# Patient Record
Sex: MALE | Race: BLACK OR AFRICAN AMERICAN | Employment: OTHER | URBAN - METROPOLITAN AREA
[De-identification: names, ages, dates, MRNs, and addresses within clinical notes are randomized per-mention and may not be internally consistent; named-entity substitution may affect disease eponyms.]

---

## 2017-04-07 ENCOUNTER — HOSPITAL ENCOUNTER (INPATIENT)
Age: 82
LOS: 4 days | Discharge: NURSING, REHAB OR LONG-TERM CARE | DRG: 293 | End: 2017-04-11
Attending: EMERGENCY MEDICINE | Admitting: FAMILY MEDICINE
Payer: MEDICARE

## 2017-04-07 ENCOUNTER — APPOINTMENT (OUTPATIENT)
Dept: GENERAL RADIOLOGY | Age: 82
DRG: 293 | End: 2017-04-07
Attending: EMERGENCY MEDICINE
Payer: MEDICARE

## 2017-04-07 DIAGNOSIS — I50.9 CONGESTIVE HEART FAILURE, UNSPECIFIED CONGESTIVE HEART FAILURE CHRONICITY, UNSPECIFIED CONGESTIVE HEART FAILURE TYPE: Primary | ICD-10-CM

## 2017-04-07 DIAGNOSIS — R60.9 PERIPHERAL EDEMA: ICD-10-CM

## 2017-04-07 DIAGNOSIS — R77.8 ELEVATED TROPONIN I LEVEL: ICD-10-CM

## 2017-04-07 LAB
ANION GAP BLD CALC-SCNC: 8 MMOL/L (ref 5–15)
BASOPHILS # BLD AUTO: 0 K/UL (ref 0–0.1)
BASOPHILS # BLD: 1 % (ref 0–1)
BNP SERPL-MCNC: 2968 PG/ML (ref 0–450)
BUN SERPL-MCNC: 25 MG/DL (ref 6–20)
BUN/CREAT SERPL: 20 (ref 12–20)
CALCIUM SERPL-MCNC: 8.7 MG/DL (ref 8.5–10.1)
CHLORIDE SERPL-SCNC: 107 MMOL/L (ref 97–108)
CK SERPL-CCNC: 296 U/L (ref 39–308)
CO2 SERPL-SCNC: 29 MMOL/L (ref 21–32)
CREAT SERPL-MCNC: 1.25 MG/DL (ref 0.7–1.3)
EOSINOPHIL # BLD: 0.1 K/UL (ref 0–0.4)
EOSINOPHIL NFR BLD: 3 % (ref 0–7)
ERYTHROCYTE [DISTWIDTH] IN BLOOD BY AUTOMATED COUNT: 14.6 % (ref 11.5–14.5)
GLUCOSE SERPL-MCNC: 89 MG/DL (ref 65–100)
HCT VFR BLD AUTO: 36.8 % (ref 36.6–50.3)
HGB BLD-MCNC: 11.4 G/DL (ref 12.1–17)
LYMPHOCYTES # BLD AUTO: 39 % (ref 12–49)
LYMPHOCYTES # BLD: 1.3 K/UL (ref 0.8–3.5)
MAGNESIUM SERPL-MCNC: 1.9 MG/DL (ref 1.6–2.4)
MCH RBC QN AUTO: 29.2 PG (ref 26–34)
MCHC RBC AUTO-ENTMCNC: 31 G/DL (ref 30–36.5)
MCV RBC AUTO: 94.1 FL (ref 80–99)
MONOCYTES # BLD: 0.4 K/UL (ref 0–1)
MONOCYTES NFR BLD AUTO: 12 % (ref 5–13)
NEUTS SEG # BLD: 1.5 K/UL (ref 1.8–8)
NEUTS SEG NFR BLD AUTO: 45 % (ref 32–75)
PLATELET # BLD AUTO: 171 K/UL (ref 150–400)
POTASSIUM SERPL-SCNC: 4.1 MMOL/L (ref 3.5–5.1)
RBC # BLD AUTO: 3.91 M/UL (ref 4.1–5.7)
SODIUM SERPL-SCNC: 144 MMOL/L (ref 136–145)
TROPONIN I SERPL-MCNC: 0.12 NG/ML
WBC # BLD AUTO: 3.3 K/UL (ref 4.1–11.1)

## 2017-04-07 PROCEDURE — 36415 COLL VENOUS BLD VENIPUNCTURE: CPT | Performed by: EMERGENCY MEDICINE

## 2017-04-07 PROCEDURE — 83880 ASSAY OF NATRIURETIC PEPTIDE: CPT | Performed by: EMERGENCY MEDICINE

## 2017-04-07 PROCEDURE — 65660000000 HC RM CCU STEPDOWN

## 2017-04-07 PROCEDURE — 74011250637 HC RX REV CODE- 250/637: Performed by: EMERGENCY MEDICINE

## 2017-04-07 PROCEDURE — 96374 THER/PROPH/DIAG INJ IV PUSH: CPT

## 2017-04-07 PROCEDURE — 80048 BASIC METABOLIC PNL TOTAL CA: CPT | Performed by: EMERGENCY MEDICINE

## 2017-04-07 PROCEDURE — 71020 XR CHEST PA LAT: CPT

## 2017-04-07 PROCEDURE — 82550 ASSAY OF CK (CPK): CPT | Performed by: EMERGENCY MEDICINE

## 2017-04-07 PROCEDURE — 94762 N-INVAS EAR/PLS OXIMTRY CONT: CPT

## 2017-04-07 PROCEDURE — 84484 ASSAY OF TROPONIN QUANT: CPT | Performed by: EMERGENCY MEDICINE

## 2017-04-07 PROCEDURE — 85025 COMPLETE CBC W/AUTO DIFF WBC: CPT | Performed by: EMERGENCY MEDICINE

## 2017-04-07 PROCEDURE — 99285 EMERGENCY DEPT VISIT HI MDM: CPT

## 2017-04-07 PROCEDURE — 93005 ELECTROCARDIOGRAM TRACING: CPT

## 2017-04-07 PROCEDURE — 74011250636 HC RX REV CODE- 250/636: Performed by: EMERGENCY MEDICINE

## 2017-04-07 PROCEDURE — 83735 ASSAY OF MAGNESIUM: CPT | Performed by: EMERGENCY MEDICINE

## 2017-04-07 RX ORDER — FUROSEMIDE 10 MG/ML
40 INJECTION INTRAMUSCULAR; INTRAVENOUS
Status: COMPLETED | OUTPATIENT
Start: 2017-04-07 | End: 2017-04-07

## 2017-04-07 RX ORDER — THERA TABS 400 MCG
1 TAB ORAL DAILY
COMMUNITY

## 2017-04-07 RX ORDER — FUROSEMIDE 40 MG/1
40 TABLET ORAL 2 TIMES DAILY
Status: DISCONTINUED | OUTPATIENT
Start: 2017-04-08 | End: 2017-04-11

## 2017-04-07 RX ORDER — SERTRALINE HYDROCHLORIDE 50 MG/1
50 TABLET, FILM COATED ORAL
Status: DISCONTINUED | OUTPATIENT
Start: 2017-04-08 | End: 2017-04-11 | Stop reason: HOSPADM

## 2017-04-07 RX ORDER — CARBIDOPA AND LEVODOPA 25; 250 MG/1; MG/1
1 TABLET ORAL 3 TIMES DAILY
Status: DISCONTINUED | OUTPATIENT
Start: 2017-04-08 | End: 2017-04-11 | Stop reason: HOSPADM

## 2017-04-07 RX ORDER — HYDRALAZINE HYDROCHLORIDE 25 MG/1
25 TABLET, FILM COATED ORAL 3 TIMES DAILY
Status: DISCONTINUED | OUTPATIENT
Start: 2017-04-08 | End: 2017-04-08

## 2017-04-07 RX ORDER — SODIUM CHLORIDE 0.9 % (FLUSH) 0.9 %
5-10 SYRINGE (ML) INJECTION EVERY 8 HOURS
Status: DISCONTINUED | OUTPATIENT
Start: 2017-04-07 | End: 2017-04-11 | Stop reason: HOSPADM

## 2017-04-07 RX ORDER — POTASSIUM CHLORIDE 750 MG/1
10 TABLET, FILM COATED, EXTENDED RELEASE ORAL 2 TIMES DAILY
COMMUNITY
End: 2017-05-02 | Stop reason: SDUPTHER

## 2017-04-07 RX ORDER — SIMVASTATIN 20 MG/1
40 TABLET, FILM COATED ORAL
Status: DISCONTINUED | OUTPATIENT
Start: 2017-04-08 | End: 2017-04-11 | Stop reason: HOSPADM

## 2017-04-07 RX ORDER — FUROSEMIDE 40 MG/1
40 TABLET ORAL DAILY
Status: DISCONTINUED | OUTPATIENT
Start: 2017-04-08 | End: 2017-04-07

## 2017-04-07 RX ORDER — SERTRALINE HYDROCHLORIDE 50 MG/1
50 TABLET, FILM COATED ORAL
COMMUNITY
End: 2017-06-11 | Stop reason: SDUPTHER

## 2017-04-07 RX ORDER — SODIUM CHLORIDE 0.9 % (FLUSH) 0.9 %
5-10 SYRINGE (ML) INJECTION AS NEEDED
Status: DISCONTINUED | OUTPATIENT
Start: 2017-04-07 | End: 2017-04-11 | Stop reason: HOSPADM

## 2017-04-07 RX ORDER — GUAIFENESIN 100 MG/5ML
162 LIQUID (ML) ORAL
Status: COMPLETED | OUTPATIENT
Start: 2017-04-07 | End: 2017-04-07

## 2017-04-07 RX ADMIN — ASPIRIN 81 MG CHEWABLE TABLET 162 MG: 81 TABLET CHEWABLE at 20:47

## 2017-04-07 RX ADMIN — FUROSEMIDE 40 MG: 10 INJECTION, SOLUTION INTRAMUSCULAR; INTRAVENOUS at 20:47

## 2017-04-07 NOTE — IP AVS SNAPSHOT
Current Discharge Medication List  
  
START taking these medications Dose & Instructions Dispensing Information Comments Morning Noon Evening Bedtime  
 losartan 25 mg tablet Commonly known as:  COZAAR Your last dose was: Your next dose is:    
   
   
 Dose:  25 mg Take 1 Tab by mouth daily (with dinner). Quantity:  30 Tab Refills:  0 CONTINUE these medications which have NOT CHANGED Dose & Instructions Dispensing Information Comments Morning Noon Evening Bedtime  
 apixaban 2.5 mg tablet Commonly known as:  Aranza Dage Your last dose was: Your next dose is: TAKE ONE TABLET BY MOUTH TWICE DAILY Quantity:  60 Tab Refills:  12  
     
   
   
   
  
 carbidopa-levodopa  mg per tablet Commonly known as:  SINEMET Your last dose was: Your next dose is:    
   
   
 Dose:  1 Tab Take 1 Tab by mouth three (3) times daily. Quantity:  90 Tab Refills:  2  
     
   
   
   
  
 furosemide 40 mg tablet Commonly known as:  LASIX Your last dose was: Your next dose is:    
   
   
 Dose:  40 mg Take 1 Tab by mouth daily. Quantity:  30 Tab Refills:  12  
     
   
   
   
  
 KLOR-CON 10 10 mEq tablet Generic drug:  potassium chloride SR Your last dose was: Your next dose is:    
   
   
 Dose:  10 mEq Take 10 mEq by mouth two (2) times a day. Refills:  0  
     
   
   
   
  
 labetalol 300 mg tablet Commonly known as:  Lena Glow Your last dose was: Your next dose is:    
   
   
 Dose:  300 mg Take 1 Tab by mouth two (2) times a day. Quantity:  60 Tab Refills:  12  
     
   
   
   
  
 simvastatin 40 mg tablet Commonly known as:  ZOCOR Your last dose was: Your next dose is:    
   
   
 Dose:  40 mg Take 1 Tab by mouth nightly. For cholesterol Quantity:  90 Tab Refills:  3 therapeutic multivitamin tablet Commonly known as:  [facility] Your last dose was: Your next dose is:    
   
   
 Dose:  1 Tab Take 1 Tab by mouth daily. Refills:  0  
     
   
   
   
  
 ZOLOFT 50 mg tablet Generic drug:  sertraline Your last dose was: Your next dose is:    
   
   
 Dose:  50 mg Take 50 mg by mouth nightly. Refills:  0 Where to Get Your Medications Information on where to get these meds will be given to you by the nurse or doctor. ! Ask your nurse or doctor about these medications  
  losartan 25 mg tablet

## 2017-04-07 NOTE — ED PROVIDER NOTES
HPI Comments: 80 y.o. male with past medical history significant for HTN, hypercholesteremia, parkinson disease, DM type II, DVT, and orthostatic hypotension who presents from home with chief complaint of foot swelling. Pt complains of b/l foot swelling increasing within the past week, accompanied by coughing worse at night. Pt reports compliance with Lasix, 40 mg tab 1 x daily. Per daughter, pt was taking 2 diuretics, but in January 2017 PCP discontinued one of them d/t increased urination. Pt reports no decrease in swelling with elevation. Pt denies additional salt or recent change in diet. Pt denies SOB. There are no other acute medical concerns at this time. Social hx: Denies EtOH and tobacco.     PCP: Jimmy Raphael MD    Note written by Annie. Emerita Sweeney, as dictated by Marlen Leong MD 6:29 PM      The history is provided by the patient. No  was used. Past Medical History:   Diagnosis Date    Arthritis 5/5/2010    Diabetes mellitus type 2, diet-controlled ([address]     DVT (deep venous thrombosis) (HCC)     right LE DVT (mostly distal and non-occlusive) discovered 6/15    HTN (hypertension) 5/5/2010    hypertensive heart disease ([facility])    Hypercholesteremia 5/5/2010     in 4/09    Orthostatic hypotension     Complaints of dizziness.  (lying) to 120 (standing) in 3/18/11.  Parkinson disease ([address]     diagnosed around middle of may 2015       No past surgical history on file. Family History:   Problem Relation Age of Onset    Heart Disease Mother     Diabetes Mother     Cancer Mother     Heart Disease Father     Asthma Father        Social History     Social History    Marital status:      Spouse name: N/A    Number of children: N/A    Years of education: N/A     Occupational History    Not on file.      Social History Main Topics    Smoking status: Former Smoker     Packs/day: 0.30     Years: 5.00     Types: Cigarettes, Pipe    Smokeless tobacco: Former User     Types: Snuff    Alcohol use 0.0 oz/week     0 Standard drinks or equivalent per week      Comment: very rarely    Drug use: No    Sexual activity: Not Currently     Other Topics Concern    Not on file     Social History Narrative    Lives with daughter, Jennifer Mays: Review of patient's allergies indicates no known allergies. Review of Systems   Constitutional: Negative for activity change and fever. Eyes: Negative for pain. Respiratory: Positive for cough. Negative for shortness of breath. Cardiovascular: Positive for leg swelling (feet b/l). Negative for chest pain. Gastrointestinal: Negative for abdominal pain. Genitourinary: Negative for flank pain and hematuria. Musculoskeletal: Negative for gait problem, neck pain and neck stiffness. Skin: Negative for color change. Neurological: Negative for speech difficulty and headaches. Hematological: Does not bruise/bleed easily. Psychiatric/Behavioral: Negative for confusion. All other systems reviewed and are negative. Vitals:    04/07/17 1823   BP: 172/86   Pulse: 81   Resp: 18   Temp: 97.3 °F (36.3 °C)   SpO2: 100%   Weight: 81.6 kg (180 lb)   Height: 5' 5\" (1.651 m)            Physical Exam   Constitutional: He is oriented to person, place, and time. He appears well-developed and well-nourished. No distress. HENT:   Head: Normocephalic and atraumatic. Right Ear: External ear normal.   Left Ear: External ear normal.   Eyes: EOM are normal. Pupils are equal, round, and reactive to light. Neck: Normal range of motion. Neck supple. No JVD present. No tracheal deviation present. Cardiovascular: Normal rate, regular rhythm and normal heart sounds. Exam reveals no gallop and no friction rub. No murmur heard. Good pulses in all 4 extremities. Pulmonary/Chest: Effort normal and breath sounds normal. No stridor. No respiratory distress. He has no wheezes. He has no rales. Abdominal: Soft. Bowel sounds are normal. He exhibits no distension. There is no tenderness. There is no rebound and no guarding. Musculoskeletal: Normal range of motion. He exhibits edema (1+ pitting, feet and ankles bilaterally. ). He exhibits no tenderness. Neurological: He is alert and oriented to person, place, and time. He has normal reflexes. No cranial nerve deficit. Coordination normal.   Skin: Skin is warm and dry. No rash noted. He is not diaphoretic. No erythema. Psychiatric: He has a normal mood and affect. His behavior is normal. Judgment and thought content normal.   Nursing note and vitals reviewed. Note written by Annie. Abi Yuen, as dictated by Radha Dodson MD 7:07 PM        MDM  Number of Diagnoses or Management Options  Diagnosis management comments: 71-year-old  male presents to the emergency department with foot swelling. Patient has a history of chronic foot swelling. Patient has a history of CHF. Patient is on Lasix. Patient takes 40 mg once a day of Lasix. He reports about a week of foot swelling bilaterally. Patient has clear lungs. We'll check chest x-ray. Will check basic blood work. We'll check EKG. Differential diagnosis includes peripheral edema, CHF exacerbation, renal insufficiency. We'll reassess when testing is back.        Amount and/or Complexity of Data Reviewed  Clinical lab tests: ordered and reviewed  Tests in the radiology section of CPT®: ordered and reviewed  Tests in the medicine section of CPT®: ordered and reviewed  Decide to obtain previous medical records or to obtain history from someone other than the patient: yes  Obtain history from someone other than the patient: yes  Review and summarize past medical records: yes  Independent visualization of images, tracings, or specimens: yes    Risk of Complications, Morbidity, and/or Mortality  Presenting problems: high  Diagnostic procedures: high  Management options: high      ED Course       Procedures  CXR- No acute process    ED EKG interpretation:  Rhythm: normal sinus rhythm;  Rate (approx.): 79; Axis: normal; prolonged QTC. No ST elevations or depressions. Note written by Annie. Leland Fernandez, as dictated by Bertin Mcneal MD 8:00PM      PROGRESS NOTE:  8:25 PM  Troponin is slightly elevated at 0.12, BNP elevated above baseline. Will give ASA. Pt is CP free. Will consult Family Medicine for admission d/t CHF and (+) troponin. Will give lasix IV. CONSULT  9 PM  Admission to [facility] resident. They agree to admit.     Pt and wife agree to admission plan

## 2017-04-07 NOTE — ED NOTES
Bedside shift change report given to J Luis Sharma RN (oncoming nurse) by Cong Hawley RN (offgoing nurse). Report given with SBAR, MAR, Recent Results, Vital Signs, and plan of care. Pt is alert and oriented  . Call bell within reach of patient. Safety/fall precautions in place.

## 2017-04-07 NOTE — Clinical Note
Status[de-identified] Inpatient [101] Type of Bed: Telemetry [19] Inpatient Hospitalization Certified Necessary for the Following Reasons: 3. Patient receiving treatment that can only be provided in an inpatient setting (further clarification in H&P documentation) Admitting Diagnosis: [facility] exacerbation ([facility] [[id number]] Admitting Physician: Teresa Cronin [[id number]] Attending Physician: Teresa Cronin [[id number]] Estimated Length of Stay: > or = to 2 Midnights Discharge Plan[de-identified] Home with Office Follow-up

## 2017-04-07 NOTE — ED NOTES
Patient report taken, family at bedside, patient resting quietly in well lit room denies needs or concerns at present

## 2017-04-07 NOTE — ED TRIAGE NOTES
Patient reports bilateral foot swelling since the beginning of last week. History of the same. Denies SOB or CP.

## 2017-04-07 NOTE — IP AVS SNAPSHOT
Nilsa Dodson 
 
 
 [address] 
[phone] Patient: Jimmy Carlisle MRN: [id number] :1935 You are allergic to the following No active allergies Recent Documentation Height Weight BMI Smoking Status 1.651 m 80.3 kg 29.45 kg/m2 Former Smoker Emergency Contacts Name Discharge Info Relation Home Work Mobile Silvana Franks DISCHARGE CAREGIVER [3] Daughter [21] [phone] Garry Everett  Child [2] [phone] About your hospitalization You were admitted on:  2017 You last received care in the:  [facility]  MED SURG 2 You were discharged on:  2017 Unit phone number:  [phone] Why you were hospitalized Your primary diagnosis was:  Acute On Chronic Diastolic Chf (Congestive Heart Failure) (Hcc) Your diagnoses also included:  Chf Exacerbation (Hcc), Orthostatic Hypotension, Htn (Hypertension), Lipid Disorder, Parkinsons Disease (Hcc), Diastolic Dysfunction With Chronic Heart Failure (Hcc), Accelerated Hypertension, History Of Dvt (Deep Vein Thrombosis) Providers Seen During Your Hospitalizations Provider Role Specialty Primary office phone Blanche Goyal MD Attending Provider Emergency Medicine [phone] Dirk Kumar MD Attending Provider Family Practice [phone] Chuck Encarnacion MD Attending Provider [facility] [phone] Your Primary Care Physician (PCP) Primary Care Physician Office Phone Office Fax Tamica Louie [phone] Follow-up Information Follow up With Details Comments Contact Info Alejandra Hernandez NP On 2017 Friday, May 12th at 09:30 AM [address] [facility] [address] 1007 [facility] 
[phone] Devante Ruff MD  Following discharge from rehab. Samir Resendizsus [phone] [facility] 99 [phone] [address] [place] [address] Azael [address] 
[phone] Your Appointments Friday May 12, 2017  9:30 AM EDT  
ESTABLISHED PATIENT with Radha Seymour NP  
[facility] ([address]) [address] [place] [address] [address] [place] [address]  
[phone] Current Discharge Medication List  
  
START taking these medications Dose & Instructions Dispensing Information Comments Morning Noon Evening Bedtime  
 losartan 25 mg tablet Commonly known as:  COZAAR Your last dose was: Your next dose is:    
   
   
 Dose:  25 mg Take 1 Tab by mouth daily (with dinner). Quantity:  30 Tab Refills:  0 CONTINUE these medications which have NOT CHANGED Dose & Instructions Dispensing Information Comments Morning Noon Evening Bedtime  
 apixaban 2.5 mg tablet Commonly known as:  Analilia Lay Your last dose was: Your next dose is: TAKE ONE TABLET BY MOUTH TWICE DAILY Quantity:  60 Tab Refills:  12  
     
   
   
   
  
 carbidopa-levodopa  mg per tablet Commonly known as:  SINEMET Your last dose was: Your next dose is:    
   
   
 Dose:  1 Tab Take 1 Tab by mouth three (3) times daily. Quantity:  90 Tab Refills:  2  
     
   
   
   
  
 furosemide 40 mg tablet Commonly known as:  LASIX Your last dose was: Your next dose is:    
   
   
 Dose:  40 mg Take 1 Tab by mouth daily. Quantity:  30 Tab Refills:  12  
     
   
   
   
  
 KLOR-CON 10 10 mEq tablet Generic drug:  potassium chloride SR Your last dose was: Your next dose is:    
   
   
 Dose:  10 mEq Take 10 mEq by mouth two (2) times a day. Refills:  0  
     
   
   
   
  
 labetalol 300 mg tablet Commonly known as:  Mukesh Hernandez Your last dose was:     
   
Your next dose is: Dose:  300 mg Take 1 Tab by mouth two (2) times a day. Quantity:  60 Tab Refills:  12  
     
   
   
   
  
 simvastatin 40 mg tablet Commonly known as:  ZOCOR Your last dose was: Your next dose is:    
   
   
 Dose:  40 mg Take 1 Tab by mouth nightly. For cholesterol Quantity:  90 Tab Refills:  3 therapeutic multivitamin tablet Commonly known as:  [facility] Your last dose was: Your next dose is:    
   
   
 Dose:  1 Tab Take 1 Tab by mouth daily. Refills:  0  
     
   
   
   
  
 ZOLOFT 50 mg tablet Generic drug:  sertraline Your last dose was: Your next dose is:    
   
   
 Dose:  50 mg Take 50 mg by mouth nightly. Refills:  0 Where to Get Your Medications Information on where to get these meds will be given to you by the nurse or doctor. ! Ask your nurse or doctor about these medications  
  losartan 25 mg tablet Discharge Instructions REHAB DISCHARGE INSTRUCTIONS Kelsea Barraza / [id number] : 1935 Admission date: 2017 Discharge date: 2017 Primary care provider: Oscar Stuart MD 
 
Discharging provider:  Jade Rincon MD  - Family Medicine Resident Ave Chavira MD - Attending, Family Medicine [place] . . . . . . . . . . . . . . . . . . . . . . . . . . . . . . . . . . . . . . . . . . . . . . . . . . . . . . . . . . . . . . . . . . . . . . [place] FINAL DIAGNOSES & HOSPITAL COURSE: 
 
Exacerbation of Hypertensive Heart Disease + Diastolic CHF (HFpEF) - Echo at this episode EF 55-60% with Grade 2 diastolic dysfunction (unchanged from 2015). BNP was 2968 on admission. Patient is non-compliant with medication and with follow up. Was treated with BID lasix therapy. Weights poorly charted during hospitalization. Unsure if patient back to readmission weight.   Symptoms are under much better control--no longer with cough, sob.  LE swelling much improved. - 40mg PO lasix daily. 
- Daily weights, patient's average weight is 166 lbs. - Cardiac diet with low sodium and potassium. 
- Follow up with cardiologist (Dr. Crystal Diaz) following discharge from rehab. 
  
Hx of DVT 
- Continue home Eliquis (prophylactically given to prevent the recurrence of a DVT).    
Hypertension; BP remained elevated during hospitalization. Cardiology discontinued hydralazine, added cozaar. Patient was supposed to be taking cozaar in the past, but stopped for some reason. - Continue labetalol, cozaar. 
- Discontinue hydralazine. 
   
Hyperlipidemia; Lipid Panel 12/14/16 Tchol: 226 LDL: 129 HDL: 103 
- Continue Zocor qHS.    
Parkinson's Disease - Continue home Sinemet. 
  
Left Foot Eschar - patient's family at bedside and concerned about ulcer on heel on day of discharge. Would like wound care to follow and treat. Appears area of keratosis, slightly painful. Appears intact. I recommended moisturizing lotion and good fitting socks. - Consulted wound care to see if any additional recommendations are appropriate at this time. - Patient can be followed by wound care nurse at rehab facility. 
  
Depression - stable. - Zoloft 
   
Constipation 
- Held OTC Ex-lax, can use this at rehab if needed. FOLLOW-UP CARE RECOMMENDATIONS: 
Follow-up Information Follow up With Details Comments Contact Info Raghav Wallace MD In 1 month Cardiologist follow up. [facility] [address] 
[phone] Mat Carmona MD  Following discharge from rehab. Samir Naidu Jesus [phone] [address] 
[phone] It is very important that you keep follow-up appointment(s). Bring discharge papers, medication list (and/or medication bottles) to follow-up appointments for review by outpatient provider(s). FOLLOW-UP TESTS RECOMMENDED:  
· Per cardiology.  
 
ONGOING TREATMENT PLAN: See hospital course above. 
 
PENDING TEST RESULTS: 
At the time of discharge the following test results are still pending: None. Please review these results as they become available. Specific symptoms to watch for: chest pain, shortness of breath, fever, chills, nausea, vomiting, diarrhea, change in mentation, falling, weakness, bleeding. DIET:  Cardiac Diet (low sodium) ACTIVITY:  PT/OT Eval and Treat WOUND CARE: Wound care eval and treat (left heel wound/ulcer). GOALS OF CARE: 
x  Eventual return to home/independent/assisted living Long term SNF Hospice No rehospitalization Patient condition at discharge:  
Functional status Poor   
x  Deconditioned Independent Cognition 
x  Lucid Forgetful (some sensescence) Dementia Catheters/lines (plus indication) Sanders PICC   
  PEG   
x  None Code status Full code   
x  DNR Gevena Kieran . . . . . . . . . . . . . . . . . . . . . . . . . . . . . . . . . . . . . . . . . . . . . . . . . . . . . . . . . . . . . . . . . . . . . . Gevena Kieran CHRONIC MEDICAL CONDITIONS: 
Problem List as of 4/11/2017  Date Reviewed: 4/8/2017 Codes Class Noted - Resolved History of DVT (deep vein thrombosis) ICD-10-CM: I99.741 ICD-9-CM: V12.51  4/11/2017 - Present * (Principal)Acute on chronic diastolic CHF (congestive heart failure) ([facility]) ICD-10-CM: I50.33 ICD-9-CM: 428.33, 428.0  4/7/2017 - Present Diastolic dysfunction with chronic heart failure ([facility] [address] ICD-10-CM: I50.32 
ICD-9-CM: 428.32  12/21/2015 - Present Mild anemia ICD-10-CM: D64.9 ICD-9-CM: 285.9  12/20/2015 - Present Accelerated hypertension ICD-10-CM: I10 
ICD-9-CM: 401.0  12/18/2015 - Present Situational anxiety ICD-10-CM: F41.8 ICD-9-CM: 300.09  6/17/2015 - Present BPH (benign prostatic hypertrophy) ICD-10-CM: N40.0 ICD-9-CM: 600.00  6/17/2015 - Present [facility]insons disease ([facility] ICD-10-CM: G20 
ICD-9-CM: 332.0  5/26/2015 - Present Asthma, mild intermittent ICD-10-CM: J45.20 ICD-9-CM: 493.90  3/17/2015 - Present Chronic lumbar pain ICD-10-CM: M54.5, G89.29 ICD-9-CM: 724.2, 338.29  3/11/2015 - Present DJD (degenerative joint disease), lumbar ICD-10-CM: M47.816 ICD-9-CM: 721.3  2/6/2015 - Present Diabetes mellitus type II, controlled ([facility] ICD-10-CM: E11.9 ICD-9-CM: 250.00  1/26/2015 - Present Lipid disorder ICD-10-CM: E78.9 ICD-9-CM: 272.9  8/16/2012 - Present Overview Signed 8/16/2012  1:36 PM by Mayte Monge MD  
  LDL goal < 70 Abnormal EKG ICD-10-CM: R94.31 
ICD-9-CM: 794.31  5/12/2011 - Present Overview Signed 7/6/2011  5:47 PM by Keira King MD  
  Echo 2d adult 5/19/11  
mild-mod LVH, EF 50-55% (low normal). Mod LAE, mild MR.  
  
Nm cardiac spect w stress / rest mult 5/19/11  
no inducible ischemia; LVEF 45% Cardiac dysrhythmia, unspecified ICD-10-CM: I49.9 ICD-9-CM: 427.9  5/12/2011 - Present Orthostatic hypotension (Chronic) ICD-10-CM: I95.1 ICD-9-CM: 458.0  Unknown - Present Overview Signed 5/12/2011  2:55 PM by Allegra Martinez MD  
  Complaints of dizziness.  (lying) to 120 (standing) in 3/18/11. HTN (hypertension) ICD-10-CM: I10 
ICD-9-CM: 401.9  3/19/2011 - Present Overview Signed 5/23/2011  8:08 AM by Keira King MD  
  ECHO 5/2011 = LVH, EF 50% Hypotension, postural ICD-10-CM: I95.1 ICD-9-CM: 458.0  3/19/2011 - Present Overview Signed 7/6/2011  5:47 PM by Keira King MD  
  Echo 2d adult 5/19/11  
mild-mod LVH, EF 50-55% (low normal). Mod LAE, mild MR.  
  
Nm cardiac spect w stress / rest mult 5/19/11  
no inducible ischemia; LVEF 45% ED (erectile dysfunction) ICD-10-CM: N52.9 ICD-9-CM: 607.84  3/19/2011 - Present RAD (reactive airway disease) ICD-10-CM: J45.909 ICD-9-CM: 493.90  3/19/2011 - Present H/O: GI bleed ICD-10-CM: Z87.19 ICD-9-CM: V12.79  5/5/2010 - Present  
   
 TIA (transient ischemic attack) ICD-10-CM: G45.9 ICD-9-CM: 435.9  5/5/2010 - Present RESOLVED: CHF exacerbation ([address] ICD-10-CM: I50.9 ICD-9-CM: 428.0  4/7/2017 - 4/8/2017 RESOLVED: Protein in urine ICD-10-CM: R80.9 ICD-9-CM: 791.0  5/28/2015 - 1/11/2016 RESOLVED: Hyperglycemia ICD-10-CM: R73.9 ICD-9-CM: 790.29  9/6/2012 - 3/11/2015 Overview Addendum 7/17/2014  5:13 PM by Belen Weathers MD  
  a1c 6.8 7/2014 Need to repeat to confirm T2DM RESOLVED: Weight loss, unintentional ICD-10-CM: R63.4 ICD-9-CM: 783.21  7/6/2011 - 3/11/2015 Overview Signed 7/6/2011  5:45 PM by Ismael Joseph MD  
  Saw nutritionist 6/2011 at [facility] RESOLVED: Grief ICD-10-CM: F43.20 ICD-9-CM: 309.0  6/3/2011 - 3/11/2015 Overview Signed 6/3/2011 11:06 AM by Ismael Joseph MD  
  Lost wife in 2011 RESOLVED: Prostatism ICD-10-CM: N40.0 ICD-9-CM: 600.90  6/3/2011 - 12/2/2015 RESOLVED: Arthritis ICD-10-CM: M19.90 ICD-9-CM: 716.90  3/19/2011 - 12/2/2015 Information obtained by :  
I understand that if any problems occur once I am at home I am to contact my physician. I understand and acknowledge receipt of the instructions indicated above. Physician's or R.N.'s Signature                                                                  Date/Time Patient or Representative Signature                                                          Date/Time Avoiding Triggers with Congestive Heart Failure (CHF):  
Your Care Instructions Triggers are anything that make your heart failure flare up.  A flare-up is also called \"sudden heart failure\" or \"acute heart failure. \" When you have a flare-up, fluid builds up in your lungs, and you have problems breathing. You might need to go to the hospital. By watching for changes in your condition and avoiding triggers, you can prevent heart failure flare-ups. Follow-up care is a key part of your treatment and safety. Be sure to make and go to all appointments, and call your doctor if you are having problems. It's also a good idea to know your test results and keep a list of the medicines you take. How can you care for yourself at home? Watch for changes in your weight and condition · Weigh yourself without clothing at the same time each day. Record your weight. Call your doctor if you gain 3 pounds or more in 24 hrs or 5 pounds in one week. A sudden weight gain may mean that your heart failure is getting worse. · Keep a daily record of your symptoms. Write down any changes in how you feel, such as new shortness of breath, cough, or problems eating. Also record if your ankles are more swollen than usual and if you have to urinate in the night more often. Note anything that you ate or did that could have triggered these changes. Limit sodium Sodium causes your body to hold on to water, making it harder for your heart to pump. People get most of their sodium from processed foods. Fast food and restaurant meals also tend to be very high in sodium. · Your doctor may suggest that you limit sodium to 1,500 milligrams (mg) a day. That is less than 1 teaspoon of salt a day, including all the salt you eat in cooking or in packaged foods. · Read food labels on cans and food packages. They tell you how much sodium you get in one serving. Check the serving size. If you eat more than one serving, you are getting more sodium. · Be aware that sodium can come in forms other than salt, including monosodium glutamate (MSG), sodium citrate, and sodium bicarbonate (baking soda). MSG is often added to Asian food.  You can sometimes ask for food without MSG or salt. · Slowly reducing salt will help you adjust to the taste. Take the salt shaker off the table. · Flavor your food with garlic, lemon juice, onion, vinegar, herbs, and spices instead of salt. Do not use soy sauce, steak sauce, onion salt, garlic salt, mustard, or ketchup on your food, unless it is labeled \"low-sodium\" or \"low-salt. \" 
· Make your own salad dressings, sauces, and ketchup without adding salt. · Use fresh or frozen ingredients, instead of canned ones, whenever you can. Choose low-sodium canned goods. · Eat less processed food and food from restaurants, including fast food. Exercise as directed Moderate, regular exercise is very good for your heart. It improves your blood flow and helps control your weight. But too much exercise can stress your heart and cause a heart failure flare-up. · Check with your doctor before you start an exercise program. 
· Walking is an easy way to get exercise. Start out slowly. Gradually increase the length and pace of your walk. Swimming, riding a bike, and using a treadmill are also good forms of exercise. · When you exercise, watch for signs that your heart is working too hard. You are pushing yourself too hard if you cannot talk while you are exercising. If you become short of breath or dizzy or have chest pain, stop, sit down, and rest. 
· Do not exercise when you do not feel well. Take medicines correctly · Take your medicines exactly as prescribed. Call your doctor if you think you are having a problem with your medicine. · Make a list of all the medicines you take. Include those prescribed to you by other doctors and any over-the-counter medicines, vitamins, or supplements you take. Take this list with you when you go to any doctor. · Take your medicines at the same time every day. It may help you to post a list of all the medicines you take every day and what time of day you take them.  
· Make taking your medicine as simple as you can. Plan times to take your medicines when you are doing other things, such as eating a meal or getting ready for bed. This will make it easier to remember to take your medicines. · Get organized. Use helpful tools, such as daily or weekly pill containers. When should you call for help? Call 911 if you have symptoms of sudden heart failure such as: 
· You have severe trouble breathing. · You cough up pink, foamy mucus. · You have a new irregular or rapid heartbeat. Call your doctor now or seek immediate medical care if: 
· You have new or increased shortness of breath. · You are dizzy or lightheaded, or you feel like you may faint. · You have sudden weight gain, such as 3 pounds in 24 hours, or 5 pounds in one week. · You have increased swelling in your legs, ankles, or feet. · You are suddenly so tired or weak that you cannot do your usual activities. Watch closely for changes in your health, and be sure to contact your doctor if you develop new symptoms. Where can you learn more? Go to http://silver-jesus.info/ Enter N299 in the search box to learn more about \"Avoiding Triggers With Heart Failure: Care Instructions. \" 
© 3815-5311 Healthwise, Incorporated. Care instructions adapted under license by [employer] (which disclaims liability or warranty for this information). This care instruction is for use with your licensed healthcare professional. If you have questions about a medical condition or this instruction, always ask your healthcare professional. [place] [address] any warranty or liability for your use of this information. Content Version: 26.4.751241; Current as of: January 27, 2016 (modified 10/10/16). Discharge Orders None ACO Transitions of Care Introducing [employer] [facility] offers a voluntary care coordination program to provide high quality service and care to [facility] fee-for-service beneficiaries. Merlinda Fells was designed to help you enhance your health and well-being through the following services:  Transitions of Care  support for individuals who are transitioning from one care setting to another (example: Hospital to home).  Chronic and Complex Care Coordination  support for individuals and caregivers of those with serious or chronic illnesses or with more than one chronic (ongoing) condition and those who take a number of different medications. If you meet specific medical criteria, a [address] [place] [address] Rd may call you directly to coordinate your care with your primary care physician and your other care providers. For questions about the [facility] programs, please, contact your physicians office. For general questions or additional information about Accountable Care Organizations: 
Please visit www.medicare.gov/acos. html or call 1-800-MEDICARE (5-939.175.4759) [employer] users should call 8-887.659.6167. [employer] Announcement We are excited to announce that we are making your provider's discharge notes available to you in [employer]. You will see these notes when they are completed and signed by the physician that discharged you from your recent hospital stay. If you have any questions or concerns about any information you see in [employer], please call the Health Information Department where you were seen or reach out to your Primary Care Provider for more information about your plan of care. Introducing [facility]! Dear Bautista Mcallister: Thank you for requesting a [employer] account. Our records indicate that you have previously registered for a [employer] account but its currently inactive. Please call our [employer] support line at [phone]. Additional Information If you have questions, please visit the Frequently Asked Questions section of the [employer] website at [email or web address] Remember, MyChart is NOT to be used for urgent needs. For medical emergencies, dial 911. Now available from your iPhone and Android! General Information Please provide this summary of care documentation to your next provider. Patient Signature:  ____________________________________________________________ Date:  ____________________________________________________________  
  
Denisse Brought Provider Signature:  ____________________________________________________________ Date:  ____________________________________________________________

## 2017-04-08 PROBLEM — I50.33 ACUTE ON CHRONIC DIASTOLIC CHF (CONGESTIVE HEART FAILURE) (HCC): Status: ACTIVE | Noted: 2017-04-07

## 2017-04-08 PROBLEM — I50.9 CHF EXACERBATION (HCC): Status: RESOLVED | Noted: 2017-04-07 | Resolved: 2017-04-08

## 2017-04-08 LAB
ALBUMIN SERPL BCP-MCNC: 3.8 G/DL (ref 3.5–5)
ALBUMIN/GLOB SERPL: 1.2 {RATIO} (ref 1.1–2.2)
ALP SERPL-CCNC: 81 U/L (ref 45–117)
ALT SERPL-CCNC: 14 U/L (ref 12–78)
ANION GAP BLD CALC-SCNC: 6 MMOL/L (ref 5–15)
AST SERPL W P-5'-P-CCNC: 22 U/L (ref 15–37)
BILIRUB SERPL-MCNC: 0.6 MG/DL (ref 0.2–1)
BUN SERPL-MCNC: 24 MG/DL (ref 6–20)
BUN/CREAT SERPL: 18 (ref 12–20)
CALCIUM SERPL-MCNC: 8.8 MG/DL (ref 8.5–10.1)
CHLORIDE SERPL-SCNC: 103 MMOL/L (ref 97–108)
CO2 SERPL-SCNC: 34 MMOL/L (ref 21–32)
CREAT SERPL-MCNC: 1.34 MG/DL (ref 0.7–1.3)
ERYTHROCYTE [DISTWIDTH] IN BLOOD BY AUTOMATED COUNT: 14.5 % (ref 11.5–14.5)
GLOBULIN SER CALC-MCNC: 3.3 G/DL (ref 2–4)
GLUCOSE SERPL-MCNC: 155 MG/DL (ref 65–100)
HCT VFR BLD AUTO: 35.5 % (ref 36.6–50.3)
HGB BLD-MCNC: 11.5 G/DL (ref 12.1–17)
MCH RBC QN AUTO: 30 PG (ref 26–34)
MCHC RBC AUTO-ENTMCNC: 32.4 G/DL (ref 30–36.5)
MCV RBC AUTO: 92.7 FL (ref 80–99)
PLATELET # BLD AUTO: 177 K/UL (ref 150–400)
POTASSIUM SERPL-SCNC: 3.6 MMOL/L (ref 3.5–5.1)
PROT SERPL-MCNC: 7.1 G/DL (ref 6.4–8.2)
RBC # BLD AUTO: 3.83 M/UL (ref 4.1–5.7)
SODIUM SERPL-SCNC: 143 MMOL/L (ref 136–145)
TROPONIN I SERPL-MCNC: 0.1 NG/ML
TROPONIN I SERPL-MCNC: 0.11 NG/ML
WBC # BLD AUTO: 3.5 K/UL (ref 4.1–11.1)

## 2017-04-08 PROCEDURE — 80053 COMPREHEN METABOLIC PANEL: CPT | Performed by: FAMILY MEDICINE

## 2017-04-08 PROCEDURE — 97116 GAIT TRAINING THERAPY: CPT

## 2017-04-08 PROCEDURE — 65270000029 HC RM PRIVATE

## 2017-04-08 PROCEDURE — 84484 ASSAY OF TROPONIN QUANT: CPT | Performed by: FAMILY MEDICINE

## 2017-04-08 PROCEDURE — 85027 COMPLETE CBC AUTOMATED: CPT | Performed by: FAMILY MEDICINE

## 2017-04-08 PROCEDURE — 74011250637 HC RX REV CODE- 250/637: Performed by: FAMILY MEDICINE

## 2017-04-08 PROCEDURE — 74011250637 HC RX REV CODE- 250/637: Performed by: HOSPITALIST

## 2017-04-08 PROCEDURE — 93306 TTE W/DOPPLER COMPLETE: CPT

## 2017-04-08 PROCEDURE — 36415 COLL VENOUS BLD VENIPUNCTURE: CPT | Performed by: FAMILY MEDICINE

## 2017-04-08 PROCEDURE — 97162 PT EVAL MOD COMPLEX 30 MIN: CPT

## 2017-04-08 RX ORDER — LOSARTAN POTASSIUM 25 MG/1
25 TABLET ORAL
Status: DISCONTINUED | OUTPATIENT
Start: 2017-04-08 | End: 2017-04-11 | Stop reason: HOSPADM

## 2017-04-08 RX ORDER — POTASSIUM CHLORIDE 1.5 G/1.77G
40 POWDER, FOR SOLUTION ORAL
Status: COMPLETED | OUTPATIENT
Start: 2017-04-08 | End: 2017-04-08

## 2017-04-08 RX ORDER — POTASSIUM CHLORIDE 1.5 G/1.77G
40 POWDER, FOR SOLUTION ORAL EVERY 4 HOURS
Status: COMPLETED | OUTPATIENT
Start: 2017-04-08 | End: 2017-04-08

## 2017-04-08 RX ADMIN — CARBIDOPA AND LEVODOPA 1 TABLET: 25; 250 TABLET ORAL at 08:56

## 2017-04-08 RX ADMIN — CARBIDOPA AND LEVODOPA 1 TABLET: 25; 250 TABLET ORAL at 01:28

## 2017-04-08 RX ADMIN — LABETALOL HCL 300 MG: 200 TABLET, FILM COATED ORAL at 17:27

## 2017-04-08 RX ADMIN — SIMVASTATIN 40 MG: 20 TABLET, FILM COATED ORAL at 20:02

## 2017-04-08 RX ADMIN — SIMVASTATIN 40 MG: 20 TABLET, FILM COATED ORAL at 00:08

## 2017-04-08 RX ADMIN — CARBIDOPA AND LEVODOPA 1 TABLET: 25; 250 TABLET ORAL at 17:27

## 2017-04-08 RX ADMIN — Medication 10 ML: at 14:00

## 2017-04-08 RX ADMIN — HYDRALAZINE HYDROCHLORIDE 25 MG: 25 TABLET, FILM COATED ORAL at 00:08

## 2017-04-08 RX ADMIN — Medication 10 ML: at 04:43

## 2017-04-08 RX ADMIN — FUROSEMIDE 40 MG: 40 TABLET ORAL at 08:57

## 2017-04-08 RX ADMIN — APIXABAN 2.5 MG: 2.5 TABLET, FILM COATED ORAL at 00:08

## 2017-04-08 RX ADMIN — CARBIDOPA AND LEVODOPA 1 TABLET: 25; 250 TABLET ORAL at 20:03

## 2017-04-08 RX ADMIN — APIXABAN 2.5 MG: 2.5 TABLET, FILM COATED ORAL at 17:27

## 2017-04-08 RX ADMIN — FUROSEMIDE 40 MG: 40 TABLET ORAL at 17:27

## 2017-04-08 RX ADMIN — LABETALOL HCL 300 MG: 200 TABLET, FILM COATED ORAL at 08:57

## 2017-04-08 RX ADMIN — POTASSIUM CHLORIDE 40 MEQ: 1.5 POWDER, FOR SOLUTION ORAL at 17:27

## 2017-04-08 RX ADMIN — APIXABAN 2.5 MG: 2.5 TABLET, FILM COATED ORAL at 08:56

## 2017-04-08 RX ADMIN — SERTRALINE 50 MG: 50 TABLET, FILM COATED ORAL at 20:02

## 2017-04-08 RX ADMIN — HYDRALAZINE HYDROCHLORIDE 25 MG: 25 TABLET, FILM COATED ORAL at 08:56

## 2017-04-08 RX ADMIN — LOSARTAN POTASSIUM 25 MG: 25 TABLET, FILM COATED ORAL at 17:27

## 2017-04-08 RX ADMIN — POTASSIUM CHLORIDE 40 MEQ: 1.5 POWDER, FOR SOLUTION ORAL at 13:00

## 2017-04-08 RX ADMIN — SERTRALINE 50 MG: 50 TABLET, FILM COATED ORAL at 00:07

## 2017-04-08 RX ADMIN — Medication 10 ML: at 00:09

## 2017-04-08 RX ADMIN — POTASSIUM CHLORIDE 40 MEQ: 1.5 POWDER, FOR SOLUTION ORAL at 05:07

## 2017-04-08 NOTE — PROGRESS NOTES
Problem: Mobility Impaired (Adult and Pediatric)  Goal: *Acute Goals and Plan of Care (Insert Text)  Physical Therapy Goals  Initiated 4/8/2017  1. Patient will move from supine to sit and sit to supine in bed with modified independence within 7 day(s). 2. Patient will transfer from bed to chair and chair to bed with supervision/set-up using the least restrictive device within 7 day(s). 3. Patient will perform sit to stand with supervision/set-up within 7 day(s). 4. Patient will ambulate with supervision/set-up for 100 feet with the least restrictive device within 7 day(s). 5. Patient will ascend/descend 4 stairs with 2 handrail(s) with minimal assistance/contact guard assist within 7 day(s). PHYSICAL THERAPY EVALUATION  Patient: Josefina Bosch (43 y.o. male)  Date: 4/8/2017  Primary Diagnosis: CHF exacerbation ([address]  Acute exacerbation of CHF (congestive heart failure) ([facility] [address]        Precautions:   Fall      ASSESSMENT :  Based on the objective data described below, the patient presents with decreased endurance, balance, strength and overall functional mobility following admission for increased shortness of breath and cough and found to be in an acute CHF exacerbation. Patient has premorbid conditions of Parkinson's, history of DVT's and HTN. Today patient does present with increased bradykinesia, he requires increased time for processing and verbal responses as well as increased time for completion of activities. He performs better when given extra time. He has increased soluloria and exacerbated when doing a functional activity (drools on floor). He does have increased LE swelling, noted new MD order for wraps for bilateral LE swelling (order placed following the PT evaluation- we will perform management of swelling at next visit). He has +2 pitting edema in both LE with right being slightly greater than the Left. He does have a history of LE DVT but no warmth, redness or pain.   He was overall supervision for bed mobility, MOD A for sit-stand, and MIN A for ambulation using his premorbid assistive device of SPC. Patient with vitals stable throughout, O2 on room air at 95% with activity. Patient was not orthostatic with our session but has a history of orthostasis. He was returned to sitting up in chair with LE elevated due to swelling. He would benefit from skilled PT while in the hospital.  For discharge- patient may benefit from rehab vs outpatient neuro PT (at a [address] [place] [address]). Unable to determine amount of assistance at home, he lives with his daughter but not clear if she is available 24/7. Patient would require 24/7 assistance currently if he is returning home. Patient would also benefit from a referral to a Parkinson's clinic for adequate managment of his current multiple Parkinson's characteristics (orthostatics, LE swelling, and soluloria). Patient will benefit from skilled intervention to address the above impairments. Patients rehabilitation potential is considered to be Good  Factors which may influence rehabilitation potential include:   [X]         None noted  [ ]         Mental ability/status  [ ]         Medical condition  [ ]         Home/family situation and support systems  [ ]         Safety awareness  [ ]         Pain tolerance/management  [ ]         Other:        PLAN :  Recommendations and Planned Interventions:  [X]           Bed Mobility Training             [X]    Neuromuscular Re-Education  [X]           Transfer Training                   [ ]    Orthotic/Prosthetic Training  [X]           Gait Training                         [X]    Modalities  [X]           Therapeutic Exercises           [ ]    Edema Management/Control  [X]           Therapeutic Activities            [X]    Patient and Family Training/Education  [ ]           Other (comment):     Frequency/Duration: Patient will be followed by physical therapy  5 times a week to address goals.   Discharge Recommendations: Rehab vs Parkinson's Specific Outpatient PT- depending on availability of 24/7 assistance at home  Further Equipment Recommendations for Discharge: owns [facility] and        SUBJECTIVE:   Patient stated i am fine.       OBJECTIVE DATA SUMMARY:   HISTORY:    Past Medical History:   Diagnosis Date    Arthritis 5/5/2010    Diabetes mellitus type 2, diet-controlled ([address]      DVT (deep venous thrombosis) ([address]       right LE DVT (mostly distal and non-occlusive) discovered 6/15    HTN (hypertension) 5/5/2010     hypertensive heart disease (LVH)    Hypercholesteremia 5/5/2010      in 4/09    Orthostatic hypotension       Complaints of dizziness.  (lying) to 120 (standing) in 3/18/11.  Parkinson disease ([facility] [address]       diagnosed around middle of may 2015   No past surgical history on file. Prior Level of Function/Home Situation: see above  Personal factors and/or comorbidities impacting plan of care:      Home Situation  Home Environment: Private residence  One/Two Story Residence: Other (Comment) (3 story)  Living Alone: No  Support Systems: Child(hector)  Patient Expects to be Discharged to[de-identified] Private residence  Current DME Used/Available at Home: Jay Sessions, straight     EXAMINATION/PRESENTATION/DECISION MAKING:   Critical Behavior:              Hearing:   Auditory  Auditory Impairment: Hard of hearing, bilateral  Skin:  All exposed intact  Edema: +2 pitting edmea in bilateral LE  Range Of Motion:  AROM: Within functional limits           PROM: Within functional limits           Strength:    Strength: Generally decreased, functional        RLE Strength  R Hip Flexion: 3-  R Knee Flexion: 3  R Knee Extension: 3-  R Ankle Dorsiflexion: 3  R Ankle Plantar Flexion: 3        LLE Strength  L Hip Flexion: 3-  L Knee Flexion: 3  L Knee Extension: 3-  L Ankle Dorsiflexion: 3  L Ankle Plantar Flexion: 3  Tone & Sensation:   Tone: Normal              Sensation: Intact Coordination:  Coordination: Generally decreased, functional  Vision:      Functional Mobility:  Bed Mobility:  Rolling: Supervision; Additional time  Supine to Sit: Supervision; Additional time        Transfers:  Sit to Stand: Moderate assistance; Additional time;Assist x1  Stand to Sit: Moderate assistance; Additional time;Assist x1        Bed to Chair: Minimum assistance; Additional time;Assist x1              Balance:   Sitting: Intact  Standing: Impaired  Standing - Static: Constant support  Standing - Dynamic : Poor  Ambulation/Gait Training:     Assistive Device: Gait belt;Cane, straight  Ambulation - Level of Assistance: Minimal assistance     Gait Description (WDL): Exceptions to WDL  Gait Abnormalities: Decreased step clearance; Steppage gait (bradykinetic )                                                             Stairs: Therapeutic Exercises:         Functional Measure:  Tinetti test:      Sitting Balance: 1  Arises: 0  Attempts to Rise: 0  Immediate Standing Balance: 0  Standing Balance: 0  Nudged: 0  Eyes Closed: 0  Turn 360 Degrees - Continuous/Discontinuous: 0  Turn 360 Degrees - Steady/Unsteady: 0  Sitting Down: 1  Balance Score: 2  Indication of Gait: 0  R Step Length/Height: 0  L Step Length/Height: 0  R Foot Clearance: 1  L Foot Clearance: 1  Step Symmetry: 1  Step Continuity: 0  Path: 1  Trunk: 0  Walking Time: 0  Gait Score: 4  Total Score: 6         Tinetti Test and G-code impairment scale:  Percentage of Impairment CH     0%    CI     1-19% CJ     20-39% CK     40-59% CL     60-79% CM     80-99% CN      100%   Tinetti  Score 0-28 28 23-27 17-22 12-16 6-11 1-5 0          Tinetti Tool Score Risk of Falls  <19 = High Fall Risk  19-24 = Moderate Fall Risk  25-28 = Low Fall Risk  Tinetti ME. Performance-Oriented Assessment of Mobility Problems in Elderly Patients. Dawn 66; [id number].  (Scoring Description: PT Bulletin Feb. 10, 1993)     Older adults: Vinnie Cornejo et al, 2009; n = [address] elderly evaluated with ABC, DOUGLAS, ADL, and IADL)  · Mean DOUGLAS score for males aged 69-68 years = 26.21(3.40)  · Mean DOUGLAS score for females age 69-68 years = 25.16(4.30)  · Mean DOUGLAS score for males over 80 years = 23.29(6.02)  · Mean DOUGLAS score for females over 80 years = 17.20(8.32)         G codes: In compliance with CMSs Claims Based Outcome Reporting, the following G-code set was chosen for this patient based on their primary functional limitation being treated: The outcome measure chosen to determine the severity of the functional limitation was the Tinetti with a score of 6/28 which was correlated with the impairment scale. · Mobility - Walking and Moving Around:               - CURRENT STATUS:    CK - 40%-59% impaired, limited or restricted               - GOAL STATUS:           CJ - 20%-39% impaired, limited or restricted               - D/C STATUS:                       ---------------To be determined---------------      Physical Therapy Evaluation Charge Determination   History Examination Presentation Decision-Making   HIGH Complexity :3+ comorbidities / personal factors will impact the outcome/ POC  HIGH Complexity : 4+ Standardized tests and measures addressing body structure, function, activity limitation and / or participation in recreation  MEDIUM Complexity : Evolving with changing characteristics  Other outcome measures Tinetti  MEDIUM      Based on the above components, the patient evaluation is determined to be of the following complexity level: MEDIUM     Pain:  Pain Scale 1: Numeric (0 - 10)  Pain Intensity 1: 0              Activity Tolerance:   Fair- no medical complications  Please refer to the flowsheet for vital signs taken during this treatment.   After treatment:   [X]         Patient left in no apparent distress sitting up in chair  [ ]         Patient left in no apparent distress in bed  [X]         Call bell left within reach  [X]         Nursing notified  [ ]         Caregiver present  [X]         Chair alarm activated      COMMUNICATION/EDUCATION:   The patients plan of care was discussed with: Registered Nurse.  [X]         Fall prevention education was provided and the patient/caregiver indicated understanding. [X]         Patient/family have participated as able in goal setting and plan of care. [X]         Patient/family agree to work toward stated goals and plan of care. [ ]         Patient understands intent and goals of therapy, but is neutral about his/her participation. [ ]         Patient is unable to participate in goal setting and plan of care.      Thank you for this referral.  Dillan Antonio, PT, DPT   Time Calculation: 20 mins

## 2017-04-08 NOTE — PROGRESS NOTES
[address] [facility] RESIDENCY PROGRAM  PROGRESS NOTE     2017  PCP: Rosario Rayo MD     Assessment/Plan:   Hospital Day: 2    Josefina Bosch is a 80 y.o. male who is admitted for CHF exacerbation.     CHF exacerbation; ECHO on 12/19/15 reveals EF 55%. BNP is 2968. Patient is non compliant with medication and with proper medical follow up. - Admit to telemetry   - Cardiology consulted; follow up recomendations   - Diurese with Lasix 40 mg PO BID  - Strict In's and Out's   - Daily weights, patient's average weight is 166 lbs. - Obtain ECHO  - Trend troponins  - Cardiac diet with low sodium and potassium     Hx of DVT  - Continue home Eliquis     Hypertension; Current BP: 157/94  -Hydralazine 25 mg TID  -Continue home Labetalol 300 mg BID      Hyperlipidemia; Lipid Panel 16 Tchol: 226 LDL: 129 HDL: 103  -Continue Zocor qHS     Parkinson's Disease  - Continue home Sinemet     Constipation  - Hold OTC Ex-lax      FEN/GI - Cardiac diet. Activity - Ambulate with assistance  DVT prophylaxis - Eliquis  GI prophylaxis - None  Disposition - Plan to d/c to [facility].      CODE STATUS:  FULL CODE    Pt to be discussed with Dr. Miladis Ovalle (on-call attending physician)     Subjective: \"Im feeling better\"    Pt was seen and examined at bedside. Concerns overnight include: none. Denies chest pain, SOB, nausea, vomiting, abdominal pain, dizziness. Objective:   Physical examination  Visit Vitals    BP (!) 181/106 (BP 1 Location: Left arm, BP Patient Position: At rest)    Pulse 73    Temp 98.2 °F (36.8 °C)    Resp 20    Ht 5' 5\" (1.651 m)    Wt 170 lb 8 oz (77.3 kg)    SpO2 97%    BMI 28.37 kg/m2      Temp (24hrs), Av.8 °F (36.6 °C), Min:97.3 °F (36.3 °C), Max:98.2 °F (36.8 °C)         O2 Device: Room air      Date 17 0700 - 17 0659 17 0700 - 17 0659   Shift 3831-3780 6445-2131 24 Hour Total [id number] 0736-4839 24 Hour Total   I  N  T  A  K  E   P.O.  310 310         P. O.  310 310       Shift Total  (mL/kg)  310  (4) 310  (4)      O  U  T  P  U  T   Urine  (mL/kg/hr)  1600 1600         Urine Voided  1600 1600       Stool  0 0         Stool  0 0       Shift Total  (mL/kg)  1600  (20.7) 1600  (20.7)      NET  -1290 -1290      Weight (kg) 81.6 77.3 77.3 77.3 77.3 77.3         Last 3 shifts:         General: No acute distress. Alert. Cooperative. Poor historian. Head: Normocephalic. Atraumatic. Eyes:  Conjunctiva pink. Sclera white. PERRL. Nose:  Septum midline. Mucosa pink. No drainage. Throat: Mucosa pink. Moist mucous membranes. No tonsillar exudates or erythema. Palate movement equal bilaterally. Neck: Supple. Normal ROM. No stiffness. No JVD. Respiratory: CTAB. No w/r/r/c.   Cardiovascular: RRR. Normal S1,S2. No m/r/g. Pulses 2+ throughout. GI: + bowel sounds. Nontender. No rebound tenderness or guarding. Nondistended. Extremities: 3+ pitting edema of LE. No palpable cord. No tenderness. Musculoskeletal: Full ROM in all extremities. Neuro: CN II-XII grossly intact. Strength 5/5 in all extremities. Sensation intact in all extremities. DTRs 2+ throughout. Skin: Clear. No rashes. No ulcers.         Data Review:     Recent Labs      04/08/17   0043  04/07/17   1857   WBC  3.5*  3.3*   HGB  11.5*  11.4*   HCT  35.5*  36.8   PLT  177  171     Recent Labs      04/08/17   0043  04/07/17   1857   NA  143  144   K  3.6  4.1   CL  103  107   CO2  34*  29   GLU  155*  89   BUN  24*  25*   CREA  1.34*  1.25   CA  8.8  8.7   MG   --   1.9   ALB  3.8   --    TBILI  0.6   --    SGOT  22   --    ALT  14   --      Medications reviewed  Current Facility-Administered Medications   Medication Dose Route Frequency    labetalol (NORMODYNE) tablet 300 mg  300 mg Oral BID    sodium chloride (NS) flush 5-10 mL  5-10 mL IntraVENous Q8H    sodium chloride (NS) flush 5-10 mL  5-10 mL IntraVENous PRN    sertraline (ZOLOFT) tablet 50 mg  50 mg Oral QHS    simvastatin (ZOCOR) tablet 40 mg  40 mg Oral QHS    carbidopa-levodopa (SINEMET)  mg per tablet 1 Tab  1 Tab Oral TID    apixaban (ELIQUIS) tablet 2.5 mg  2.5 mg Oral BID    hydrALAZINE (APRESOLINE) tablet 25 mg  25 mg Oral TID    furosemide (LASIX) tablet 40 mg  40 mg Oral BID         Signed:   Chano Herron MD   Resident, [facility] Problems  Date Reviewed: 12/14/2016          Codes Class Noted POA    CHF exacerbation ([email or web address] ICD-10-CM: I50.9  ICD-9-CM: 428.0  4/7/2017 Unknown        * (Principal)Acute exacerbation of CHF (congestive heart failure) ([facility] [email or web address] ICD-10-CM: I50.9  ICD-9-CM: 428.0  4/7/2017 Unknown        Diastolic dysfunction with chronic heart failure ([facility] [email or web address] ICD-10-CM: I50.32  ICD-9-CM: 428.32  12/21/2015 Yes        DVT (deep venous thrombosis) ([facility] [email or web address] ICD-10-CM: I82.409  ICD-9-CM: 453.40  12/2/2015 Yes    Overview Signed 12/2/2015  1:41 PM by Nery Velásquez MD     On Eliquis             Parkinsons disease [facility]) ICD-10-CM: Tamie Pulling  ICD-9-CM: 332.0  5/26/2015 Yes        Lipid disorder ICD-10-CM: E78.9  ICD-9-CM: 272.9  8/16/2012 Yes    Overview Signed 8/16/2012  1:36 PM by Nery Velásquez MD     LDL goal < 70             Orthostatic hypotension (Chronic) ICD-10-CM: I95.1  ICD-9-CM: 458.0  Unknown Yes    Overview Signed 5/12/2011  2:55 PM by Sotero Goins MD     Complaints of dizziness.  (lying) to 120 (standing) in 3/18/11.              HTN (hypertension) ICD-10-CM: I10  ICD-9-CM: 401.9  3/19/2011 Yes    Overview Signed 5/23/2011  8:08 AM by Calixto Augirre MD     ECHO 5/2011 = LVH, EF 50%

## 2017-04-08 NOTE — H&P
[address] [facility]  
Admission H&P Date of admission: 4/7/2017 Patient name: Shakir Velazco MRN: [id number] YOB: 1935 Age: 80 y.o. Primary care provider:  Courtney Chopra MD  
 
Source of Information: patient, medical records Chief complaint:  Leg swelling History of Present Illness Shakir Velazco is a 80 y.o. male with Hx of DD-CHF, HTN, hypercholesteremia, parkinson disease, DM type II, DVT, and orthostatic hypotension who presents to the ER complaining of leg and foot swelling for a week. Most of the Hx is given by his daughter. Since a week ago, patient have been experiencing increased leg swelling along with fatigue, dry cough, and decrease in urination. He have noted that he have needed to use more pillows to sleep, and that his fatigue and cough is better while sit up. Daughter remarks that he have been having some difficulties at answering questions and following commands for about 1 month. She also states that the patient used to have 2 \"water pills\" but 1 of them was discontinued by PCP recently for intolerance to the medication. Denies CP, SOB, abdominal pain, falls, or any other complains at this moment. In the ER, vital signs were remarkable for /86. Labs were remarkable for WBC 3.3, Hgb 11.4, BUN 25, Trop 0.12, proBNP 2968. CXR showed no acute changes. Pt was treated with ASA, Lasix. Home Medications Prior to Admission medications Medication Sig Start Date End Date Taking? Authorizing Provider  
potassium chloride SR (KLOR-CON 10) 10 mEq tablet Take 10 mEq by mouth two (2) times a day. Yes Historical Provider  
sertraline (ZOLOFT) 50 mg tablet Take 50 mg by mouth nightly. Yes Historical Provider  
therapeutic multivitamin (THERAGRAN) tablet Take 1 Tab by mouth daily. Yes Historical Provider  
simvastatin (ZOCOR) 40 mg tablet Take 1 Tab by mouth nightly.  For cholesterol 12/19/16  Yes Courtney Chopra MD carbidopa-levodopa (SINEMET)  mg per tablet Take 1 Tab by mouth three (3) times daily. 11/29/16  Yes Marlena Chavira MD  
furosemide (LASIX) 40 mg tablet Take 1 Tab by mouth daily. 11/17/16  Yes Finley Bumpers, MD  
labetalol (NORMODYNE) 300 mg tablet Take 1 Tab by mouth two (2) times a day. 11/17/16  Yes Finley Bumpers, MD  
apixaban (ELIQUIS) 2.5 mg tablet TAKE ONE TABLET BY MOUTH TWICE DAILY 11/17/16  Yes Finley Bumpers, MD  
 
 
Allergies No Known Allergies Past Medical History:  
Diagnosis Date  Arthritis 5/5/2010  Diabetes mellitus type 2, diet-controlled ([facility]  DVT (deep venous thrombosis) ([facility] right LE DVT (mostly distal and non-occlusive) discovered 6/15  
 HTN (hypertension) 5/5/2010  
 hypertensive heart disease (LVH)  Hypercholesteremia 5/5/2010  in 4/09  Orthostatic hypotension Complaints of dizziness.  (lying) to 120 (standing) in 3/18/11.  Parkinson disease ([facility] diagnosed around middle of may 2015 No past surgical history on file. Family History Problem Relation Age of Onset  Heart Disease Mother  Diabetes Mother  Cancer Mother  Heart Disease Father  Asthma Father Social History Patient resides 
x  Independently With [facility] Ambulates Independently   
x  With cane    
x  Assisted walker Alcohol history None  
x  Social  
  Chronic Smoking history None  
x  Former smoker (1 PPD for 10 yrs, last cig 12 mo ago) Current smoker History Smoking Status  Former Smoker  Packs/day: 0.30  Years: 5.00  Types: Cigarettes, Pipe Smokeless Tobacco  
 Former User  Types: Snuff Drug history 
x  None Former drug user Current drug user Sexual history Sexually active Not sexually active Code status 
x  Full code DNR/DNI Partial   
Code status discussed with the patient/caregivers.  
 
 
Review of Systems (negative unless in bold) Constitutional: Negative for chills, fatigue, fever and weight loss. HENT: Negative for sore throat. Eyes: Negative for blurred vision and double vision. Respiratory: Negative for cough, hemoptysis and wheezing. Cardiovascular: Negative for chest pain, orthopnea and leg swelling. Gastrointestinal: Negative for abdominal pain, diarrhea, heartburn, nausea and vomiting. Genitourinary: Negative for dysuria, decreased frequency and amount,  and urgency. Musculoskeletal: Negative for joint pain. Negative for myalgias. Skin: Negative for itching and rash. Neurological: Negative for dizziness, seizures and headaches. Decreased mental acuity Endo/Heme/Allergies: Negative for environmental allergies. Negative for polydipsia. Does not bruise/bleed easily. Psychiatric/Behavioral: Negative for depression and suicidal ideas. Physical Exam 
Visit Vitals  BP (!) 180/105 (BP 1 Location: Left arm, BP Patient Position: Post activity)  Pulse 80  Temp 98.2 °F (36.8 °C)  Resp 22  
 Ht 5' 5\" (1.651 m)  Wt 180 lb (81.6 kg)  SpO2 100%  BMI 29.95 kg/m2 General: No acute distress. Alert. Cooperative. Poor historian. Head: Normocephalic. Atraumatic. Eyes:  Conjunctiva pink. Sclera white. PERRL. Nose:  Septum midline. Mucosa pink. No drainage. Throat: Mucosa pink. Moist mucous membranes. No tonsillar exudates or erythema. Palate movement equal bilaterally. Neck: Supple. Normal ROM. No stiffness. No JVD. Respiratory: CTAB. No w/r/r/c.  
Cardiovascular: RRR. Normal S1,S2. No m/r/g. Pulses 2+ throughout. GI: + bowel sounds. Nontender. No rebound tenderness or guarding. Nondistended. Extremities: 3+ pitting edema of LE. No palpable cord. No tenderness. Musculoskeletal: Full ROM in all extremities. Neuro: CN II-XII grossly intact. Strength 5/5 in all extremities. Sensation intact in all extremities. DTRs 2+ throughout. Skin: Clear.  No rashes. No ulcers. : Deferred Rectal: Deferred Laboratory Data Recent Results (from the past 24 hour(s)) CBC WITH AUTOMATED DIFF Collection Time: 04/07/17  6:57 PM  
Result Value Ref Range WBC 3.3 (L) 4.1 - 11.1 K/uL  
 RBC 3.91 (L) 4.10 - 5.70 M/uL  
 HGB 11.4 (L) 12.1 - 17.0 g/dL HCT 36.8 36.6 - 50.3 % MCV 94.1 80.0 - 99.0 FL  
 MCH 29.2 26.0 - 34.0 PG  
 MCHC 31.0 30.0 - 36.5 g/dL  
 RDW 14.6 (H) 11.5 - 14.5 % PLATELET 438 609 - 811 K/uL NEUTROPHILS 45 32 - 75 % LYMPHOCYTES 39 12 - 49 % MONOCYTES 12 5 - 13 % EOSINOPHILS 3 0 - 7 % BASOPHILS 1 0 - 1 %  
 ABS. NEUTROPHILS 1.5 (L) 1.8 - 8.0 K/UL  
 ABS. LYMPHOCYTES 1.3 0.8 - 3.5 K/UL  
 ABS. MONOCYTES 0.4 0.0 - 1.0 K/UL  
 ABS. EOSINOPHILS 0.1 0.0 - 0.4 K/UL  
 ABS. BASOPHILS 0.0 0.0 - 0.1 K/UL METABOLIC PANEL, BASIC Collection Time: 04/07/17  6:57 PM  
Result Value Ref Range Sodium 144 136 - 145 mmol/L Potassium 4.1 3.5 - 5.1 mmol/L Chloride 107 97 - 108 mmol/L  
 CO2 29 21 - 32 mmol/L Anion gap 8 5 - 15 mmol/L Glucose 89 65 - 100 mg/dL BUN 25 (H) 6 - 20 MG/DL Creatinine 1.25 0.70 - 1.30 MG/DL  
 BUN/Creatinine ratio 20 12 - 20 GFR est AA >60 >60 ml/min/1.73m2 GFR est non-AA 55 (L) >60 ml/min/1.73m2 Calcium 8.7 8.5 - 10.1 MG/DL  
CK W/ REFLX CKMB Collection Time: 04/07/17  6:57 PM  
Result Value Ref Range  39 - 308 U/L  
TROPONIN I Collection Time: 04/07/17  6:57 PM  
Result Value Ref Range Troponin-I, Qt. 0.12 (H) <0.05 ng/mL MAGNESIUM Collection Time: 04/07/17  6:57 PM  
Result Value Ref Range Magnesium 1.9 1.6 - 2.4 mg/dL PRO-BNP Collection Time: 04/07/17  6:57 PM  
Result Value Ref Range NT pro-BNP 2968 (H) 0 - 450 PG/ML  
EKG, 12 LEAD, INITIAL Collection Time: 04/07/17  7:05 PM  
Result Value Ref Range Ventricular Rate 79 BPM  
 Atrial Rate 79 BPM  
 P-R Interval 154 ms QRS Duration 92 ms Q-T Interval 422 ms  QTC Calculation (Bezet) 483 ms Calculated P Axis 56 degrees Calculated R Axis -15 degrees Calculated T Axis 77 degrees Diagnosis Normal sinus rhythm Moderate voltage criteria for LVH, may be normal variant Nonspecific T wave abnormality Prolonged QT Abnormal ECG When compared with ECG of 18-DEC-2015 19:58, No significant change was found Imaging Clinical indication: Shortness of breath. 
  
Frontal AP upright and lateral view of the chest obtained, comparison to 2015. The heart size is normal. There is no acute infiltrate. 
  
IMPRESSION 
impression: No acute changes. EKG:  normal EKG, normal sinus rhythm, unchanged from previous tracings, nonspecific T waves changes. Assessment and Plan Cornel Knox is a 80 y.o. male who is admitted for CHF exacerbation. CHF exacerbation; ECHO on 12/19/15 reveals EF 55%. BNP is 2968. Patient is non compliant with medication and with proper medical follow up. - Admit to telemetry - Cardiology consulted; follow up recomendations - Diurese with Lasix 40 mg PO BID 
- Strict In's and Out's - Daily weights, patient's average weight is 166 lbs. - Obtain ECHO 
- Trend troponins - Cardiac diet with low sodium and potassium Hx of DVT 
- Continue home Eliquis Hypertension; Current BP: 157/94 -Hydralazine 25 mg TID 
-Continue home Labetalol 300 mg BID Hyperlipidemia; Lipid Panel 12/14/16 Tchol: 226 LDL: 129 HDL: 103 
-Continue Zocor qHS Parkinson's Disease - Continue home Sinemet Constipation 
- Hold OTC Ex-lax FEN/GI - Cardiac diet. Activity - Ambulate with assistance DVT prophylaxis - Eliquis GI prophylaxis -  None Disposition - Plan to d/c to TBA. CODE STATUS:  FULL CODE Patient to be discussed with Dr. Hector Almazan MD 
Family Medicine Resident Hospital Problems Hospital Problems  Date Reviewed: 12/14/2016 Codes Class Noted POA  
 [facility] exacerbation ([facility] ICD-10-CM: I50.9 ICD-9-CM: 428.0  4/7/2017 Unknown Acute exacerbation of CHF (congestive heart failure) (HCC) ICD-10-CM: I50.9 ICD-9-CM: 428.0  4/7/2017 Unknown

## 2017-04-08 NOTE — ED NOTES
Report called for admit to Claudean Fries, RN SBAR accepted and questioned answered, patient transported via stretcher on monitor

## 2017-04-08 NOTE — PROGRESS NOTES
[address] [facility]  
Senior Resident Admission Note CC: Foot swelling HPI: 
Toshia Singh is a 80 y.o. male who presents to the ER complaining of foot swelling x 1 week. Wasn't too concerned at first because he thought the swelling was due to standing up too much. Came in because it wasn't getting better and was still bad when he woke up. Reports his daughter prepares his daily meds for him, but he says sometimes he forgets about his medicines. Does not check daily weights. Says his diet hasn't changed too much recently. He can't tell me the amount of canned goods or frozen meals/foods he eats. Drinks a lot of water. Chart reviewed. Patient seen, examined, and discussed with Dr. Arsenio Alatorre (PGY-1). See his note for more details. Physical Exam: 
Visit Vitals  BP (!) 180/105 (BP 1 Location: Left arm, BP Patient Position: Post activity)  Pulse 80  Temp 98.2 °F (36.8 °C)  Resp 22  
 Ht 5' 5\" (1.651 m)  Wt 180 lb (81.6 kg)  SpO2 100%  BMI 29.95 kg/m2 Gen: Awake, alert, NAD Eye: PERRL, EOMI Cards: RRR, no m/r/g Resp: CTAB, no w/r/r Abd: Normal BS, NT, ND Ext: 2+ b/l LE edema to level of knees. No palpable cord. No tenderness. Notable labs: 
Wbc 3.3, hgb 11.4, Cr 1.25, troponin 0.12, proBNP 2968 Imaging: CXR Results  (Last 48 hours) 04/07/17 1937  XR CHEST PA LAT Final result Impression:   impression: No acute changes. Narrative:  Clinical indication: Shortness of breath. Frontal AP upright and lateral view of the chest obtained, comparison to 2015. The heart size is normal. There is no acute infiltrate. A/P:  
81 y/o male with h/o diastolic CHF, HTN, H1QR, HLD, DVT, Parkinson's disease, orthostatic hypotension who is admitted for acute CHF exacerbation. 1. Acute CHF exacerbation: Does not appear to have acute infection.  Presentation possibly 2/2 medication misuse or inappropriate diet/excessive fluid intake. Pt also has h/o chronic HTN. He has been unable to tolerate multiple medications in the past due to orthostatic hypotension. Pt is/p 40 mg IV lasix in the ED. Will monitor strict Is/Os and daily weights. Reassess in am to determine lasix dosing. Caution with BP control given pt's h/o orthostatic hypotension. Will trend trops. Will get updated echo. Will consult pt's Cardiologist. [address] Cardiology assistance. I agree with remaining assessment and plan as documented in Dr. Guerline Dixon note. Pt discussed with Dr. Kei Walsh (on-call attending physician).  
 
Rochelle Parra MD 
Family Medicine Resident, PGY-2

## 2017-04-08 NOTE — PROGRESS NOTES
Shift Summary  4/8/2017  [id number]    Pt asleep at time of bedside shift report received from Pru RN. AM vitals, assessment, and meds complete without difficulty, AM rhythm strip shows SR w/BBB. Reviewed today's plan of care and goals with patient. Echocardiogram completed this morning. Pt has been up to chair for the majority of the day, no complaints. Pt voiding per urinal but only 100ml at a time. Pt has had elevated BPs which have returned to WNL with administration of scheduled BP meds. No visitors or contact with patient's family during my shift. Bedside and Verbal shift change report given to Pru (oncoming nurse) by Jennifer Christiansen (offgoing nurse). Report included the following information SBAR, Kardex, Procedure Summary, Intake/Output, MAR, Accordion, Recent Results and Cardiac Rhythm SR w/BBB.

## 2017-04-08 NOTE — PROGRESS NOTES
SHIFT REPORT:  1900- Bedside shift change report given to Paresh Rodriguez RN (oncoming nurse) by Komal Brown RN (offgoing nurse). Report included the following information SBAR, Kardex, Procedure Summary, Intake/Output, Recent Results and Cardiac Rhythm. SHIFT SUMMARY:  6239-RHNVVN blood drawn for AM labs. 0415-up to BR using cane and 1 assist; flatus and small brown stool; fairly steady on feet, little wobbly on balance. END OF SHIFT REPORT:  0700-Bedside shift change report given to Hoda Rice RN (oncoming nurse) by Paresh Rodriguez RN (offgoing nurse). Report included the following information SBAR, Kardex, Procedure Summary, Intake/Output, Recent Results and Cardiac Rhythm .

## 2017-04-08 NOTE — PROGRESS NOTES
[facility]: MED RECONCILIATION    Comments/Recommendations:    Takes carbidopa/levodopa in the morning, around noon, and at bedtime    Medications added:   · MVI    Medications adjusted:  · Potassium to 10 mEq PO twice daily  · Sertraline to bedtime    Information obtained from: Patient's daughter who fills pill box for him    Significant PMH/Disease States:   Past Medical History:   Diagnosis Date    Arthritis 5/5/2010    Diabetes mellitus type 2, diet-controlled ([facility]     DVT (deep venous thrombosis) ([facility]     right LE DVT (mostly distal and non-occlusive) discovered 6/15    HTN (hypertension) 5/5/2010    hypertensive heart disease (LVH)    Hypercholesteremia 5/5/2010     in 4/09    Orthostatic hypotension     Complaints of dizziness.  (lying) to 120 (standing) in 3/18/11.  Parkinson disease ([facility]     diagnosed around middle of may 2015     Chief Complaint for this Admission:   Chief Complaint   Patient presents with    Foot Swelling     Allergies: Review of patient's allergies indicates no known allergies. Prior to Admission Medications:   Prior to Admission Medications   Prescriptions Last Dose Informant Patient Reported? Taking? apixaban (ELIQUIS) 2.5 mg tablet 4/7/2017 at AM Child No Yes   Sig: TAKE ONE TABLET BY MOUTH TWICE DAILY   carbidopa-levodopa (SINEMET)  mg per tablet 4/7/2017 at noon Child No Yes   Sig: Take 1 Tab by mouth three (3) times daily. furosemide (LASIX) 40 mg tablet 4/7/2017 at AM Child No Yes   Sig: Take 1 Tab by mouth daily. labetalol (NORMODYNE) 300 mg tablet 4/7/2017 at AM Child No Yes   Sig: Take 1 Tab by mouth two (2) times a day. potassium chloride SR (KLOR-CON 10) 10 mEq tablet 4/7/2017 at AM Child Yes Yes   Sig: Take 10 mEq by mouth two (2) times a day. sertraline (ZOLOFT) 50 mg tablet 4/6/2017 at HS Child Yes Yes   Sig: Take 50 mg by mouth nightly.    simvastatin (ZOCOR) 40 mg tablet 4/6/2017 at HS Child No Yes   Sig: Take 1 Tab by mouth nightly. For cholesterol   therapeutic multivitamin (THERAGRAN) tablet 4/7/2017 at AM Child Yes Yes   Sig: Take 1 Tab by mouth daily.         Thank you,  Susana Brunner, PharmD, [facility]

## 2017-04-08 NOTE — PROGRESS NOTES
SHIFT REPORT:  2237- Admission report given to Hayder Ford, RAMANDEEP [facility] nurse) by STEFANY Shepherd (ER nurse). Report included the following information SBAR, Kardex, Procedure Summary, Intake/Output, Recent Results and Cardiac Rhythm. SHIFT SUMMARY:  2245-Pt arrived by stretcher to room 316- moved to bed w help, Daughter at bedside. 0045-venous blood drawn for STAT trop and AM labs. 0400-gown and total linen changed for lag amt incont urine; follows directions well, verbally responsive  0520-venous blood drawn for AM labs. END OF SHIFT REPORT:  0700-Bedside shift change report given to Chantale Angulo RN (oncoming nurse) by Hayder Ford RN (offgoing nurse). Report included the following information SBAR, Kardex, Procedure Summary, Intake/Output, Recent Results and Cardiac Rhythm .

## 2017-04-08 NOTE — ROUTINE PROCESS
Primary Nurse Margie Chavez RN and Kevin Jerez., RN performed a dual skin assessment on this patient No impairment noted  Song score is 18

## 2017-04-09 LAB
ALBUMIN SERPL BCP-MCNC: 3.3 G/DL (ref 3.5–5)
ALBUMIN/GLOB SERPL: 1 {RATIO} (ref 1.1–2.2)
ALP SERPL-CCNC: 70 U/L (ref 45–117)
ALT SERPL-CCNC: <6 U/L (ref 12–78)
ANION GAP BLD CALC-SCNC: 7 MMOL/L (ref 5–15)
AST SERPL W P-5'-P-CCNC: 19 U/L (ref 15–37)
ATRIAL RATE: 79 BPM
BILIRUB SERPL-MCNC: 0.6 MG/DL (ref 0.2–1)
BUN SERPL-MCNC: 24 MG/DL (ref 6–20)
BUN/CREAT SERPL: 21 (ref 12–20)
CALCIUM SERPL-MCNC: 8.7 MG/DL (ref 8.5–10.1)
CALCULATED P AXIS, ECG09: 56 DEGREES
CALCULATED R AXIS, ECG10: -15 DEGREES
CALCULATED T AXIS, ECG11: 77 DEGREES
CHLORIDE SERPL-SCNC: 103 MMOL/L (ref 97–108)
CO2 SERPL-SCNC: 31 MMOL/L (ref 21–32)
CREAT SERPL-MCNC: 1.12 MG/DL (ref 0.7–1.3)
DIAGNOSIS, 93000: NORMAL
ERYTHROCYTE [DISTWIDTH] IN BLOOD BY AUTOMATED COUNT: 14.5 % (ref 11.5–14.5)
GLOBULIN SER CALC-MCNC: 3.2 G/DL (ref 2–4)
GLUCOSE SERPL-MCNC: 106 MG/DL (ref 65–100)
HCT VFR BLD AUTO: 36 % (ref 36.6–50.3)
HGB BLD-MCNC: 11.5 G/DL (ref 12.1–17)
MCH RBC QN AUTO: 29.6 PG (ref 26–34)
MCHC RBC AUTO-ENTMCNC: 31.9 G/DL (ref 30–36.5)
MCV RBC AUTO: 92.5 FL (ref 80–99)
P-R INTERVAL, ECG05: 154 MS
PLATELET # BLD AUTO: 165 K/UL (ref 150–400)
POTASSIUM SERPL-SCNC: 4.3 MMOL/L (ref 3.5–5.1)
PROT SERPL-MCNC: 6.5 G/DL (ref 6.4–8.2)
Q-T INTERVAL, ECG07: 422 MS
QRS DURATION, ECG06: 92 MS
QTC CALCULATION (BEZET), ECG08: 483 MS
RBC # BLD AUTO: 3.89 M/UL (ref 4.1–5.7)
SODIUM SERPL-SCNC: 141 MMOL/L (ref 136–145)
VENTRICULAR RATE, ECG03: 79 BPM
WBC # BLD AUTO: 2.8 K/UL (ref 4.1–11.1)

## 2017-04-09 PROCEDURE — 74011250637 HC RX REV CODE- 250/637: Performed by: HOSPITALIST

## 2017-04-09 PROCEDURE — 36415 COLL VENOUS BLD VENIPUNCTURE: CPT | Performed by: FAMILY MEDICINE

## 2017-04-09 PROCEDURE — 74011250637 HC RX REV CODE- 250/637: Performed by: FAMILY MEDICINE

## 2017-04-09 PROCEDURE — 65660000000 HC RM CCU STEPDOWN

## 2017-04-09 PROCEDURE — 80053 COMPREHEN METABOLIC PANEL: CPT | Performed by: FAMILY MEDICINE

## 2017-04-09 PROCEDURE — 85027 COMPLETE CBC AUTOMATED: CPT | Performed by: FAMILY MEDICINE

## 2017-04-09 RX ADMIN — Medication 10 ML: at 16:22

## 2017-04-09 RX ADMIN — LOSARTAN POTASSIUM 25 MG: 25 TABLET, FILM COATED ORAL at 16:22

## 2017-04-09 RX ADMIN — LABETALOL HCL 300 MG: 200 TABLET, FILM COATED ORAL at 17:38

## 2017-04-09 RX ADMIN — SERTRALINE 50 MG: 50 TABLET, FILM COATED ORAL at 22:09

## 2017-04-09 RX ADMIN — FUROSEMIDE 40 MG: 40 TABLET ORAL at 09:18

## 2017-04-09 RX ADMIN — CARBIDOPA AND LEVODOPA 1 TABLET: 25; 250 TABLET ORAL at 09:18

## 2017-04-09 RX ADMIN — Medication 10 ML: at 22:09

## 2017-04-09 RX ADMIN — LABETALOL HCL 300 MG: 200 TABLET, FILM COATED ORAL at 04:23

## 2017-04-09 RX ADMIN — SIMVASTATIN 40 MG: 20 TABLET, FILM COATED ORAL at 22:09

## 2017-04-09 RX ADMIN — APIXABAN 2.5 MG: 2.5 TABLET, FILM COATED ORAL at 09:19

## 2017-04-09 RX ADMIN — CARBIDOPA AND LEVODOPA 1 TABLET: 25; 250 TABLET ORAL at 22:09

## 2017-04-09 RX ADMIN — APIXABAN 2.5 MG: 2.5 TABLET, FILM COATED ORAL at 17:38

## 2017-04-09 RX ADMIN — FUROSEMIDE 40 MG: 40 TABLET ORAL at 17:38

## 2017-04-09 RX ADMIN — CARBIDOPA AND LEVODOPA 1 TABLET: 25; 250 TABLET ORAL at 16:00

## 2017-04-09 NOTE — PROGRESS NOTES
TRANSFER - IN REPORT:    Verbal report received from [place] [facility] (name) on Douglas Macias  being received from [facility] (unit) for routine progression of care      Report consisted of patients Situation, Background, Assessment and   Recommendations(SBAR). Information from the following report(s) SBAR, Kardex, Procedure Summary, Intake/Output, MAR and Accordion was reviewed with the receiving nurse. Opportunity for questions and clarification was provided. Assessment completed upon patients arrival to unit and care assumed.          Primary Nurse Karen Barroso RN and Ernestine Johnson RN performed a dual skin assessment on this patient heel    Song score is tenderness noted Song score of  18

## 2017-04-09 NOTE — PROGRESS NOTES
Samir Garcia [address] Office [phone] Fax [phone] Assessment and Plan Mary Callejas is a 80 y.o. male admitted for CHF exacerbation. He has spent 2 night(s) in the hospital. 
 
24 Hour Events: No acute events. CHF exacerbation; Echo at this episode EF 55-60% with Grade 2 diastolic dysfunction. BNP is 2968. Patient is non compliant with medication and with follow up. - Diurese with Lasix 40 mg PO BID 
- Strict In's and Out's - unfortunately he has had unmeasured voids. 
- Daily weights, patient's average weight is 166 lbs. Weight stable here, unclear if it's accurate. - Cardiac diet with low sodium and potassium 
- He reports he feels better and has continued to saturate well on RA.  
- Needs rehab placement as he isn't functional enough to be safe at home alone at this time. Hx of DVT 
- Continue home Eliquis 
   
Hypertension; Current BP: 157/94 -Hydralazine 25 mg TID 
-Continue home Labetalol 300 mg BID  
   
Hyperlipidemia; Lipid Panel 16 Tchol: 226 LDL: 129 HDL: 103 
-Continue Zocor qHS 
   
Parkinson's Disease - Continue home Sinemet 
   
Constipation 
- Hold OTC Ex-lax FEN/GI - Cardiac diet. Activity - Ambulate with assistance DVT prophylaxis - Pt is on Eliquis GI prophylaxis - Not indicated at this time Admission Status - Admitted Disposition - Plan to d/c to Rehab. Code Status - Full I appreciate the opportunity to participate in the care of this patient, 
Beryle Fortune, MD 
[facility] Medicine Resident Subjective / Objective Subjective: 
Symptoms:  Improved. He reports weakness. No shortness of breath, malaise, chest pain or chest pressure. Diet:  Adequate intake. Activity level: Impaired due to weakness. Pain:  He complains of pain that is mild. Pain is well controlled. (Some slight heel pain). Temp (24hrs), Av.8 °F (36.6 °C), Min:96.6 °F (35.9 °C), Max:98.7 °F (37.1 °C) Objective: General Appearance:  Comfortable, well-appearing, in no acute distress and not in pain. Vital signs: (most recent): Blood pressure 113/59, pulse 72, temperature 97.7 °F (36.5 °C), resp. rate 24, height 5' 5\" (1.651 m), weight 172 lb 2.9 oz (78.1 kg), SpO2 97 %. Vital signs are normal.  No fever. Output: Producing urine. Lungs:  Normal respiratory rate and normal effort. He is not in respiratory distress. Breath sounds clear to auscultation. No wheezes. (Very slight crackles at the bases still) Heart: Normal rate. Regular rhythm. S1 normal and S2 normal.   
Extremities: There is no deformity or dependent edema. Neurological: Patient is alert and oriented to person, place and time. Skin:  Warm and dry. Abdomen: Abdomen is soft and non-distended. Bowel sounds are normal.   There is no abdominal tenderness. Respiratory:   O2 Device: Room air I/O: 
 
Date 04/08/17 0700 - 04/09/17 2037 04/09/17 0700 - 04/10/17 3339 Shift [id number] 1900-0659 24 Hour Total 1603-8206 5390-1068 24 Hour Total  
I 
N 
T 
A 
K 
E 
 P.O. 120 180 300 120  120  
   P. O. 120 180 300 120  120 Shift Total 
(mL/kg) 120 
(1.6) 180 
(2.3) 300 
(3.8) 120 
(1.5)  120 
(1.5) O 
U T 
P 
U Jodie Cava Urine (mL/kg/hr) 200 
(0.2) 1000 
(1.1) 1200 
(0.6) 125  125 Urine Voided 200 1000 1200 125  125 Urine Occurrence(s) 1 x  1 x 2 x  2 x Stool  2 2 Stool Occurrence(s) 2 x  2 x Stool  2 2 Shift Total 
(mL/kg) 200 
(2.6) 1002 
(12.8) 1202 
(15.4) 125 
(1.6)  125 
(1.6) NET -80 -822 -902 -5  -5 Weight (kg) 77.3 78.1 78.1 78.1 78.1 78.1  
 
 
CBC: 
Recent Labs 04/09/17 
 0232  04/08/17 
 0043  04/07/17 
 1857 WBC  2.8*  3.5*  3.3* HGB  11.5*  11.5*  11.4* HCT  36.0*  35.5*  36.8 PLT  165  177  171 Metabolic Panel: 
Recent Labs 04/09/17 
 0232  04/08/17 
 0043  04/07/17 
 1857 NA  141  143  144  
K  4.3  3.6  4.1 CL  103  103  107 CO2  31  34*  29 BUN  24* 24*  25* CREA  1.12  1.34*  1.25 GLU  106*  155*  89  
CA  8.7  8.8  8.7 MG   --    --   1.9 ALB  3.3*  3.8   --   
SGOT  19  22   --   
ALT  <6*  14   -- For Billing Chief Complaint Patient presents with  Foot Swelling Hospital Problems  Date Reviewed: 4/8/2017 Codes Class Noted POA * (Principal)Acute on chronic diastolic CHF (congestive heart failure) ([facility]) ICD-10-CM: I50.33 ICD-9-CM: 428.33, 428.0  4/7/2017 Yes Diastolic dysfunction with chronic heart failure ([facility] ICD-10-CM: I50.32 
ICD-9-CM: 428.32  12/21/2015 Yes Accelerated hypertension ICD-10-CM: I10 
ICD-9-CM: 401.0  12/18/2015 Yes DVT (deep venous thrombosis) ([facility]) ICD-10-CM: I82.409 ICD-9-CM: 453.40  12/2/2015 Yes Overview Signed 12/2/2015  1:41 PM by Oscar Stuart MD  
  On Eliquis Parkinsons disease ([facility] ICD-10-CM: G20 
ICD-9-CM: 332.0  5/26/2015 Yes Lipid disorder ICD-10-CM: E78.9 ICD-9-CM: 272.9  8/16/2012 Yes Overview Signed 8/16/2012  1:36 PM by Oscar Stuart MD  
  LDL goal < 70 Orthostatic hypotension (Chronic) ICD-10-CM: I95.1 ICD-9-CM: 458.0  Unknown Yes Overview Signed 5/12/2011  2:55 PM by Mayra Contreras MD  
  Complaints of dizziness.  (lying) to 120 (standing) in 3/18/11. HTN (hypertension) ICD-10-CM: I10 
ICD-9-CM: 401.9  3/19/2011 Yes Overview Signed 5/23/2011  8:08 AM by Baljit Noyola MD  
  ECHO 5/2011 = LVH, EF 50%

## 2017-04-09 NOTE — PROGRESS NOTES
TRANSFER - OUT REPORT:    Verbal report given to Willy Reina (name) on Forrestine Beards  being transferred to 5th floor (unit) for routine progression of care       Report consisted of patients Situation, Background, Assessment and   Recommendations(SBAR). Information from the following report(s) SBAR, Kardex, Intake/Output, MAR and Recent Results was reviewed with the receiving nurse. Lines:   Peripheral IV 04/07/17 Right Antecubital (Active)   Site Assessment Clean, dry, & intact 4/9/2017  2:42 PM   Phlebitis Assessment 0 4/9/2017  2:42 PM   Infiltration Assessment 0 4/9/2017  2:42 PM   Dressing Status Clean, dry, & intact 4/9/2017  2:42 PM   Dressing Type Transparent;Tape 4/9/2017  2:42 PM   Hub Color/Line Status Pink;Capped 4/9/2017  2:42 PM   Action Taken Open ports on tubing capped 4/8/2017  3:08 AM   Alcohol Cap Used Yes 4/9/2017  2:42 PM        Opportunity for questions and clarification was provided.       Patient transported with:   [employer]

## 2017-04-09 NOTE — PROGRESS NOTES
Bedside and Verbal shift change report given to [facility] RN (oncoming nurse) by Currie Primrose RN (offgoing nurse). Report included the following information SBAR, Kardex, Procedure Summary, Intake/Output and MAR.

## 2017-04-10 LAB
ALBUMIN SERPL BCP-MCNC: 3.3 G/DL (ref 3.5–5)
ALBUMIN/GLOB SERPL: 1 {RATIO} (ref 1.1–2.2)
ALP SERPL-CCNC: 70 U/L (ref 45–117)
ALT SERPL-CCNC: 6 U/L (ref 12–78)
ANION GAP BLD CALC-SCNC: 10 MMOL/L (ref 5–15)
AST SERPL W P-5'-P-CCNC: 17 U/L (ref 15–37)
BILIRUB SERPL-MCNC: 0.6 MG/DL (ref 0.2–1)
BUN SERPL-MCNC: 24 MG/DL (ref 6–20)
BUN/CREAT SERPL: 21 (ref 12–20)
CALCIUM SERPL-MCNC: 8.6 MG/DL (ref 8.5–10.1)
CHLORIDE SERPL-SCNC: 102 MMOL/L (ref 97–108)
CO2 SERPL-SCNC: 28 MMOL/L (ref 21–32)
CREAT SERPL-MCNC: 1.13 MG/DL (ref 0.7–1.3)
ERYTHROCYTE [DISTWIDTH] IN BLOOD BY AUTOMATED COUNT: 14.4 % (ref 11.5–14.5)
GLOBULIN SER CALC-MCNC: 3.2 G/DL (ref 2–4)
GLUCOSE SERPL-MCNC: 94 MG/DL (ref 65–100)
HCT VFR BLD AUTO: 36.1 % (ref 36.6–50.3)
HGB BLD-MCNC: 11 G/DL (ref 12.1–17)
MCH RBC QN AUTO: 28.3 PG (ref 26–34)
MCHC RBC AUTO-ENTMCNC: 30.5 G/DL (ref 30–36.5)
MCV RBC AUTO: 92.8 FL (ref 80–99)
PLATELET # BLD AUTO: 171 K/UL (ref 150–400)
POTASSIUM SERPL-SCNC: 4 MMOL/L (ref 3.5–5.1)
PROT SERPL-MCNC: 6.5 G/DL (ref 6.4–8.2)
RBC # BLD AUTO: 3.89 M/UL (ref 4.1–5.7)
SODIUM SERPL-SCNC: 140 MMOL/L (ref 136–145)
WBC # BLD AUTO: 2.8 K/UL (ref 4.1–11.1)

## 2017-04-10 PROCEDURE — 97530 THERAPEUTIC ACTIVITIES: CPT

## 2017-04-10 PROCEDURE — 97116 GAIT TRAINING THERAPY: CPT

## 2017-04-10 PROCEDURE — 80053 COMPREHEN METABOLIC PANEL: CPT | Performed by: FAMILY MEDICINE

## 2017-04-10 PROCEDURE — 65660000000 HC RM CCU STEPDOWN

## 2017-04-10 PROCEDURE — 74011250637 HC RX REV CODE- 250/637: Performed by: FAMILY MEDICINE

## 2017-04-10 PROCEDURE — 97165 OT EVAL LOW COMPLEX 30 MIN: CPT | Performed by: OCCUPATIONAL THERAPIST

## 2017-04-10 PROCEDURE — 74011250637 HC RX REV CODE- 250/637: Performed by: HOSPITALIST

## 2017-04-10 PROCEDURE — 85027 COMPLETE CBC AUTOMATED: CPT | Performed by: FAMILY MEDICINE

## 2017-04-10 PROCEDURE — 97535 SELF CARE MNGMENT TRAINING: CPT | Performed by: OCCUPATIONAL THERAPIST

## 2017-04-10 PROCEDURE — 36415 COLL VENOUS BLD VENIPUNCTURE: CPT | Performed by: FAMILY MEDICINE

## 2017-04-10 RX ADMIN — APIXABAN 2.5 MG: 2.5 TABLET, FILM COATED ORAL at 09:35

## 2017-04-10 RX ADMIN — SIMVASTATIN 40 MG: 20 TABLET, FILM COATED ORAL at 21:23

## 2017-04-10 RX ADMIN — Medication 10 ML: at 22:03

## 2017-04-10 RX ADMIN — LABETALOL HCL 300 MG: 200 TABLET, FILM COATED ORAL at 17:13

## 2017-04-10 RX ADMIN — Medication 10 ML: at 05:46

## 2017-04-10 RX ADMIN — CARBIDOPA AND LEVODOPA 1 TABLET: 25; 250 TABLET ORAL at 09:35

## 2017-04-10 RX ADMIN — Medication 10 ML: at 15:04

## 2017-04-10 RX ADMIN — CARBIDOPA AND LEVODOPA 1 TABLET: 25; 250 TABLET ORAL at 17:09

## 2017-04-10 RX ADMIN — LABETALOL HCL 300 MG: 200 TABLET, FILM COATED ORAL at 05:45

## 2017-04-10 RX ADMIN — FUROSEMIDE 40 MG: 40 TABLET ORAL at 09:35

## 2017-04-10 RX ADMIN — CARBIDOPA AND LEVODOPA 1 TABLET: 25; 250 TABLET ORAL at 22:03

## 2017-04-10 RX ADMIN — APIXABAN 2.5 MG: 2.5 TABLET, FILM COATED ORAL at 17:13

## 2017-04-10 RX ADMIN — SERTRALINE 50 MG: 50 TABLET, FILM COATED ORAL at 21:24

## 2017-04-10 RX ADMIN — FUROSEMIDE 40 MG: 40 TABLET ORAL at 17:13

## 2017-04-10 RX ADMIN — LOSARTAN POTASSIUM 25 MG: 25 TABLET, FILM COATED ORAL at 17:13

## 2017-04-10 NOTE — PROGRESS NOTES
Bedside and Verbal shift change report given to [facility] (oncoming nurse) by Gladys Dunn RN (offgoing nurse). Report included the following information SBAR, Kardex, Intake/Output, MAR and Accordion.

## 2017-04-10 NOTE — PROGRESS NOTES
Problem: Mobility Impaired (Adult and Pediatric)  Goal: *Acute Goals and Plan of Care (Insert Text)  Physical Therapy Goals  Initiated 4/8/2017  1. Patient will move from supine to sit and sit to supine in bed with modified independence within 7 day(s). 2. Patient will transfer from bed to chair and chair to bed with supervision/set-up using the least restrictive device within 7 day(s). 3. Patient will perform sit to stand with supervision/set-up within 7 day(s). 4. Patient will ambulate with supervision/set-up for 100 feet with the least restrictive device within 7 day(s). 5. Patient will ascend/descend 4 stairs with 2 handrail(s) with minimal assistance/contact guard assist within 7 day(s). PHYSICAL THERAPY TREATMENT  Patient: Ivanna Carmona (23 y.o. male)  Date: 4/10/2017  Diagnosis: CHF exacerbation (HCC)  Acute exacerbation of CHF (congestive heart failure) (HCC) Acute on chronic diastolic CHF (congestive heart failure) ([facility] [address]       Precautions: Fall  Chart, physical therapy assessment, plan of care and goals were reviewed. ASSESSMENT:  Pt sitting in chair on arrival of PT. Pt sit to stand with max assist.Pt ambulated 20ft with RW min assist of 1. Pt reports using a cane prior to admit but will need a RW to stabilize gait. Pt left sitting. Continue goals. Progression toward goals:  [ ]      Improving appropriately and progressing toward goals  [X]      Improving slowly and progressing toward goals  [ ]      Not making progress toward goals and plan of care will be adjusted       PLAN:  Patient continues to benefit from skilled intervention to address the above impairments. Continue treatment per established plan of care.   Discharge Recommendations:  Inpatient Rehab vs Skilled Nursing Facility  Further Equipment Recommendations for Discharge:  rolling walker       SUBJECTIVE:          OBJECTIVE DATA SUMMARY:   Critical Behavior:  Neurologic State: Alert  Orientation Level: Oriented X4  Cognition: Follows commands  Safety/Judgement: Awareness of environment, Fall prevention, Insight into deficits  Functional Mobility Training:  Bed Mobility:     Supine to Sit: Supervision; Additional time     Scooting: Supervision; Additional time                    Transfers:  Sit to Stand: Maximum assistance  Stand to Sit: Minimum assistance                             Balance:  Sitting: Intact  Sitting - Static: Fair (occasional)  Sitting - Dynamic: Fair (occasional)  Standing: Impaired  Standing - Static: Constant support  Standing - Dynamic : Poor  Ambulation/Gait Training:  Distance (ft): 20 Feet (ft)  Assistive Device: Gait belt;Walker, rolling  Ambulation - Level of Assistance: Contact guard assistance;Minimal assistance        Gait Abnormalities: Decreased step clearance        Base of Support: Narrowed     Speed/[place]: Pace decreased (<100 feet/min)  Step Length: Left shortened;Right shortened                 Pain:  Pain Scale 1: Numeric (0 - 10)  Pain Intensity 1: 0              Activity Tolerance:   Pt tolerated treatment well. Please refer to the flowsheet for vital signs taken during this treatment.   After treatment:   [X] Patient left in no apparent distress sitting up in chair  [ ] Patient left in no apparent distress in bed  [ ] Call bell left within reach  [ ] Nursing notified  [ ] Caregiver present  [ ] Bed alarm activated      COMMUNICATION/COLLABORATION:   The patients plan of care was discussed with: Physical Therapist     Gil Dennis PTA   Time Calculation: 20 mins

## 2017-04-10 NOTE — CDMP QUERY
1.   Please clarify the use of the anticoagulant provided to this patient as:    Eliquis,  being prophylactically given to prevent the recurrence of a DVT  Eliquis, being given as treatment for a Chronic DVT  Other Explanation of clinical findings  Unable to Determine (no explanation of clinical findings)    The medical record reflects the following risk factors, clinical indicators, and treatment:    79 y/o male admitted for chf exacerbation. on admission diagnosis of \"history of dvt\" is given. Problem lists that pt has a DVT R leg  first diagnosed in 6/15. Pt is currently still on home medication of eliquis. Please clarify and document your clinical opinion in the progress notes and discharge summary including the definitive and/or presumptive diagnosis, (suspected or probable), related to the above clinical findings. Please include clinical findings supporting your diagnosis    Reference:  Acute DVT:  \"Acute\" defines the period of time beginning with the initial diagnosis, up to and including the entire period of time where anticoagulation is instituted (3-12 months)     Acute Recurrent DVT: \"Recurrent\" incorporates the definition of acute with the diagnosis of recurrent and ends 3-12 months beyond that time. These patients will likely require lifelong anticoagulation therapy. Chronic DVT:  \"Chronic\" treatment period extending beyond initial 12 months of treatment. Medical condition still exists and is actively being treated. Personal History of DVT: explains the patient's past medical condition that no longer exists, and is not receiving any treatment, but that has the potential for recurrence, and therefore may require monitoring    Thanks for your time.     Jazmine Omalley RN, BSN  [phone]

## 2017-04-10 NOTE — PROGRESS NOTES
Samir Garcia [address] Office [phone] Fax [phone] Assessment and Plan Veronica Francisco is a 80 y.o. male admitted for CHF exacerbation. He has spent 3 night(s) in the hospital. 
 
24 Hour Events: No acute events. Exacerbation of Hypertensive Heart Disease + Diastolic CHF (HFpEF) - Echo at this episode EF 55-60% with Grade 2 diastolic dysfunction. BNP is 2968. Patient is non-compliant with medication and with follow up. Patient back to his pre-admission weight today. - Diurese with Lasix 40 mg PO BID. Home dose is 40mg daily. 
- Strict In's and Out's. - Daily weights, patient's average weight is 166 lbs. - Cardiac diet with low sodium and potassium - Needs rehab placement as he isn't functional enough to be safe at home alone at this time. Hx of DVT 
- Continue home Eliquis (prophylactically given to prevent the recurrence of a DVT).    
Hypertension; BP elevated to 165/93 this morning. Cardiology discontinued hydralazine. 
-Continue home Labetalol 300 mg BID 
-Cozaar added on by cardiology. 
   
Hyperlipidemia; Lipid Panel 16 Tchol: 226 LDL: 129 HDL: 103 
-Continue Zocor qHS 
   
Parkinson's Disease - Continue home Sinemet Depression - stable. -Zoloft 
   
Constipation 
- Hold OTC Ex-lax FEN/GI - Cardiac diet. Activity - Ambulate with assistance DVT prophylaxis - Pt is on Eliquis GI prophylaxis - Not indicated at this time Admission Status - Admitted Disposition - Plan to d/c to Rehab. Code Status - Full Aliza Mariscal MD 
Family Medicine Resident Subjective / Objective Subjective: 
Symptoms:  Improved. No shortness of breath, malaise, chest pain, weakness or chest pressure. Diet:  Adequate intake. No nausea or vomiting. Pain:  He reports no pain. Patient reports to be feeling much better this morning. Feels 100% of his normal self.  
 
Temp (24hrs), Av.7 °F (36.5 °C), Min:97.1 °F (36.2 °C), Max:98 °F (36.7 °C) Objective: 
General Appearance:  Comfortable, well-appearing, in no acute distress and not in pain. Vital signs: (most recent): Blood pressure 155/81, pulse 84, temperature 97.8 °F (36.6 °C), resp. rate 18, height 5' 5\" (1.651 m), weight 167 lb 12.3 oz (76.1 kg), SpO2 98 %. Vital signs are normal.  No fever. Output: Producing urine. Lungs:  Normal respiratory rate and normal effort. He is not in respiratory distress. Breath sounds clear to auscultation. No stridor. No wheezes, rales (no rales appreciated on exam this morning.), rhonchi or decreased breath sounds. Heart: Normal rate. Regular rhythm. S1 normal and S2 normal.  No murmur, gallop or friction rub. Extremities: There is no deformity or dependent edema. Neurological: Patient is alert and oriented to person, place and time. Skin:  Warm and dry. Abdomen: Abdomen is soft and non-distended. Bowel sounds are normal.   There is no abdominal tenderness. Respiratory:   O2 Device: Room air I/O: 
 
Date 04/09/17 0700 - 04/10/17 4821 04/10/17 0700 - 04/11/17 2903 Shift 3744-7264 6620-7263 24 Hour Total 7253-3495 2139-5680 24 Hour Total  
I 
N 
T 
A 
K 
E 
 P.O. 120 200 320 50  50 P. O. 120 200 320 50  50 Shift Total 
(mL/kg) 120 
(1.5) 200 
(2.6) 320 
(4.2) 50 
(0.7)  50 
(0.7) O 
U T 
P 
U Bj Duffy Urine (mL/kg/hr) 375 
(0.4) 700 
(0.8) 1075 
(0.6) Urine Voided  Urine Occurrence(s) 2 x  2 x Shift Total 
(mL/kg) 375 
(4.8) 700 
(9.2) 1075 
(14.1) NET -255 -500 -210 89  28 Weight (kg) 78.1 76.1 76.1 76.1 76.1 76.1 CBC: 
Recent Labs 04/10/17 
 0448  04/09/17 
 5927  04/08/17 
 4292 WBC  2.8*  2.8*  3.5* HGB  11.0*  11.5*  11.5* HCT  36.1*  36.0*  35.5* PLT  171  165  177 Metabolic Panel: 
Recent Labs 04/10/17 
 0448  04/09/17 
 9012  04/08/17 
 0043  04/07/17 
 1857 NA  140  141  143  144  
K  4.0  4.3  3.6  4.1 CL  102  103  103 107  
CO2  28  31  34*  29 BUN  24*  24*  24*  25* CREA  1.13  1.12  1.34*  1.25  
GLU  94  106*  155*  89  
CA  8.6  8.7  8.8  8.7 MG   --    --    --   1.9 ALB  3.3*  3.3*  3.8   --   
SGOT  17  19  22   --   
ALT  6*  <6*  14   -- For Billing Chief Complaint Patient presents with  Foot Swelling Hospital Problems  Date Reviewed: 4/8/2017 Codes Class Noted POA * (Principal)Acute on chronic diastolic CHF (congestive heart failure) ([facility]) ICD-10-CM: I50.33 ICD-9-CM: 428.33, 428.0  4/7/2017 Yes Diastolic dysfunction with chronic heart failure ([facility] ICD-10-CM: I50.32 
ICD-9-CM: 428.32  12/21/2015 Yes Accelerated hypertension ICD-10-CM: I10 
ICD-9-CM: 401.0  12/18/2015 Yes DVT (deep venous thrombosis) ([facility]) ICD-10-CM: I82.409 ICD-9-CM: 453.40  12/2/2015 Yes Overview Signed 12/2/2015  1:41 PM by Mike Peña MD  
  On Eliquis Parkinsons disease ([facility] ICD-10-CM: G20 
ICD-9-CM: 332.0  5/26/2015 Yes Lipid disorder ICD-10-CM: E78.9 ICD-9-CM: 272.9  8/16/2012 Yes Overview Signed 8/16/2012  1:36 PM by Mike Peña MD  
  LDL goal < 70 Orthostatic hypotension (Chronic) ICD-10-CM: I95.1 ICD-9-CM: 458.0  Unknown Yes Overview Signed 5/12/2011  2:55 PM by Genoveva Cesar MD  
  Complaints of dizziness.  (lying) to 120 (standing) in 3/18/11. HTN (hypertension) ICD-10-CM: I10 
ICD-9-CM: 401.9  3/19/2011 Yes Overview Signed 5/23/2011  8:08 AM by Jack Phipps MD  
  ECHO 5/2011 = LVH, EF 50%

## 2017-04-10 NOTE — PROGRESS NOTES
Problem: Self Care Deficits Care Plan (Adult)  Goal: *Acute Goals and Plan of Care (Insert Text)  Occupational Therapy Goals  Initiated 4/10/2017  1. Patient will perform grooming standing at sink with supervision/set-up within 7 day(s). 2. Patient will perform lower body dressing with supervision/set-up within 7 day(s). 3. Patient will perform toilet transfers with supervision/set-up using best DME within 7 day(s). 4. Patient will perform all aspects of toileting with supervision/set-up within 7 day(s). 5. Patient will participate in upper extremity therapeutic exercise/activities with independence for 10 minutes within 7 day(s). 6. Patient will utilize energy conservation techniques during functional activities with verbal and visual cues within 7 day(s). OCCUPATIONAL THERAPY EVALUATION  Patient: Jas Barger (53 y.o. male)  Date: 4/10/2017  Primary Diagnosis: CHF exacerbation (HCC)  Acute exacerbation of CHF (congestive heart failure) ([facility] [address]        Precautions:  Fall      ASSESSMENT :  Based on the objective data described below, the patient presents with slow processing and motor planning, [place], decreased memory, impaired strength, endurance, mobility, balance with LOB posteriorly and safety following admission for CHF exacerbation. He has a h/o Parkinson's Disease and demonstrates bradykinesia and shuffling gait. Pt is currently at an overall max A level for LE ADLs, mod A for toileting and min A for functional mobility due to above. He resides with his daughter who works. Recommend 24/7 assist for safety and rehab at discharge if this assist is not available to pt at home. Agree with PT recommendation to follow up in an OP clinic that specializes in Parkinson's Disease. Patient will benefit from skilled intervention to address the above impairments.   Patients rehabilitation potential is considered to be Guarded  Factors which may influence rehabilitation potential include:   [ ] None noted  [X]             Mental ability/status  [X]             Medical condition  [X]             Home/family situation and support systems  [ ]             Safety awareness  [ ]             Pain tolerance/management  [ ]             Other:        PLAN :  Recommendations and Planned Interventions:  [X]               Self Care Training                  [X]        Therapeutic Activities  [X]               Functional Mobility Training    [X]        Cognitive Retraining  [X]               Therapeutic Exercises           [X]        Endurance Activities  [X]               Balance Training                   [X]        Neuromuscular Re-Education  [ ]               Visual/Perceptual Training     [X]   Home Safety Training  [X]               Patient Education                 [X]        Family Training/Education  [ ]               Other (comment):     Frequency/Duration: Patient will be followed by occupational therapy 5 times a week to address goals. Discharge Recommendations: Rehab  Further Equipment Recommendations for Discharge: TBD       SUBJECTIVE:   Patient stated I am ok.       OBJECTIVE DATA SUMMARY:   HISTORY:   Past Medical History:   Diagnosis Date    Arthritis 5/5/2010    Diabetes mellitus type 2, diet-controlled ([facility] [address]      DVT (deep venous thrombosis) ([facility] [address]       right LE DVT (mostly distal and non-occlusive) discovered 6/15    HTN (hypertension) 5/5/2010     hypertensive heart disease ([facility])    Hypercholesteremia 5/5/2010      in 4/09    Orthostatic hypotension       Complaints of dizziness.  (lying) to 120 (standing) in 3/18/11.  Parkinson disease ([facility] [address]       diagnosed around middle of may 2015   No past surgical history on file.      Prior Level of Function/Home Situation: Per pt mod I with ADLs and amb with cane  Expanded or extensive additional review of patient history: obtained from chart, pt and RN     Home Situation  Home Environment: Private residence  # Steps to Enter: 4  Rails to Enter: Yes  One/Two Story Residence: Two story, live on 1st floor  # of Interior Steps: 6  Living Alone: No  Support Systems: Child(hector) (lives with daughter)  Patient Expects to be Discharged to[de-identified] Private residence  Current DME Used/Available at Home: Cane, straight  Tub or Shower Type: Tub/Shower combination  [X]  Right hand dominant             [ ]  Left hand dominant     EXAMINATION OF PERFORMANCE DEFICITS:  Cognitive/Behavioral Status:  Neurologic State: Alert  Orientation Level: Oriented X4  Cognition: Follows commands  Perception: Cues to maintain midline in standing;Cues to maintain midline in sitting  Perseveration: No perseveration noted  Safety/Judgement: Awareness of environment; Fall prevention; Insight into deficits     Hearing: Auditory  Auditory Impairment: Hard of hearing, bilateral     Vision/Perceptual:    Acuity: Able to read clock/calendar on wall without difficulty    Corrective Lenses: Glasses     Range of Motion:  AROM: Generally decreased, functional  PROM: Generally decreased, functional                       Strength:  Strength: Generally decreased, functional                 Coordination:  Coordination: Generally decreased, functional  Fine Motor Skills-Upper: Left Impaired;Right Impaired    Gross Motor Skills-Upper: Left Intact; Right Intact     Tone & Sensation:  Tone: Normal                          Balance:  Sitting: Impaired  Sitting - Static: Fair (occasional)  Sitting - Dynamic: Fair (occasional)  Standing: Impaired  Standing - Static: Fair;Constant support  Standing - Dynamic : Poor     Functional Mobility and Transfers for ADLs:  Bed Mobility:  Supine to Sit: Supervision; Additional time  Scooting: Supervision; Additional time     Transfers:  Sit to Stand: Minimum assistance; Additional time;Assist x1 (for forward wt shift to perform sit to stand)  Stand to Sit: Minimum assistance; Additional time;Assist x1 (for forward wt shift to perform stand to sit)  Toilet Transfer : Minimum assistance; Additional time;Assist x1 (once standing using RW)     ADL Assessment and Intervention:  Feeding: Modified independent     Oral Facial Hygiene/Grooming: Contact guard assistance; Additional time;Assist x1 (standing at sink using RW)     Bathing: Moderate assistance; Additional time;Assist x1 (A to reach buttocks and feet; imp balance)     Upper Body Dressing: Setup; Additional time     Lower Body Dressing: Maximum assistance; Additional time;Assist x1 (A to reach feet and for balance)     Toileting: Moderate assistance; Additional time;Assist x1 (A for clothing and cleanliness with hygiene)     Cognitive Retraining  Safety/Judgement: Awareness of environment; Fall prevention; Insight into deficits     Functional Measure:  Barthel Index:      Bathin  Bladder: 5  Bowels: 10  Groomin  Dressin  Feeding: 10  Mobility: 0  Stairs: 0  Toilet Use: 5  Transfer (Bed to Chair and Back): 10  Total: 45         Barthel and G-code impairment scale:  Percentage of impairment CH  0% CI  1-19% CJ  20-39% CK  40-59% CL  60-79% CM  80-99% CN  100%   Barthel Score 0-100 100 99-80 79-60 59-40 20-39 1-19    0   Barthel Score 0-20 20 17-19 13-16 9-12 5-8 1-4 0      The Barthel ADL Index: Guidelines  1. The index should be used as a record of what a patient does, not as a record of what a patient could do. 2. The main aim is to establish degree of independence from any help, physical or verbal, however minor and for whatever reason. 3. The need for supervision renders the patient not independent. 4. A patient's performance should be established using the best available evidence. Asking the patient, friends/relatives and nurses are the usual sources, but direct observation and common sense are also important. However direct testing is not needed. 5. Usually the patient's performance over the preceding 24-48 hours is important, but occasionally longer periods will be relevant.   6. Middle categories imply that the patient supplies over 50 per cent of the effort. 7. Use of aids to be independent is allowed. Sheri Lux., Barthel, D.W. (9700). Functional evaluation: the Barthel Index. [address] (14)2. AWAIS Zapata Ether Ota.Arsalan., Ibeth, [address] (1999). Measuring the change indisability after inpatient rehabilitation; comparison of the responsiveness of the Barthel Index and Functional [place] Measure. Journal of Neurology, Neurosurgery, and Psychiatry, 66(4), 006-460. Tate Mayer, LIZETH, TSEPH Acevedo, & Ketty Grey M.A. (2004.) Assessment of post-stroke quality of life in cost-effectiveness studies: The usefulness of the Barthel Index and the EuroQoL-5D. Quality of Life Research, 13, 607-18         G codes: In compliance with CMSs Claims Based Outcome Reporting, the following G-code set was chosen for this patient based on their primary functional limitation being treated: The outcome measure chosen to determine the severity of the functional limitation was the Barthel Index with a score of 45/100 which was correlated with the impairment scale. · Self Care:               - CURRENT STATUS:    CK - 40%-59% impaired, limited or restricted               - GOAL STATUS:           CJ - 20%-39% impaired, limited or restricted               - D/C STATUS:                       ---------------To be determined---------------      Occupational Therapy Evaluation Charge Determination   History Examination Decision-Making   LOW Complexity : Brief history review  MEDIUM Complexity : 3-5 performance deficits relating to physical, cognitive , or psychosocial skils that result in activity limitations and / or participation restrictions MEDIUM Complexity : Patient may present with comorbidities that affect occupational performnce.  Miniml to moderate modification of tasks or assistance (eg, physical or verbal ) with assesment(s) is necessary to enable patient to complete evaluation       Based on the above components, the patient evaluation is determined to be of the following complexity level: LOW   Pain:  Pain Scale 1: Numeric (0 - 10)  Pain Intensity 1: 0              Activity Tolerance:   Fair  Please refer to the flowsheet for vital signs taken during this treatment. After treatment:   [X] Patient left in no apparent distress sitting up in chair  [ ] Patient left in no apparent distress in bed  [X] Call bell left within reach  [X] Nursing notified  [ ] Caregiver present  [X] Bed alarm activated      COMMUNICATION/EDUCATION:   The patients plan of care was discussed with: Registered Nurse and Rehabilitation Attendant.  [X] Home safety education was provided and the patient/caregiver indicated understanding. [X] Patient/family have participated as able in goal setting and plan of care. [X] Patient/family agree to work toward stated goals and plan of care. [ ] Patient understands intent and goals of therapy, but is neutral about his/her participation. [ ] Patient is unable to participate in goal setting and plan of care. This patients plan of care is appropriate for delegation to [facility] [place].      Thank you for this referral.  Carmela Babinski, OT  Time Calculation: 28 mins

## 2017-04-10 NOTE — PROGRESS NOTES
Interdisciplinary team rounds were held 4/10/2017 with the following team members:Care Management, Nursing, Nutrition and Physical Therapy and the primary RN. Plan of care discussed. See clinical pathway and/or care plan for interventions and desired outcomes.     Discharge today

## 2017-04-10 NOTE — PROGRESS NOTES
Bedside and Verbal shift change report given to Verenice Baptiste RN (oncoming nurse) by Keyanna Stone (offgoing nurse). Report included the following information SBAR, Kardex and Recent Results.

## 2017-04-11 VITALS
HEART RATE: 70 BPM | WEIGHT: 177 LBS | OXYGEN SATURATION: 100 % | BODY MASS INDEX: 29.49 KG/M2 | SYSTOLIC BLOOD PRESSURE: 138 MMHG | RESPIRATION RATE: 18 BRPM | DIASTOLIC BLOOD PRESSURE: 71 MMHG | TEMPERATURE: 97.8 F | HEIGHT: 65 IN

## 2017-04-11 PROBLEM — Z86.718 HISTORY OF DVT (DEEP VEIN THROMBOSIS): Status: ACTIVE | Noted: 2017-04-11

## 2017-04-11 LAB
ALBUMIN SERPL BCP-MCNC: 3.5 G/DL (ref 3.5–5)
ALBUMIN/GLOB SERPL: 1.1 {RATIO} (ref 1.1–2.2)
ALP SERPL-CCNC: 78 U/L (ref 45–117)
ALT SERPL-CCNC: 10 U/L (ref 12–78)
ANION GAP BLD CALC-SCNC: 7 MMOL/L (ref 5–15)
AST SERPL W P-5'-P-CCNC: 19 U/L (ref 15–37)
BILIRUB SERPL-MCNC: 0.6 MG/DL (ref 0.2–1)
BUN SERPL-MCNC: 28 MG/DL (ref 6–20)
BUN/CREAT SERPL: 20 (ref 12–20)
CALCIUM SERPL-MCNC: 8.9 MG/DL (ref 8.5–10.1)
CHLORIDE SERPL-SCNC: 100 MMOL/L (ref 97–108)
CO2 SERPL-SCNC: 34 MMOL/L (ref 21–32)
CREAT SERPL-MCNC: 1.38 MG/DL (ref 0.7–1.3)
ERYTHROCYTE [DISTWIDTH] IN BLOOD BY AUTOMATED COUNT: 14.1 % (ref 11.5–14.5)
GLOBULIN SER CALC-MCNC: 3.2 G/DL (ref 2–4)
GLUCOSE SERPL-MCNC: 97 MG/DL (ref 65–100)
HCT VFR BLD AUTO: 35.4 % (ref 36.6–50.3)
HGB BLD-MCNC: 12.1 G/DL (ref 12.1–17)
MCH RBC QN AUTO: 30.8 PG (ref 26–34)
MCHC RBC AUTO-ENTMCNC: 34.2 G/DL (ref 30–36.5)
MCV RBC AUTO: 90.1 FL (ref 80–99)
PLATELET # BLD AUTO: 181 K/UL (ref 150–400)
POTASSIUM SERPL-SCNC: 4.1 MMOL/L (ref 3.5–5.1)
PROT SERPL-MCNC: 6.7 G/DL (ref 6.4–8.2)
RBC # BLD AUTO: 3.93 M/UL (ref 4.1–5.7)
SODIUM SERPL-SCNC: 141 MMOL/L (ref 136–145)
WBC # BLD AUTO: 3.4 K/UL (ref 4.1–11.1)

## 2017-04-11 PROCEDURE — 74011250637 HC RX REV CODE- 250/637: Performed by: HOSPITALIST

## 2017-04-11 PROCEDURE — 80053 COMPREHEN METABOLIC PANEL: CPT | Performed by: FAMILY MEDICINE

## 2017-04-11 PROCEDURE — 74011250637 HC RX REV CODE- 250/637: Performed by: FAMILY MEDICINE

## 2017-04-11 PROCEDURE — 77030020186 HC BOOT HL PROTCT SAGE -B

## 2017-04-11 PROCEDURE — 97530 THERAPEUTIC ACTIVITIES: CPT | Performed by: OCCUPATIONAL THERAPIST

## 2017-04-11 PROCEDURE — 97530 THERAPEUTIC ACTIVITIES: CPT

## 2017-04-11 PROCEDURE — 97140 MANUAL THERAPY 1/> REGIONS: CPT

## 2017-04-11 PROCEDURE — 85027 COMPLETE CBC AUTOMATED: CPT | Performed by: FAMILY MEDICINE

## 2017-04-11 PROCEDURE — 97164 PT RE-EVAL EST PLAN CARE: CPT

## 2017-04-11 PROCEDURE — 36415 COLL VENOUS BLD VENIPUNCTURE: CPT | Performed by: FAMILY MEDICINE

## 2017-04-11 RX ORDER — FUROSEMIDE 40 MG/1
40 TABLET ORAL DAILY
Status: DISCONTINUED | OUTPATIENT
Start: 2017-04-12 | End: 2017-04-11 | Stop reason: HOSPADM

## 2017-04-11 RX ORDER — LOSARTAN POTASSIUM 25 MG/1
25 TABLET ORAL
Qty: 30 TAB | Refills: 0 | Status: SHIPPED | OUTPATIENT
Start: 2017-04-11 | End: 2017-04-12 | Stop reason: SDUPTHER

## 2017-04-11 RX ADMIN — APIXABAN 2.5 MG: 2.5 TABLET, FILM COATED ORAL at 09:25

## 2017-04-11 RX ADMIN — CARBIDOPA AND LEVODOPA 1 TABLET: 25; 250 TABLET ORAL at 16:04

## 2017-04-11 RX ADMIN — LABETALOL HCL 300 MG: 200 TABLET, FILM COATED ORAL at 09:25

## 2017-04-11 RX ADMIN — LOSARTAN POTASSIUM 25 MG: 25 TABLET, FILM COATED ORAL at 16:04

## 2017-04-11 RX ADMIN — APIXABAN 2.5 MG: 2.5 TABLET, FILM COATED ORAL at 17:44

## 2017-04-11 RX ADMIN — CARBIDOPA AND LEVODOPA 1 TABLET: 25; 250 TABLET ORAL at 09:25

## 2017-04-11 RX ADMIN — Medication 10 ML: at 06:18

## 2017-04-11 NOTE — PROGRESS NOTES
Samir Garcia [address] Office [phone] Fax [phone] Assessment and Plan Cornel Knox is a 80 y.o. male admitted for CHF exacerbation. He has spent 4 night(s) in the hospital. 
 
24 Hour Events: No acute events. Exacerbation of Hypertensive Heart Disease + Diastolic CHF (HFpEF) - Echo at this episode EF 55-60% with Grade 2 diastolic dysfunction. BNP is 2968. Patient is non-compliant with medication and with follow up. Unsure if patient is back to readmission weight--appears to have gained 10 lbs overnight per chart, but on physical exam excess fulid collection is not apparent. 
- Changing back to home lasix dose today--40mg PO daily. 
- Strict In's and Out's. - Daily weights, patient's average weight is 166 lbs. - Cardiac diet with low sodium and potassium - Needs rehab placement as he isn't functional enough to be safe at home alone at this time. CM aware and seeking out rehab options. Hx of DVT 
- Continue home Eliquis (prophylactically given to prevent the recurrence of a DVT).    
Hypertension; BP remains elevated this morning. Cardiology discontinued hydralazine, added cozaar. Patient was supposed to be taking cozaar in the past, but stopped for some reason. 
-Continue labetalol, cozaar. 
   
Hyperlipidemia; Lipid Panel 12/14/16 Tchol: 226 LDL: 129 HDL: 103 
-Continue Zocor qHS 
   
Parkinson's Disease - Continue home Sinemet Left Foot Wound/Ulcer - patient's family at bedside and concerned about ulcer on heel. Would like wound care to follow and treat. On my exam, this appears area of keratosis, slightly painful. Appears intact. I recommended lotion  
-Consulted wound care to see if any additional recommendations are appropriate at this time. Depression - stable. -Zoloft 
   
Constipation 
- Hold OTC Ex-lax FEN/GI - Cardiac diet. Activity - Ambulate with assistance DVT prophylaxis - Pt is on Eliquis GI prophylaxis - Not indicated at this time Admission Status - Admitted Disposition - Plan to d/c to Rehab. Code Status - Full Corrinne Fury, MD 
Family Medicine Resident Subjective / Objective Subjective: 
Symptoms:  Improved. He reports cough (complaining of an occasional cough.). No shortness of breath, malaise, chest pain, weakness or chest pressure. Diet:  Adequate intake. No nausea or vomiting. Pain:  He reports no pain. Patient again reports that he is feeling much better this morning. Temp (24hrs), Av.9 °F (36.6 °C), Min:97.7 °F (36.5 °C), Max:98 °F (36.7 °C) Objective: 
General Appearance:  Comfortable, in no acute distress and not in pain. Vital signs: (most recent): Blood pressure 144/78, pulse 70, temperature 97.8 °F (36.6 °C), resp. rate 16, height 5' 5\" (1.651 m), weight 177 lb (80.3 kg), SpO2 97 %. Vital signs are normal.  No fever. Output: Producing urine. Lungs:  Normal respiratory rate and normal effort. He is not in respiratory distress. Breath sounds clear to auscultation. No stridor. No wheezes, rhonchi or decreased breath sounds. Rales: no rales appreciated on exam this morning. (Occasionally coughing during interview.) Heart: Normal rate. Regular rhythm. S1 normal and S2 normal.  No murmur, gallop or friction rub. Extremities: There is dependent edema (1+). There is no deformity. Neurological: Patient is alert and oriented to person, place and time. Skin:  Warm and dry. (2cm area of hyperkeratosis on L heel.) Abdomen: Abdomen is soft and non-distended. Bowel sounds are normal.   There is no abdominal tenderness. Respiratory:   O2 Device: Room air I/O: 
 
Date 04/10/17 0700 - 17 8967 17 - 17 9925 Shift 8054-1750 7413-8973 24 Hour Total 1010-3173 1371-8623 24 Hour Total  
I 
N 
T 
A 
K 
E 
 [address]  290     
   P.O. 290  290 Shift Total 
(mL/kg) 290 
(3.8)  290 
(3.8) O 
U T 
P 
U Yonas Sieving  Urine (mL/kg/hr) 125 
(0.1) 875 
(1) 1000 
(0.5) Urine Voided  Urine Occurrence(s) 1 x  1 x Stool Stool Occurrence(s)  1 x 1 x Shift Total 
(mL/kg) 125 
(1.6) 875 
(11.5) 1000 
(13.1)  -644 -976 Weight (kg) 76.1 76.1 76.1 80.3 80.3 80.3 CBC: 
Recent Labs 04/11/17 
 2571  04/10/17 
 0448  04/09/17 
 3324 WBC  3.4*  2.8*  2.8* HGB  12.1  11.0*  11.5* HCT  35.4*  36.1*  36.0*  
PLT  181  171  165 Metabolic Panel: 
Recent Labs 04/11/17 
 7384  04/10/17 
 0448  04/09/17 
 0150 NA  141  140  141  
K  4.1  4.0  4.3 CL  100  102  103 CO2  34*  28  31 BUN  28*  24*  24* CREA  1.38*  1.13  1.12  
GLU  97  94  106* CA  8.9  8.6  8.7 ALB  3.5  3.3*  3.3* SGOT  19  17  19 ALT  10*  6*  <6* For Billing Chief Complaint Patient presents with  Foot Swelling Hospital Problems  Date Reviewed: 4/8/2017 Codes Class Noted POA * (Principal)Acute on chronic diastolic CHF (congestive heart failure) (HCC) ICD-10-CM: I50.33 ICD-9-CM: 428.33, 428.0  4/7/2017 Yes Diastolic dysfunction with chronic heart failure ([facility] ICD-10-CM: I50.32 
ICD-9-CM: 428.32  12/21/2015 Yes Accelerated hypertension ICD-10-CM: I10 
ICD-9-CM: 401.0  12/18/2015 Yes DVT (deep venous thrombosis) (HCC) ICD-10-CM: I82.409 ICD-9-CM: 453.40  12/2/2015 Yes Overview Signed 12/2/2015  1:41 PM by Joseph Carvajal MD  
  On Eliquis Parkinsons disease ([facility] ICD-10-CM: G20 
ICD-9-CM: 332.0  5/26/2015 Yes Lipid disorder ICD-10-CM: E78.9 ICD-9-CM: 272.9  8/16/2012 Yes Overview Signed 8/16/2012  1:36 PM by Joseph Carvajal MD  
  LDL goal < 70 Orthostatic hypotension (Chronic) ICD-10-CM: I95.1 ICD-9-CM: 458.0  Unknown Yes Overview Signed 5/12/2011  2:55 PM by Tiffanie Morris MD  
  Complaints of dizziness.  (lying) to 120 (standing) in 3/18/11.  
  
  
   
 HTN (hypertension) ICD-10-CM: I10 
ICD-9-CM: 401.9  3/19/2011 Yes Overview Signed 5/23/2011  8:08 AM by Chad Merchant MD  
  ECHO 5/2011 = LVH, EF 50%

## 2017-04-11 NOTE — PROGRESS NOTES
Problem: Mobility Impaired (Adult and Pediatric)  Goal: *Acute Goals and Plan of Care (Insert Text)  Physical Therapy Goals  Initiated 4/8/2017  1. Patient will move from supine to sit and sit to supine in bed with modified independence within 7 day(s). 2. Patient will transfer from bed to chair and chair to bed with supervision/set-up using the least restrictive device within 7 day(s). 3. Patient will perform sit to stand with supervision/set-up within 7 day(s). 4. Patient will ambulate with supervision/set-up for 100 feet with the least restrictive device within 7 day(s). 5. Patient will ascend/descend 4 stairs with 2 handrail(s) with minimal assistance/contact guard assist within 7 day(s). PHYSICAL THERAPY TREATMENT  Patient: Douglas Macias (68 y.o. male)  Date: 4/11/2017  Diagnosis: CHF exacerbation (HCC)  Acute exacerbation of CHF (congestive heart failure) (HCC) Acute on chronic diastolic CHF (congestive heart failure) ([facility]       Precautions: Fall  Chart, physical therapy assessment, plan of care and goals were reviewed. ASSESSMENT:  Pt sitting in chair on arrival of PT. It was difficult to awaken pt drifting back to sleep several times. Pt was max assist to stand. Pt stood with RW mod assist leaning to the posterior. Pt was unable to advance feet even with weight shift. Pt returned to supine. Pts vitals /56 SPO2 98% and HR 68 bpm.Nurse notified that pt is very slow and sluggish with decreased mobility today. Continue goals. Progression toward goals:  [ ]      Improving appropriately and progressing toward goals  [ ]      Improving slowly and progressing toward goals  [ ]      Not making progress toward goals and plan of care will be adjusted       PLAN:  Patient continues to benefit from skilled intervention to address the above impairments. Continue treatment per established plan of care.   Discharge Recommendations:  Rehab vs Skilled Nursing Facility  Further Equipment Recommendations for Discharge:  rolling walker       SUBJECTIVE:          OBJECTIVE DATA SUMMARY:   Critical Behavior:  Neurologic State: Alert  Orientation Level: Oriented X4  Cognition: Follows commands, Decreased command following, Decreased attention/concentration  Safety/Judgement: Awareness of environment, Fall prevention, Insight into deficits  Functional Mobility Training:  Bed Mobility:                                Transfers:  Sit to Stand: Maximum assistance  Stand to Sit: Moderate assistance                             Balance:  Sitting: Intact  Sitting - Static: Fair (occasional)  Standing: Impaired                       Pain:  Pain Scale 1: Numeric (0 - 10)  Pain Intensity 1: 0              Activity Tolerance:   Pt tolerated treatment fair  Please refer to the flowsheet for vital signs taken during this treatment.   After treatment:   [X] Patient left in no apparent distress sitting up in chair  [ ] Patient left in no apparent distress in bed  [ ] Call bell left within reach  [ ] Nursing notified  [ ] Caregiver present  [ ] Bed alarm activated      COMMUNICATION/COLLABORATION:   The patients plan of care was discussed with: Physical Therapist     Haroon Tate PTA   Time Calculation: 20 mins

## 2017-04-11 NOTE — DISCHARGE SUMMARY
[facility] FAMILY MEDICINE RESIDENCY PROGRAM  
Discharge/Transfer/Off-Service Note Date: April 11, 2017 Anjel Ervin MRN: [id number] YOB: 1935 Age: 80 y.o. Date of admission: 4/7/2017 Date of discharge/transfer: 4/11/2017 Primary Care Physician: Jacky Mayo MD  
 
Attending physician at admission: Dr. Danyel Roa Attending physician at discharge/transfer: Dr. Nitish Villa Resident physician at discharge/transfer: Adarsh Bass MD 
  
Consultants during hospitalization None. Patient was seen by Heart Failure Nurse Navigator. Admission diagnoses CHF exacerbation ([facility] Acute exacerbation of CHF (congestive heart failure) ([facility] Discharge diagnoses Hospital Problems  Date Reviewed: 4/8/2017 Codes Class Noted POA History of DVT (deep vein thrombosis) ICD-10-CM: S09.553 ICD-9-CM: V12.51  4/11/2017 Unknown * (Principal)Acute on chronic diastolic CHF (congestive heart failure) (HCC) ICD-10-CM: I50.33 ICD-9-CM: 428.33, 428.0  4/7/2017 Yes Diastolic dysfunction with chronic heart failure ([facility] ICD-10-CM: I50.32 
ICD-9-CM: 428.32  12/21/2015 Yes Accelerated hypertension ICD-10-CM: I10 
ICD-9-CM: 401.0  12/18/2015 Yes Parkinsons disease ([facility] ICD-10-CM: G20 
ICD-9-CM: 332.0  5/26/2015 Yes Lipid disorder ICD-10-CM: E78.9 ICD-9-CM: 272.9  8/16/2012 Yes Overview Signed 8/16/2012  1:36 PM by Jacky Mayo MD  
  LDL goal < 70 Orthostatic hypotension (Chronic) ICD-10-CM: I95.1 ICD-9-CM: 458.0  Unknown Yes Overview Signed 5/12/2011  2:55 PM by Greg Anderson MD  
  Complaints of dizziness.  (lying) to 120 (standing) in 3/18/11. HTN (hypertension) ICD-10-CM: I10 
ICD-9-CM: 401.9  3/19/2011 Yes Overview Signed 5/23/2011  8:08 AM by Arlene Rdz MD  
  ECHO 5/2011 = LVH, EF 50% History of Present Illness Per Dr. Yanet Al is a 80 y.o. male with Hx of DD-CHF, HTN, hypercholesteremia, parkinson disease, DM type II, DVT, and orthostatic hypotension who presents to the ER complaining of leg and foot swelling for a week. Most of the Hx is given by his daughter. Since a week ago, patient have been experiencing increased leg swelling along with fatigue, dry cough, and decrease in urination. He have noted that he have needed to use more pillows to sleep, and that his fatigue and cough is better while sit up. Daughter remarks that he have been having some difficulties at answering questions and following commands for about 1 month. She also states that the patient used to have 2 \"water pills\" but 1 of them was discontinued by PCP recently for intolerance to the medication. Denies CP, SOB, abdominal pain, falls, or any other [complaints] at this moment. \" Hospital course Exacerbation of Hypertensive Heart Disease + Diastolic CHF (HFpEF) - Echo at this episode EF 55-60% with Grade 2 diastolic dysfunction (unchanged from 12/2015). BNP was 2968 on admission. Patient is non-compliant with medication and with follow up. Was treated with BID lasix therapy. Weights poorly charted during hospitalization. Unsure if patient back to readmission weight. Symptoms are under much better control--no longer with cough, sob. LE swelling much improved. - 40mg PO lasix daily. 
- Daily weights, patient's average weight is 166 lbs. - Cardiac diet with low sodium and potassium. 
- Follow up with cardiologist (Dr. Thanh Finn) following discharge from rehab. 
  
Hx of DVT 
- Continue home Eliquis (prophylactically given to prevent the recurrence of a DVT).    
Hypertension; BP remained elevated during hospitalization. Cardiology discontinued hydralazine, added cozaar. Patient was supposed to be taking cozaar in the past, but stopped for some reason. - Continue labetalol, cozaar. 
- Discontinue hydralazine. 
   
Hyperlipidemia;  Lipid Panel 12/14/16 Tchol: 226 LDL: 129 HDL: 103 
- Continue Zocor qHS.    
Parkinson's Disease - Continue home Sinemet. 
  
Left Foot Eschar - patient's family at bedside and concerned about ulcer on heel on day of discharge. Would like wound care to follow and treat. Appears area of keratosis, slightly painful. Appears intact. I recommended moisturizing lotion and good fitting socks. - Consulted wound care to see if any additional recommendations are appropriate at this time. - Patient can be followed by wound care nurse at rehab facility. 
  
Depression - stable. - Zoloft 
   
Constipation 
- Held OTC Ex-lax, can use this at rehab if needed. Physical exam at discharge: See daily progress note. Condition at discharge: stable. Labs Recent Labs 04/11/17 
 0962  04/10/17 
 0448  04/09/17 
 7520 WBC  3.4*  2.8*  2.8* HGB  12.1  11.0*  11.5* HCT  35.4*  36.1*  36.0*  
PLT  181  171  165 Recent Labs 04/11/17 
 2793  04/10/17 
 0448  04/09/17 
 2500 NA  141  140  141  
K  4.1  4.0  4.3 CL  100  102  103 CO2  34*  28  31 BUN  28*  24*  24* CREA  1.38*  1.13  1.12  
GLU  97  94  106* CA  8.9  8.6  8.7 Recent Labs 04/11/17 
 5205  04/10/17 
 0448  04/09/17 
 4562 SGOT  19  17  19 ALT  10*  6*  <6* AP  78  70  70 TBILI  0.6  0.6  0.6 TP  6.7  6.5  6.5 ALB  3.5  3.3*  3.3*  
GLOB  3.2  3.2  3.2 No results for input(s): INR, PTP, APTT in the last 72 hours. No lab exists for component: INREXT, INREXT No results for input(s): FE, TIBC, PSAT, FERR in the last 72 hours. No results for input(s): PH, PCO2, PO2 in the last 72 hours. No results for input(s): CPK, CKMB in the last 72 hours. No lab exists for component: TROPONINI Lab Results Component Value Date/Time  Glucose (POC) 119 06/05/2015 08:48 PM  
 Glucose (POC) 136 04/11/2014 04:41 PM  
 Glucose (POC) 117 06/20/2010 04:29 PM  
 Glucose (POC) 111 08/22/2009 01:26 PM  
 Glucose (POC) 119 05/03/2009 09:40 AM  
 Glucose  09/29/2014 11:26 AM  
 Glucose POC 78 06/24/2014 11:24 AM  
 
  
Diagnostic studies PA/LAT CXR on 4/7/17 which showed The heart size is normal. There is no acute infiltrate. TTE on 4/8/17 which showed EF of 55-60% with no wma. Moderately increased wall thickness. Grade 2 diastolic dysfunction. Procedures None. Disposition:  Discharge to acute rehab facility. Discharge/Transfer Medications Current Discharge Medication List  
  
START taking these medications Details  
losartan (COZAAR) 25 mg tablet Take 1 Tab by mouth daily (with dinner). Qty: 30 Tab, Refills: 0 CONTINUE these medications which have NOT CHANGED Details  
potassium chloride SR (KLOR-CON 10) 10 mEq tablet Take 10 mEq by mouth two (2) times a day. sertraline (ZOLOFT) 50 mg tablet Take 50 mg by mouth nightly. therapeutic multivitamin (THERAGRAN) tablet Take 1 Tab by mouth daily. simvastatin (ZOCOR) 40 mg tablet Take 1 Tab by mouth nightly. For cholesterol 
Qty: 90 Tab, Refills: 3 Associated Diagnoses: Lipid disorder  
  
carbidopa-levodopa (SINEMET)  mg per tablet Take 1 Tab by mouth three (3) times daily. Qty: 90 Tab, Refills: 2 Associated Diagnoses: Parkinsons disease ([address] furosemide (LASIX) 40 mg tablet Take 1 Tab by mouth daily. Qty: 30 Tab, Refills: 12  
 Associated Diagnoses: Hypokalemia; Lipid disorder; Essential hypertension  
  
labetalol (NORMODYNE) 300 mg tablet Take 1 Tab by mouth two (2) times a day. Qty: 60 Tab, Refills: 12  
 Associated Diagnoses: Accelerated hypertension; Diastolic dysfunction with chronic heart failure ([address] Essential hypertension; Orthostatic hypotension  
  
apixaban (ELIQUIS) 2.5 mg tablet TAKE ONE TABLET BY MOUTH TWICE DAILY Qty: 60 Tab, Refills: 12 Recommended follow-up tests after discharge · Per cardiology. Diet:  Cardiac (low sodium) diet Activity:  As tolerated Discharge instructions to patient/family Please seek medical attention for any new or worsening symptoms particularly fever, chest pain, shortness of breath, abdominal pain, nausea, vomiting Follow up plans/appointments Follow-up Information Follow up With Details Comments Contact Info Ayesha Ramires MD In 1 month Cardiologist follow up. [address] [facility] [address] [facility] 
[phone] Warren Hammonds MD  Following discharge from rehab. [address] [place] [id number] [address] 
[phone] Laurence Up MD 
Family Medicine Resident Cc: Warren Hammonds MD

## 2017-04-11 NOTE — WOUND CARE
Wound care consult per attending MD to assess the left posterior heel and lower leg edema. Alert, no distress, sitting in the chair. Plan of care and assessment discussed with staff nurse. Assessment  Lower legs below the knees to feet are edematous, skin is taut, intact. Tender to touch. Bilateral feet are dry and scaling, tender left posterior heel. LEFT posterior heel- ~3x3 cm dry black stable resolving eschar, edges intact and dry, patient unable to verbalize history of wound, unstageable pressure injury present on admission, difficult to visualize due to skin color and dry ness, more prominent after cleansing. Treatment  Legs elevated Heels off loaded, betadine to heel. Advised patient of plan of care, unable to determine understanding. Plan of care and orders obtained by attending MD.  Will follow.   Fabian Mcmillan

## 2017-04-11 NOTE — PROGRESS NOTES
Occupational Therapy Note:  Chart reviewed and pt cleared by RN for OT. Upon arrival pt seated up in chair sleeping. He easily aroused to voice and started conversing with OT. OT removed blanket from pt's lap for pt to participate in LE ADLs and pt closed eyes and appeared to fall back to sleep. He opened his eyes to voice and touch, but only spoke in single words and head nods and then closed eyes . Vital signs taken and stable and RN notified immediately. Will follow up with OT at later time as able.   Russell Cogan Smith, OTR/L, CBIS

## 2017-04-11 NOTE — NURSE NAVIGATOR
Chart reviewed by Heart Failure Nurse Navigator. Heart Failure database completed. Current Echo shows EF 55-60% with wall thickness moderately increased, grade 2 DD. Unchanged from previous Echo. ACEi/ARB: Losartan 25 mg PO daily. BB: Labetolol 300 mg PO BID. CRT not indicated. NYHA Functional Class III  symptoms on admission with increased edema, fatigue, weakness, and dry cough along with decreased urine output for 1 week PTA. Patient has sedentary lifestyle at baseline and PT/OT recommending rehab. Heart Failure Teach Back in Patient Education. Heart Failure Avoiding Triggers on Discharge Instructions. Usual cardiologist: Dr Zeb Clark. Dr Jose Luis Guardado has sent him a message notifying him of admission. Met with patient at bedside who is sitting up in chair and introduced self and role of HF NN. No family in room. Patient states he lives with his daughter who takes him to MD appts and manages his medications. Patient states he is feeling much better than when he came in. His speech and response to questions are slow but appropriate. Patient gives permission for me to call his daughter. Heart Failure Education folder left at bedside. 4/11/17 1615: Met with patient and daughter at bedside. Introduced self and role of HF NN.  F/U appt with [facility] has been scheduled and appt info given to daughter. Patient discharging this afternoon to [facility]. Heart Failure Education Folder provided. Reviewed low salt diet, daily weights and signs and symptoms of heart failure. Reviewed Heart Failure Zones and when to call the physician.

## 2017-04-11 NOTE — PROGRESS NOTES
LYMPHEDEMA Therapy EVALUATION    Patient: Stevie Jo (42 y.o. male)  Date: 4/11/2017  Primary Diagnosis: CHF exacerbation ([facility]  Acute exacerbation of CHF (congestive heart failure) ([facility]        Precautions:   Fall    ASSESSMENT :  Based on the objective data described below, the patient presents with +2 pitting edema from knee distal to toes due to CHF exacerbation. Patient with no former compression garments. He is not in an acute exacerbation of his symptoms, he is now stabilized and MD order for LE compression to address his LE swelling. Due to his transfer to rehab soon, we proceeded with a temporary compression system using Size G tubi . Short stretch bandages not applied due to no carry over once at the rehab facility. Long term for adequate management of his LE swelling his would benefit from custom compression garments. He will need caregiver assistance due to his inability to reach his LE. Patient tolerated application of knee high tubi  without complications. Patient instructed in all precautions and educated nursing staff as well. Handout on patient's communication board for communication with all staff and reminder for the patient. Patient will benefit from 2-3x/week PT sessions for management of his swelling. Patient will benefit from skilled intervention to address the above impairments.   Patients rehabilitation potential is considered to be Fair  Factors which may influence rehabilitation potential include:   []           None noted  [x]           Mental ability/status  []           Medical condition  []           Home/family situation and support systems  []           Safety awareness  []           Pain tolerance/management  []           Other:      PLAN :  Recommendations and Planned Interventions:  Manual lymph drainage/complete decongestive therapy  Multi-layer compression bandaging (short-stretch)  Compression garment fitting/provision  Lymphedema therapeutic exercise  AROM/PROM/Strength/Coordination  Self-care training  Functional mobility training  Education in skin care and lymphedema precautions  Self-MLD education per home program  Self-bandaging education per home program  Caregiver education as needed  Wound care as needed  Frequency/Duration: Patient will be followed by physical therapy 2 times a week to address goals. Discharge Recommendations: outpatient Lymphedema  Further Equipment Recommendations for Discharge:      HISTORY AND BACKGROUND:     Past Medical History:   Diagnosis Date    Arthritis 5/5/2010    Diabetes mellitus type 2, diet-controlled ([facility]     DVT (deep venous thrombosis) ([facility]     right LE DVT (mostly distal and non-occlusive) discovered 6/15    HTN (hypertension) 5/5/2010    hypertensive heart disease (LVH)    Hypercholesteremia 5/5/2010     in 4/09    Orthostatic hypotension     Complaints of dizziness.  (lying) to 120 (standing) in 3/18/11.  Parkinson disease ([facility]     diagnosed around middle of may 2015   No past surgical history on file.   Patient Active Problem List   Diagnosis Code    H/O: GI bleed Z87.19    TIA (transient ischemic attack) G45.9    HTN (hypertension) I10    Hypotension, postural I95.1    ED (erectile dysfunction) N52.9    RAD (reactive airway disease) J45.909    Abnormal EKG R94.31    Cardiac dysrhythmia, unspecified I49.9    Orthostatic hypotension I95.1    Lipid disorder E78.9    Diabetes mellitus type II, controlled ([facility] E11.9    DJD (degenerative joint disease), lumbar M47.816    Chronic lumbar pain M54.5, G89.29    Asthma, mild intermittent J45.20    Parkinsons disease ([facility] G20    Situational anxiety F41.8    BPH (benign prostatic hypertrophy) N40.0    Accelerated hypertension I10    Mild anemia I32.5    Diastolic dysfunction with chronic heart failure (HCC) I50.32    Acute on chronic diastolic CHF (congestive heart failure) (HCC) I50.33    History of DVT (deep vein thrombosis) Z86.718     Lymphedema Diagnosis: Stage II bilatearl LE   Date of Onset: unknown, current exacerbation  Present Symptoms and Functional Limitations:   Prior Level of Function:  Home Situation  Home Environment: Private residence  # Steps to Enter: 4  Rails to Enter: Yes  One/Two Story Residence: Two story, live on 1st floor  # of Interior Steps: 6  Living Alone: No  Support Systems: Child(hector) (lives with daughter)  Patient Expects to be Discharged to[de-identified] Private residence  Current DME Used/Available at Home: Cane, straight  Tub or Shower Type: Tub/Shower combination  Personal factors and/or comorbidities impacting plan of care:   Previous Therapy:  none  Compression/Lymphedema Equipment:  none    SUBJECTIVE:   Patient stated \"that looks good. \"    OBJECTIVE DATA SUMMARY:   EXAMINATION/PRESENTATION/DECISION MAKING:   Pain:  Pain Scale 1: Numeric (0 - 10)  Pain Intensity 1: 0              Skin and Tissue Assessment:  All exposed intact, shiny appearance without hair. No wounds or open areas  Stemmers Sign: Positive  Height:  Height: 5' 5\" (165.1 cm)  Weight:  Weight: 80.3 kg (177 lb)   BMI:  BMI (calculated): 29.5  (36 or greater: adversely affecting lymphedema)  Wound/Ulcer:  none  Sensation:  intact  Mobility:   (See physical therapy notes related to mobility as applicable. Circumferential Measurements:   Affected Side: Bilateral   Left (cm) Right (cm)   5th Tuberosity 27.5    27.5      Ankle 29    29      Calf 38    39      Mid Knee 40    39.5      Thigh             Groin             Length                   Precautions:  Standard lymphedema precautions to include avoiding blood pressure readings, injections and IVs or other procedures/acts that could lead to broken skin on affected area, and avoiding excessive heat, resistive activity or altitude without compression garment         TREATMENT PROVIDED:   Bilateral LE cleanses with warm water and mild pH soap. Pat dry, and lotion applied.   Size G tubi  applied from toes to knee. Adequate fit noted, no wrinkles or lines. Patient without adverse symptoms with application. Patient educated on signs and symptoms of infection. Instructions and education for tubi  left on patient's communication board. GOALS  1. Patient will tolerate tubigrip application to bilateral LE without adverse events within 7 days. 2.  Patient will demonstrate a 5% reduction in swelling as noted in circumferential measurements within 7 days. COMMUNICATION/EDUCATION:   The patients plan of care was discussed with: Registered Nurse. [x]           Fall prevention education was provided and the patient/caregiver indicated understanding. [x]           Patient/family have participated as able in goal setting and plan of care. [x]           Patient/family agree to work toward stated goals and plan of care. []           Patient understands intent and goals of therapy, but is neutral about his/her participation. []           Patient is unable to participate in goal setting and plan of care.     Thank you for this referral.  Sebastian Kaufman, PT, DPT   Time Calculation: 20 mins

## 2017-04-11 NOTE — ROUTINE PROCESS
Report called to [address] at [id number] [facility]. 1702: Discharge instructions reviewed withPatient. Patient and Family member verbalized understanding of instructions. Opportunities for questions were provided and explained. Patient discharged SNF. Discharge medications reviewed with patient and caregiver and appropriate educational materials and side effects teaching were provided. Patient signed paper copy due to technical error.

## 2017-04-11 NOTE — DISCHARGE INSTRUCTIONS
REHAB DISCHARGE INSTRUCTIONS    Toshia Singh / [id number] : 1935    Admission date: 2017 Discharge date: 2017       Primary care provider: Nery Velásquez MD    Discharging provider:  Emma Briones MD  - Family Medicine Resident  Nguyen Brunner MD - Attending, Family Medicine   . . . . . . . . . . . . . . . . . . . . . . . . . . . . . . . . . . . . . . . . . . . . . . . . . . . . . . . . . . . . . . . . . . . . . . . Sheryl Elida FINAL DIAGNOSES & HOSPITAL COURSE:    Exacerbation of Hypertensive Heart Disease + Diastolic CHF (HFpEF) - Echo at this episode EF 55-60% with Grade 2 diastolic dysfunction (unchanged from 2015). BNP was 2968 on admission. Patient is non-compliant with medication and with follow up. Was treated with BID lasix therapy. Weights poorly charted during hospitalization. Unsure if patient back to readmission weight. Symptoms are under much better control--no longer with cough, sob. LE swelling much improved. - 40mg PO lasix daily.  - Daily weights, patient's average weight is 166 lbs. - Cardiac diet with low sodium and potassium.  - Follow up with cardiologist (Dr. Waynette Oppenheim) following discharge from rehab.     Hx of DVT  - Continue home Eliquis (prophylactically given to prevent the recurrence of a DVT).     Hypertension; BP remained elevated during hospitalization. Cardiology discontinued hydralazine, added cozaar. Patient was supposed to be taking cozaar in the past, but stopped for some reason. - Continue labetalol, cozaar.  - Discontinue hydralazine.      Hyperlipidemia; Lipid Panel 16 Tchol: 226 LDL: 129 HDL: 103  - Continue Zocor qHS.     Parkinson's Disease  - Continue home Sinemet.     Left Foot Eschar - patient's family at bedside and concerned about ulcer on heel on day of discharge. Would like wound care to follow and treat. Appears area of keratosis, slightly painful. Appears intact. I recommended moisturizing lotion and good fitting socks.    - Consulted wound care to see if any additional recommendations are appropriate at this time. - Patient can be followed by wound care nurse at rehab facility.     Depression - stable. - Zoloft      Constipation  - Held OTC Ex-lax, can use this at rehab if needed. FOLLOW-UP CARE RECOMMENDATIONS:  Follow-up Information     Follow up With Details Comments Hayde Blackwell MD In 1 month Cardiologist follow up. [address] [facility]  [address] [place] Janeth White MD  Following discharge from rehab. [address] [place] [address]  [phone]            It is very important that you keep follow-up appointment(s). Bring discharge papers, medication list (and/or medication bottles) to follow-up appointments for review by outpatient provider(s). FOLLOW-UP TESTS RECOMMENDED:   · Per cardiology. ONGOING TREATMENT PLAN: See hospital course above. PENDING TEST RESULTS:  At the time of discharge the following test results are still pending: None. Please review these results as they become available. Specific symptoms to watch for: chest pain, shortness of breath, fever, chills, nausea, vomiting, diarrhea, change in mentation, falling, weakness, bleeding. DIET:  Cardiac Diet (low sodium)    ACTIVITY:  PT/OT Eval and Treat    WOUND CARE: Wound care eval and treat (left heel wound/ulcer). GOALS OF CARE:  x  Eventual return to home/independent/assisted living     Long term SNF      Hospice     No rehospitalization     Patient condition at discharge:   Functional status    Poor    x  Deconditioned      Independent   Cognition  x  Lucid     Forgetful (some sensescence)     Dementia   Catheters/lines (plus indication)    Sanders     PICC      PEG    x  None   Code status    Full code    x  DNR    . . . . . . . . . . . . . . . . . . . . . . . . . . . . . . . . . . . . . . . . . . . . . . . . . . . . . . . . . . . . . . . . . . . . . . . Lito Ramos CHRONIC MEDICAL CONDITIONS:  Problem List as of 4/11/2017  Date Reviewed: 4/8/2017          Codes Class Noted - Resolved    History of DVT (deep vein thrombosis) ICD-10-CM: Z86.718  ICD-9-CM: V12.51  4/11/2017 - Present        * (Principal)Acute on chronic diastolic CHF (congestive heart failure) ([facility]) ICD-10-CM: I50.33  ICD-9-CM: 428.33, 428.0  4/7/2017 - Present        Diastolic dysfunction with chronic heart failure ([facility] ICD-10-CM: I50.32  ICD-9-CM: 428.32  12/21/2015 - Present        Mild anemia ICD-10-CM: D64.9  ICD-9-CM: 285.9  12/20/2015 - Present        Accelerated hypertension ICD-10-CM: I10  ICD-9-CM: 401.0  12/18/2015 - Present        Situational anxiety ICD-10-CM: F41.8  ICD-9-CM: 300.09  6/17/2015 - Present        BPH (benign prostatic hypertrophy) ICD-10-CM: N40.0  ICD-9-CM: 600.00  6/17/2015 - Present        Parkinsons disease ([facility] ICD-10-CM: Scharlene Galla  ICD-9-CM: 332.0  5/26/2015 - Present        Asthma, mild intermittent ICD-10-CM: J45.20  ICD-9-CM: 493.90  3/17/2015 - Present        Chronic lumbar pain ICD-10-CM: M54.5, G89.29  ICD-9-CM: 724.2, 338.29  3/11/2015 - Present        DJD (degenerative joint disease), lumbar ICD-10-CM: M47.816  ICD-9-CM: 721.3  2/6/2015 - Present        Diabetes mellitus type II, controlled ([facility] ICD-10-CM: E11.9  ICD-9-CM: 250.00  1/26/2015 - Present        Lipid disorder ICD-10-CM: E78.9  ICD-9-CM: 272.9  8/16/2012 - Present    Overview Signed 8/16/2012  1:36 PM by Edilia Soto MD     LDL goal < 70             Abnormal EKG ICD-10-CM: R94.31  ICD-9-CM: 794.31  5/12/2011 - Present    Overview Signed 7/6/2011  5:47 PM by Vernon Mahoney MD     Echo 2d adult   5/19/11   mild-mod LVH, EF 50-55% (low normal).  Mod LAE, mild MR.      Nm cardiac spect w stress / rest mult   5/19/11   no inducible ischemia; LVEF 45%              Cardiac dysrhythmia, unspecified ICD-10-CM: I49.9  ICD-9-CM: 427.9  5/12/2011 - Present        Orthostatic hypotension (Chronic) ICD-10-CM: I95.1  ICD-9-CM: 458.0  Unknown - Present    Overview Signed 5/12/2011  2:55 PM by Bree Aguilar MD     Complaints of dizziness.  (lying) to 120 (standing) in 3/18/11. HTN (hypertension) ICD-10-CM: I10  ICD-9-CM: 401.9  3/19/2011 - Present    Overview Signed 5/23/2011  8:08 AM by Kermit May MD     ECHO 5/2011 = LVH, EF 50%             Hypotension, postural ICD-10-CM: I95.1  ICD-9-CM: 458.0  3/19/2011 - Present    Overview Signed 7/6/2011  5:47 PM by Kermit May MD     Echo 2d adult   5/19/11   mild-mod LVH, EF 50-55% (low normal).  Mod LAE, mild MR.      Nm cardiac spect w stress / rest mult   5/19/11   no inducible ischemia; LVEF 45%              ED (erectile dysfunction) ICD-10-CM: N52.9  ICD-9-CM: 607.84  3/19/2011 - Present        RAD (reactive airway disease) ICD-10-CM: J45.909  ICD-9-CM: 493.90  3/19/2011 - Present        H/O: GI bleed ICD-10-CM: Z87.19  ICD-9-CM: V12.79  5/5/2010 - Present        TIA (transient ischemic attack) ICD-10-CM: G45.9  ICD-9-CM: 435.9  5/5/2010 - Present        RESOLVED: CHF exacerbation ([facility] [address] ICD-10-CM: I50.9  ICD-9-CM: 428.0  4/7/2017 - 4/8/2017        RESOLVED: Protein in urine ICD-10-CM: R80.9  ICD-9-CM: 791.0  5/28/2015 - 1/11/2016        RESOLVED: Hyperglycemia ICD-10-CM: R73.9  ICD-9-CM: 790.29  9/6/2012 - 3/11/2015    Overview Addendum 7/17/2014  5:13 PM by Chuck Ingram MD     a1c 6.8 7/2014  Need to repeat to confirm T2DM             RESOLVED: Weight loss, unintentional ICD-10-CM: R63.4  ICD-9-CM: 783.21  7/6/2011 - 3/11/2015    Overview Signed 7/6/2011  5:45 PM by Kermit May MD     Saw nutritionist 6/2011 at [facility]             RESOLVED: Grief ICD-10-CM: F43.20  ICD-9-CM: 309.0  6/3/2011 - 3/11/2015    Overview Signed 6/3/2011 11:06 AM by Kermit May MD     Lost wife in 2011             RESOLVED: Prostatism ICD-10-CM: N40.0  ICD-9-CM: 600.90  6/3/2011 - 12/2/2015        RESOLVED: Arthritis ICD-10-CM: M19.90  ICD-9-CM: 716.90  3/19/2011 - 12/2/2015              Information obtained by :   I understand that if any problems occur once I am at home I am to contact my physician. I understand and acknowledge receipt of the instructions indicated above. Physician's or R.N.'s Signature                                                                  Date/Time                                                                                                                                              Patient or Representative Signature                                                          Date/Time        Avoiding Triggers with Congestive Heart Failure (CHF):   Your Care Instructions   Triggers are anything that make your heart failure flare up. A flare-up is also called \"sudden heart failure\" or \"acute heart failure. \" When you have a flare-up, fluid builds up in your lungs, and you have problems breathing. You might need to go to the hospital. By watching for changes in your condition and avoiding triggers, you can prevent heart failure flare-ups. Follow-up care is a key part of your treatment and safety. Be sure to make and go to all appointments, and call your doctor if you are having problems. It's also a good idea to know your test results and keep a list of the medicines you take. How can you care for yourself at home? Watch for changes in your weight and condition  · Weigh yourself without clothing at the same time each day. Record your weight. Call your doctor if you gain 3 pounds or more in 24 hrs or 5 pounds in one week. A sudden weight gain may mean that your heart failure is getting worse. · Keep a daily record of your symptoms. Write down any changes in how you feel, such as new shortness of breath, cough, or problems eating.  Also record if your ankles are more swollen than usual and if you have to urinate in the night more often. Note anything that you ate or did that could have triggered these changes. Limit sodium  Sodium causes your body to hold on to water, making it harder for your heart to pump. People get most of their sodium from processed foods. Fast food and restaurant meals also tend to be very high in sodium. · Your doctor may suggest that you limit sodium to 1,500 milligrams (mg) a day. That is less than 1 teaspoon of salt a day, including all the salt you eat in cooking or in packaged foods. · Read food labels on cans and food packages. They tell you how much sodium you get in one serving. Check the serving size. If you eat more than one serving, you are getting more sodium. · Be aware that sodium can come in forms other than salt, including monosodium glutamate (MSG), sodium citrate, and sodium bicarbonate (baking soda). MSG is often added to Asian food. You can sometimes ask for food without MSG or salt. · Slowly reducing salt will help you adjust to the taste. Take the salt shaker off the table. · Flavor your food with garlic, lemon juice, onion, vinegar, herbs, and spices instead of salt. Do not use soy sauce, steak sauce, onion salt, garlic salt, mustard, or ketchup on your food, unless it is labeled \"low-sodium\" or \"low-salt. \"  · Make your own salad dressings, sauces, and ketchup without adding salt. · Use fresh or frozen ingredients, instead of canned ones, whenever you can. Choose low-sodium canned goods. · Eat less processed food and food from restaurants, including fast food. Exercise as directed  Moderate, regular exercise is very good for your heart. It improves your blood flow and helps control your weight. But too much exercise can stress your heart and cause a heart failure flare-up. · Check with your doctor before you start an exercise program.  · Walking is an easy way to get exercise. Start out slowly. Gradually increase the length and pace of your walk.  Swimming, riding a bike, and using a treadmill are also good forms of exercise. · When you exercise, watch for signs that your heart is working too hard. You are pushing yourself too hard if you cannot talk while you are exercising. If you become short of breath or dizzy or have chest pain, stop, sit down, and rest.  · Do not exercise when you do not feel well. Take medicines correctly  · Take your medicines exactly as prescribed. Call your doctor if you think you are having a problem with your medicine. · Make a list of all the medicines you take. Include those prescribed to you by other doctors and any over-the-counter medicines, vitamins, or supplements you take. Take this list with you when you go to any doctor. · Take your medicines at the same time every day. It may help you to post a list of all the medicines you take every day and what time of day you take them. · Make taking your medicine as simple as you can. Plan times to take your medicines when you are doing other things, such as eating a meal or getting ready for bed. This will make it easier to remember to take your medicines. · Get organized. Use helpful tools, such as daily or weekly pill containers. When should you call for help? Call 911 if you have symptoms of sudden heart failure such as:  · You have severe trouble breathing. · You cough up pink, foamy mucus. · You have a new irregular or rapid heartbeat. Call your doctor now or seek immediate medical care if:  · You have new or increased shortness of breath. · You are dizzy or lightheaded, or you feel like you may faint. · You have sudden weight gain, such as 3 pounds in 24 hours, or 5 pounds in one week. · You have increased swelling in your legs, ankles, or feet. · You are suddenly so tired or weak that you cannot do your usual activities. Watch closely for changes in your health, and be sure to contact your doctor if you develop new symptoms. Where can you learn more?   Go to http://silver-jesus.info/  Enter [id number] in the search box to learn more about \"Avoiding Triggers With Heart Failure: Care Instructions. \"  © 9562-9088 [employer]. Care instructions adapted under license by [employer] (which disclaims liability or warranty for this information). This care instruction is for use with your licensed healthcare professional. If you have questions about a medical condition or this instruction, always ask your healthcare professional. [place] [address] any warranty or liability for your use of this information. Content Version: 40.4.463708; Current as of: January 27, 2016 (modified 10/10/16).

## 2017-04-12 ENCOUNTER — PATIENT OUTREACH (OUTPATIENT)
Dept: FAMILY MEDICINE CLINIC | Age: 82
End: 2017-04-12

## 2017-04-12 RX ORDER — LOSARTAN POTASSIUM 25 MG/1
25 TABLET ORAL
Qty: 30 TAB | Refills: 2 | Status: SHIPPED | OUTPATIENT
Start: 2017-04-12 | End: 2017-04-12 | Stop reason: SDUPTHER

## 2017-04-12 RX ORDER — LOSARTAN POTASSIUM 50 MG/1
TABLET ORAL
Qty: 30 TAB | Refills: 0 | Status: SHIPPED | OUTPATIENT
Start: 2017-04-12 | End: 2017-05-02 | Stop reason: SDUPTHER

## 2017-04-12 NOTE — PROGRESS NOTES
NNTOCIP (Transitions of Care Inpatient) Hospital Discharge Follow-Up Date/Time:     4/12/2017 3:45 PM 
 
Patient listed on discharge [facility]) report on 4/11/17. Patient discharged from [address] [place] [address] for Acute CHF. RRAT score: High Risk   
      
 22 Total Score 3 Relationship with PCP  
 4 More than 1 Admission in calendar year 15 Charlson Comorbidity Score Criteria that do not apply:  
 Patient Living Status Patient Length of Stay > 5 Patient Insurance is Medicare, Medicaid or Self Pay Medical History:    
Past Medical History:  
Diagnosis Date  Arthritis 5/5/2010  Diabetes mellitus type 2, diet-controlled ([facility]  DVT (deep venous thrombosis) ([facility] right LE DVT (mostly distal and non-occlusive) discovered 6/15  
 HTN (hypertension) 5/5/2010  
 hypertensive heart disease (LVH)  Hypercholesteremia 5/5/2010  in 4/09  Orthostatic hypotension Complaints of dizziness.  (lying) to 120 (standing) in 3/18/11.  Parkinson disease ([facility] diagnosed around middle of may 2015 Nurse Navigator(NN) contacted the patient by telephone to perform post hospital discharge assessment but was unable to reach the patient. The patient was transferred to the [id number] [place] [address]. Outreach was made to the [place] and a VM was left for the  to return a call to this office to give patient status updates and to inform this office of discharge plans. Labs Reviewed: 
Recent Labs 04/11/17 
 0557  04/10/17 
 0448 WBC  3.4*  2.8* HGB  12.1  11.0*  
HCT  35.4*  36.1*  
PLT  181  171 Recent Labs 04/11/17 
 0557  04/10/17 
 0448 NA  141  140  
K  4.1  4.0  
CL  100  102 CO2  34*  28  
GLU  97  94 BUN  28*  24* CREA  1.38*  1.13  
CA  8.9  8.6 ALB  3.5  3.3* SGOT  19  17 ALT  10*  6* Follow up [facility] appt is with Amanda Hunt on 5/1214 at 9:30 am.

## 2017-04-12 NOTE — TELEPHONE ENCOUNTER
Requested Prescriptions     Signed Prescriptions Disp Refills    losartan (COZAAR) 25 mg tablet 30 Tab 2     Sig: Take 1 Tab by mouth daily (with dinner).      Authorizing Provider: Frederich Sicard     Ordering User: Dede Treadwell

## 2017-04-28 ENCOUNTER — TELEPHONE (OUTPATIENT)
Dept: FAMILY MEDICINE CLINIC | Age: 82
End: 2017-04-28

## 2017-04-28 ENCOUNTER — HOME HEALTH ADMISSION (OUTPATIENT)
Dept: HOME HEALTH SERVICES | Facility: HOME HEALTH | Age: 82
End: 2017-04-28

## 2017-04-28 NOTE — TELEPHONE ENCOUNTER
Angelique Alfonso [facility] - INPATIENT, ph [phone], called to say patient is being discharged today from rehab at the [address] [place] [address]. Called to ask if Dr. Aranza Dunham will follow the patient for  PT and OT.     Aaliyah Ma, nurse navigator, spoke with her and gave \"Yes\"

## 2017-05-01 ENCOUNTER — PATIENT OUTREACH (OUTPATIENT)
Dept: FAMILY MEDICINE CLINIC | Age: 82
End: 2017-05-01

## 2017-05-01 DIAGNOSIS — R53.81 DEBILITY: Primary | ICD-10-CM

## 2017-05-01 NOTE — PROGRESS NOTES
NNTOCIP (Transitions of Care) Hospital Discharge Follow-Up Date/Time:     5/1/2017 11:58 AM 
 
Patient discharged from The [address] [facility]. RRAT score: High Risk   
      
 22 Total Score 3 Relationship with PCP  
 4 More than 1 Admission in calendar year 15 Charlson Comorbidity Score Criteria that do not apply:  
 Patient Living Status Patient Length of Stay > 5 Patient Insurance is Medicare, Medicaid or Self Pay Medical History:    
Past Medical History:  
Diagnosis Date  Arthritis 5/5/2010  Diabetes mellitus type 2, diet-controlled ([facility]  DVT (deep venous thrombosis) ([facility] right LE DVT (mostly distal and non-occlusive) discovered 6/15  
 HTN (hypertension) 5/5/2010  
 hypertensive heart disease (LVH)  Hypercholesteremia 5/5/2010  in 4/09  Orthostatic hypotension Complaints of dizziness.  (lying) to 120 (standing) in 3/18/11.  Parkinson disease ([facility] diagnosed around middle of may 2015 Nurse Navigator(NN) contacted the patient by telephone to perform post hospital discharge assessment but was unable to reach the patient. VM left for the patient or care giver to return a call to this office to complete a discharge assessment and to schedule a hospital follow up appointment.

## 2017-05-01 NOTE — TELEPHONE ENCOUNTER
Kristel Arango with [facility] - INPATIENT calling, states that they received order signed by nurse and it need to be signed by MD for PT/OT. Kristel Arango is asking if Dr. Hardeep Chong can write order to show in [facility] care.     Call [phone] direct line and or main line [phone]    Asking this be addressed  ASAP

## 2017-05-01 NOTE — TELEPHONE ENCOUNTER
Prema Quan calling back and ask that nurse Brenda TAO.) be informed that discharge summary is with nurse navigator Sony FERITAS). Also notes that [facility] is CHS and also parkinson's.     Order needed for skilled nursing/PT AND OT

## 2017-05-02 DIAGNOSIS — E78.9 LIPID DISORDER: ICD-10-CM

## 2017-05-02 DIAGNOSIS — E87.6 HYPOKALEMIA: ICD-10-CM

## 2017-05-02 DIAGNOSIS — I10 ESSENTIAL HYPERTENSION: ICD-10-CM

## 2017-05-02 DIAGNOSIS — I10 ACCELERATED HYPERTENSION: ICD-10-CM

## 2017-05-02 DIAGNOSIS — I50.32 DIASTOLIC DYSFUNCTION WITH CHRONIC HEART FAILURE (HCC): ICD-10-CM

## 2017-05-02 DIAGNOSIS — G20 PARKINSONS DISEASE (HCC): ICD-10-CM

## 2017-05-02 DIAGNOSIS — I95.1 ORTHOSTATIC HYPOTENSION: Chronic | ICD-10-CM

## 2017-05-03 ENCOUNTER — HOME CARE VISIT (OUTPATIENT)
Dept: SCHEDULING | Facility: HOME HEALTH | Age: 82
End: 2017-05-03

## 2017-05-03 RX ORDER — POTASSIUM CHLORIDE 750 MG/1
10 TABLET, FILM COATED, EXTENDED RELEASE ORAL 2 TIMES DAILY
Qty: 60 TAB | Refills: 1 | Status: SHIPPED | OUTPATIENT
Start: 2017-05-03 | End: 2017-10-19 | Stop reason: SDUPTHER

## 2017-05-03 RX ORDER — FUROSEMIDE 40 MG/1
40 TABLET ORAL DAILY
Qty: 30 TAB | Refills: 1 | Status: SHIPPED | OUTPATIENT
Start: 2017-05-03 | End: 2017-10-19 | Stop reason: SDUPTHER

## 2017-05-03 RX ORDER — LABETALOL 300 MG/1
300 TABLET, FILM COATED ORAL 2 TIMES DAILY
Qty: 60 TAB | Refills: 1 | Status: SHIPPED | OUTPATIENT
Start: 2017-05-03 | End: 2017-08-24 | Stop reason: SDUPTHER

## 2017-05-03 RX ORDER — CARBIDOPA AND LEVODOPA 25; 250 MG/1; MG/1
1 TABLET ORAL 3 TIMES DAILY
Qty: 90 TAB | Refills: 2 | Status: CANCELLED | OUTPATIENT
Start: 2017-05-03

## 2017-05-03 RX ORDER — SIMVASTATIN 40 MG/1
40 TABLET, FILM COATED ORAL
Qty: 30 TAB | Refills: 1 | Status: SHIPPED | OUTPATIENT
Start: 2017-05-03 | End: 2017-10-19 | Stop reason: SDUPTHER

## 2017-05-03 RX ORDER — LOSARTAN POTASSIUM 50 MG/1
TABLET ORAL
Qty: 30 TAB | Refills: 1 | Status: SHIPPED | OUTPATIENT
Start: 2017-05-03 | End: 2017-07-19 | Stop reason: SDUPTHER

## 2017-05-25 ENCOUNTER — TELEPHONE (OUTPATIENT)
Dept: FAMILY MEDICINE CLINIC | Age: 82
End: 2017-05-25

## 2017-05-25 NOTE — TELEPHONE ENCOUNTER
Ochoa Campos with [facility] health called stating that she would like to speak with Dr. Sammie Rosenberg nurse regarding patient as she has started providing care for patient

## 2017-05-30 ENCOUNTER — TELEPHONE (OUTPATIENT)
Dept: FAMILY MEDICINE CLINIC | Age: 82
End: 2017-05-30

## 2017-05-30 DIAGNOSIS — G20 PARKINSONS DISEASE (HCC): ICD-10-CM

## 2017-05-30 RX ORDER — CARBIDOPA AND LEVODOPA 25; 250 MG/1; MG/1
TABLET ORAL
Qty: 90 TAB | Refills: 0 | Status: SHIPPED | OUTPATIENT
Start: 2017-05-30 | End: 2017-06-30 | Stop reason: SDUPTHER

## 2017-05-30 NOTE — TELEPHONE ENCOUNTER
Megan Bush with [facility] calling and asking for order for Speech Therapy.       Call [phone]    thanks

## 2017-05-30 NOTE — TELEPHONE ENCOUNTER
Ellen Givens with [facility] calling and asking for order for Speech Therapy.        Call [phone]     Ok for speech therapy

## 2017-06-11 RX ORDER — SERTRALINE HYDROCHLORIDE 50 MG/1
TABLET, FILM COATED ORAL
Qty: 90 TAB | Refills: 0 | Status: SHIPPED | OUTPATIENT
Start: 2017-06-11 | End: 2017-10-19 | Stop reason: SDUPTHER

## 2017-08-28 RX ORDER — LOSARTAN POTASSIUM 50 MG/1
TABLET ORAL
Qty: 14 TAB | Refills: 0 | Status: SHIPPED | OUTPATIENT
Start: 2017-08-28 | End: 2017-10-19 | Stop reason: SDUPTHER

## 2017-10-19 ENCOUNTER — OFFICE VISIT (OUTPATIENT)
Dept: FAMILY MEDICINE CLINIC | Age: 82
End: 2017-10-19

## 2017-10-19 ENCOUNTER — HOSPITAL ENCOUNTER (OUTPATIENT)
Dept: LAB | Age: 82
Discharge: HOME | End: 2017-10-19
Payer: MEDICARE

## 2017-10-19 VITALS
HEART RATE: 80 BPM | TEMPERATURE: 98 F | WEIGHT: 159 LBS | HEIGHT: 65 IN | SYSTOLIC BLOOD PRESSURE: 165 MMHG | BODY MASS INDEX: 26.49 KG/M2 | RESPIRATION RATE: 16 BRPM | DIASTOLIC BLOOD PRESSURE: 109 MMHG

## 2017-10-19 DIAGNOSIS — E87.6 HYPOKALEMIA: ICD-10-CM

## 2017-10-19 DIAGNOSIS — I48.91 ATRIAL FIBRILLATION, UNSPECIFIED TYPE (HCC): ICD-10-CM

## 2017-10-19 DIAGNOSIS — F32.A DEPRESSION, UNSPECIFIED DEPRESSION TYPE: ICD-10-CM

## 2017-10-19 DIAGNOSIS — I10 ESSENTIAL HYPERTENSION: Primary | ICD-10-CM

## 2017-10-19 DIAGNOSIS — I50.32 DIASTOLIC DYSFUNCTION WITH CHRONIC HEART FAILURE (HCC): ICD-10-CM

## 2017-10-19 DIAGNOSIS — G20 PARKINSONS DISEASE (HCC): ICD-10-CM

## 2017-10-19 DIAGNOSIS — E78.9 LIPID DISORDER: ICD-10-CM

## 2017-10-19 PROCEDURE — 80048 BASIC METABOLIC PNL TOTAL CA: CPT

## 2017-10-19 PROCEDURE — 85027 COMPLETE CBC AUTOMATED: CPT

## 2017-10-19 PROCEDURE — 83735 ASSAY OF MAGNESIUM: CPT

## 2017-10-19 RX ORDER — CARBIDOPA AND LEVODOPA 25; 250 MG/1; MG/1
TABLET ORAL
Qty: 90 TAB | Refills: 3 | Status: SHIPPED | OUTPATIENT
Start: 2017-10-19

## 2017-10-19 RX ORDER — LABETALOL 300 MG/1
TABLET, FILM COATED ORAL
Qty: 60 TAB | Refills: 5 | Status: SHIPPED | OUTPATIENT
Start: 2017-10-19

## 2017-10-19 RX ORDER — LOSARTAN POTASSIUM 50 MG/1
50 TABLET ORAL DAILY
Qty: 30 TAB | Refills: 5 | Status: SHIPPED | OUTPATIENT
Start: 2017-10-19

## 2017-10-19 RX ORDER — SIMVASTATIN 40 MG/1
40 TABLET, FILM COATED ORAL
Qty: 30 TAB | Refills: 5 | Status: ON HOLD | OUTPATIENT
Start: 2017-10-19 | End: 2018-07-14

## 2017-10-19 RX ORDER — SERTRALINE HYDROCHLORIDE 50 MG/1
TABLET, FILM COATED ORAL
Qty: 30 TAB | Refills: 5 | Status: ON HOLD | OUTPATIENT
Start: 2017-10-19 | End: 2018-07-14

## 2017-10-19 RX ORDER — POTASSIUM CHLORIDE 750 MG/1
10 TABLET, FILM COATED, EXTENDED RELEASE ORAL 2 TIMES DAILY
Qty: 60 TAB | Refills: 5 | Status: SHIPPED | OUTPATIENT
Start: 2017-10-19 | End: 2018-01-08

## 2017-10-19 RX ORDER — FUROSEMIDE 40 MG/1
40 TABLET ORAL DAILY
Qty: 30 TAB | Refills: 5 | Status: SHIPPED | OUTPATIENT
Start: 2017-10-19 | End: 2018-01-13

## 2017-10-19 NOTE — PROGRESS NOTES
Subjective Delia Fuentes is an 80 y.o. male who presents for follow up of chronic conditions. No concerns today. States she feels well. Diet: regular, daughter prepares food Exercise: due to Parkinson's unable to be as mobile as he would like. HTN: ran out of meds so high in office today but otherwise, well controlled. HFpEF: stable on lasix HLD: stable on zocor Afib: stable, on eliquis Depression: denies lack of interest, feels down less than half days of the week. On zoloft. Parkinsons: on sinemet, has not established with Neurologist. 
 
 
Past Medical History - reviewed: 
Past Medical History:  
Diagnosis Date  Arthritis 5/5/2010  Diabetes mellitus type 2, diet-controlled ([facility]  DVT (deep venous thrombosis) ([facility] right LE DVT (mostly distal and non-occlusive) discovered 6/15  
 HTN (hypertension) 5/5/2010  
 hypertensive heart disease (LVH)  Hypercholesteremia 5/5/2010  in 4/09  Orthostatic hypotension Complaints of dizziness.  (lying) to 120 (standing) in 3/18/11.  Parkinson disease ([facility] diagnosed around middle of may 2015 ROS 
CONSTITUTIONAL: no fever CARDIOVASCULAR: no chest pain RESPIRATORY: no shortness of breath Physical Exam 
Visit Vitals  BP (!) 165/109 (BP 1 Location: Right arm, BP Patient Position: Sitting)  Pulse 80  Temp 98 °F (36.7 °C) (Oral)  Resp 16  
 Ht 5' 5\" (1.651 m)  Wt 159 lb (72.1 kg)  BMI 26.46 kg/m2 General appearance - alert, appears frail, does not speak much Eyes - pupils equal and reactive Mouth - mucous membranes moist, pharynx normal without lesions Neck - supple, no significant adenopathy Chest - clear to auscultation, no wheezes or rhonchi, symmetric air entry Heart - irregularly irregular, no murmurs Abdomen - soft, nontender, nondistended Neurological - unsteady gait Extremities - no pedal edema Skin - normal coloration and turgor Assessment/Plan ICD-10-CM ICD-9-CM 1. Essential hypertension I10 401.9 labetalol (NORMODYNE) 300 mg tablet  
   losartan (COZAAR) 50 mg tablet  
   furosemide (LASIX) 40 mg tablet  
   potassium chloride SR (KLOR-CON 10) 10 mEq tablet CBC W/O DIFF  
   METABOLIC PANEL, BASIC MAGNESIUM 2. Diastolic dysfunction with chronic heart failure (HCC) I50.32 428.32 furosemide (LASIX) 40 mg tablet CBC W/O DIFF 3. Hypokalemia E87.6 276.8 CBC W/O DIFF  
   METABOLIC PANEL, BASIC MAGNESIUM 4. Lipid disorder E78.9 272.9 simvastatin (ZOCOR) 40 mg tablet CBC W/O DIFF 5. Atrial fibrillation, unspecified type (HCC) I48.91 427.31 apixaban (ELIQUIS) 2.5 mg tablet CBC W/O DIFF 6. Depression, unspecified depression type F32.9 311 sertraline (ZOLOFT) 50 mg tablet CBC W/O DIFF 7. Parkinsons disease ([facility] G20 332.0 carbidopa-levodopa (SINEMET)  mg per tablet HTN: check BMP. High in office today but patient ran out of his BP meds. Refilled labetalol, losartan HFpEF: refilled lasix Depression: PHQ2 1/2. refilled Zoloft HLD: check lipids, refilled zocor Chronic Afib: refilled eliquis Parkinson's: has not established with a Neurologist. Refer to Neuro. Refill sinemet. Social: discussed assisted living and adult days car with daughter Armin Ty. Follow-up Disposition: 
Return in about 3 months (around 1/19/2018). I have discussed the diagnosis with the patient and the intended plan as seen in the above orders. The patient has received an after-visit summary and questions were answered concerning future plans. I have discussed medication side effects and warnings with the patient as well. Adriana Ha MD 
Family Medicine Resident

## 2017-10-19 NOTE — PATIENT INSTRUCTIONS
Neurologist 
[facility] [address] [facility] [address] [place], [address] [address] 
[phone]

## 2017-10-19 NOTE — MR AVS SNAPSHOT
Visit Information Date & Time Provider Department Dept. Phone Encounter #  
 10/19/2017  9:05 AM Nubia López, [address] [facility] [phone] [id number] Upcoming Health Maintenance Date Due HEMOGLOBIN A1C Q6M 6/14/2017 INFLUENZA AGE 9 TO ADULT 8/1/2017 EYE EXAM RETINAL OR DILATED Q1 12/14/2017* FOOT EXAM Q1 12/14/2017 MICROALBUMIN Q1 12/14/2017 LIPID PANEL Q1 12/14/2017 MEDICARE YEARLY EXAM 12/15/2017 GLAUCOMA SCREENING Q2Y 12/14/2018 DTaP/Tdap/Td series (2 - Td) 9/30/2022 *Topic was postponed. The date shown is not the original due date. Allergies as of 10/19/2017  Review Complete On: 10/19/2017 By: Rosaline Grimes No Known Allergies Current Immunizations  Reviewed on 12/14/2016 Name Date Influenza High Dose Vaccine PF 12/14/2016, 2/7/2015 Influenza Vaccine Split 9/30/2012 Pneumococcal Conjugate (PCV-13) 3/11/2015 TD Vaccine 6/3/2011 TDAP Vaccine 9/30/2012 ZZZ-RETIRED (DO NOT USE) Pneumococcal Vaccine (Unspecified Type) 6/3/2011 Not reviewed this visit You Were Diagnosed With   
  
 Codes Comments Essential hypertension    -  Primary ICD-10-CM: I10 
ICD-9-CM: 401.9 Diastolic dysfunction with chronic heart failure (HCC)     ICD-10-CM: I50.32 
ICD-9-CM: 428.32 Hypokalemia     ICD-10-CM: E87.6 ICD-9-CM: 276.8 Lipid disorder     ICD-10-CM: E78.9 ICD-9-CM: 272.9 [facility] fibrillation[facility] unspecified [facility]     ICD-10-CM: I48.91 
ICD-9-CM: 427.31 Depression, unspecified depression type     ICD-10-CM: F32.9 ICD-9-CM: 037 Parkinsons disease ([facility]     ICD-10-CM: G20 
ICD-9-CM: 332.0 Vitals BP Pulse Temp Resp Height(growth percentile) Weight(growth percentile) (!) 165/109 (BP 1 Location: Right arm, BP Patient Position: Sitting) 80 98 °F (36.7 °C) (Oral) 16 5' 5\" (1.651 m) 159 lb (72.1 kg) BMI Smoking Status 26.46 kg/m2 Former Smoker BMI and BSA Data   
 Body Mass Index Body Surface Area  
 26.46 kg/m 2 1.82 m 2 Preferred Pharmacy Pharmacy Name Phone [facility] [address] [place] [address] [phone] Your Updated Medication List  
  
   
This list is accurate as of: 10/19/17  9:27 AM.  Always use your most recent med list.  
  
  
  
  
 apixaban 2.5 mg tablet Commonly known as:  ELIQUIS  
TAKE ONE TABLET BY MOUTH TWICE DAILY  
  
 carbidopa-levodopa  mg per tablet Commonly known as:  SINEMET  
TAKE ONE TABLET BY MOUTH THREE TIMES DAILY  
  
 furosemide 40 mg tablet Commonly known as:  LASIX Take 1 Tab by mouth daily. labetalol 300 mg tablet Commonly known as:  NORMODYNE  
TAKE ONE TABLET BY MOUTH TWICE DAILY losartan 50 mg tablet Commonly known as:  COZAAR Take 1 Tab by mouth daily. potassium chloride SR 10 mEq tablet Commonly known as:  KLOR-CON 10 Take 1 Tab by mouth two (2) times a day. sertraline 50 mg tablet Commonly known as:  ZOLOFT  
TAKE ONE TABLET BY MOUTH ONCE DAILY FOR ANXIETY  
  
 simvastatin 40 mg tablet Commonly known as:  ZOCOR Take 1 Tab by mouth nightly. therapeutic multivitamin tablet Commonly known as:  [facility] Take 1 Tab by mouth daily. Prescriptions Sent to Pharmacy Refills  
 labetalol (NORMODYNE) 300 mg tablet 5 Sig: TAKE ONE TABLET BY MOUTH TWICE DAILY Class: Normal  
 Pharmacy: [address] Ph #: [phone]  
 losartan (COZAAR) 50 mg tablet 5 Sig: Take 1 Tab by mouth daily. Class: Normal  
 Pharmacy: [address] Ph #: [phone] Route: Oral  
 furosemide (LASIX) 40 mg tablet 5 Sig: Take 1 Tab by mouth daily. Class: Normal  
 Pharmacy: [address] Ph #: [phone]  Route: Oral  
 sertraline (ZOLOFT) 50 mg tablet 5 Sig: TAKE ONE TABLET BY MOUTH ONCE DAILY FOR ANXIETY Class: Normal  
 Pharmacy: [facility] [address] [place] [address] Ph #: [phone]  
 potassium chloride SR (KLOR-CON 10) 10 mEq tablet 5 Sig: Take 1 Tab by mouth two (2) times a day. Class: Normal  
 Pharmacy: [facility] [address] [place] [address] Ph #: [phone] Route: Oral  
 simvastatin (ZOCOR) 40 mg tablet 5 Sig: Take 1 Tab by mouth nightly. Class: Normal  
 Pharmacy: [facility] [address] [place] [address] Ph #: [phone] Route: Oral  
 apixaban (ELIQUIS) 2.5 mg tablet 12 Sig: TAKE ONE TABLET BY MOUTH TWICE DAILY Class: Normal  
 Pharmacy: [facility] [address] [place] [address] Ph #: [phone]  
 carbidopa-levodopa (SINEMET)  mg per tablet 3 Sig: TAKE ONE TABLET BY MOUTH THREE TIMES DAILY Class: Normal  
 Pharmacy: [facility] [address] [place] [address] Ph #: [phone] We Performed the Following CBC W/O DIFF [75719 CPT(R)] MAGNESIUM [id number] CPT(R)] METABOLIC PANEL, BASIC [69283 CPT(R)] Patient Instructions Neurologist 
Miguel Ángel [address] 
[phone] Introducing [facility]! Alicia Gauthier introduces [employer] patient portal. Now you can access parts of your medical record, email your doctor's office, and request medication refills online. 1. In your internet browser, go to [email or web address] 2. Click on the First Time User? Click Here link in the Sign In box. You will see the New Member Sign Up page. 3. Enter your [employer] Access Code exactly as it appears below. You will not need to use this code after youve completed the sign-up process. If you do not sign up before the expiration date, you must request a new code.  
 
· [employer] Access Code: [email or web address] Expires: 1/17/2018  9:27 AM 
 
4. Enter the last four digits of your Social Security Number (xxxx) and Date of Birth (mm/dd/yyyy) as indicated and click Submit. You will be taken to the next sign-up page. 5. Create a [employer] ID. This will be your [employer] login ID and cannot be changed, so think of one that is secure and easy to remember. 6. Create a [employer] password. You can change your password at any time. 7. Enter your Password Reset Question and Answer. This can be used at a later time if you forget your password. 8. Enter your e-mail address. You will receive e-mail notification when new information is available in [address] 9. Click Sign Up. You can now view and download portions of your medical record. 10. Click the Download Summary menu link to download a portable copy of your medical information. If you have questions, please visit the Frequently Asked Questions section of the [employer] website. Remember, [employer] is NOT to be used for urgent needs. For medical emergencies, dial 911. Now available from your iPhone and Android! Please provide this summary of care documentation to your next provider. Your primary care clinician is listed as Talya Laughlin. If you have any questions after today's visit, please call [phone].

## 2017-10-19 NOTE — PROGRESS NOTES
I saw and evaluated the patient, performing the key elements of the service. I discussed the findings, assessment and plan with the resident and agree with the resident's findings and plan as documented in the resident's note. Will have resident follow up on status of flu vaccine Would recommend high dose flu

## 2017-10-19 NOTE — PROGRESS NOTES
Chief Complaint Patient presents with  Medication Evaluation PT is being seen for an evaluation on blood pressure medication.

## 2017-10-20 LAB
BUN SERPL-MCNC: 24 MG/DL (ref 8–27)
BUN/CREAT SERPL: 19 (ref 10–24)
CALCIUM SERPL-MCNC: 9.6 MG/DL (ref 8.6–10.2)
CHLORIDE SERPL-SCNC: 99 MMOL/L (ref 96–106)
CO2 SERPL-SCNC: 25 MMOL/L (ref 18–29)
CREAT SERPL-MCNC: 1.27 MG/DL (ref 0.76–1.27)
ERYTHROCYTE [DISTWIDTH] IN BLOOD BY AUTOMATED COUNT: 13.5 % (ref 12.3–15.4)
GFR SERPLBLD CREATININE-BSD FMLA CKD-EPI: 53 ML/MIN/1.73
GFR SERPLBLD CREATININE-BSD FMLA CKD-EPI: 61 ML/MIN/1.73
GLUCOSE SERPL-MCNC: 97 MG/DL (ref 65–99)
HCT VFR BLD AUTO: 38.7 % (ref 37.5–51)
HGB BLD-MCNC: 12.8 G/DL (ref 12.6–17.7)
INTERPRETATION: NORMAL
MAGNESIUM SERPL-MCNC: 2 MG/DL (ref 1.6–2.3)
MCH RBC QN AUTO: 30.5 PG (ref 26.6–33)
MCHC RBC AUTO-ENTMCNC: 33.1 G/DL (ref 31.5–35.7)
MCV RBC AUTO: 92 FL (ref 79–97)
PLATELET # BLD AUTO: 172 X10E3/UL (ref 150–379)
POTASSIUM SERPL-SCNC: 4.7 MMOL/L (ref 3.5–5.2)
RBC # BLD AUTO: 4.2 X10E6/UL (ref 4.14–5.8)
SODIUM SERPL-SCNC: 144 MMOL/L (ref 134–144)
WBC # BLD AUTO: 3.6 X10E3/UL (ref 3.4–10.8)

## 2018-01-08 ENCOUNTER — HOSPITAL ENCOUNTER (INPATIENT)
Age: 83
LOS: 3 days | Discharge: NURSING, REHAB OR LONG-TERM CARE | DRG: 689 | End: 2018-01-13
Attending: EMERGENCY MEDICINE | Admitting: FAMILY MEDICINE
Payer: MEDICARE

## 2018-01-08 ENCOUNTER — APPOINTMENT (OUTPATIENT)
Dept: CT IMAGING | Age: 83
DRG: 689 | End: 2018-01-08
Attending: EMERGENCY MEDICINE
Payer: MEDICARE

## 2018-01-08 DIAGNOSIS — R77.8 ELEVATED TROPONIN: ICD-10-CM

## 2018-01-08 DIAGNOSIS — G45.9 TRANSIENT CEREBRAL ISCHEMIA, UNSPECIFIED TYPE: ICD-10-CM

## 2018-01-08 DIAGNOSIS — N39.0 URINARY TRACT INFECTION WITHOUT HEMATURIA, SITE UNSPECIFIED: ICD-10-CM

## 2018-01-08 DIAGNOSIS — N17.9 ACUTE RENAL FAILURE, UNSPECIFIED ACUTE RENAL FAILURE TYPE (HCC): Primary | ICD-10-CM

## 2018-01-08 DIAGNOSIS — I10 ORTHOSTATIC HYPERTENSION: ICD-10-CM

## 2018-01-08 LAB
BASOPHILS # BLD: 0 K/UL (ref 0–0.1)
BASOPHILS NFR BLD: 1 % (ref 0–1)
EOSINOPHIL # BLD: 0.2 K/UL (ref 0–0.4)
EOSINOPHIL NFR BLD: 4 % (ref 0–7)
ERYTHROCYTE [DISTWIDTH] IN BLOOD BY AUTOMATED COUNT: 14.5 % (ref 11.5–14.5)
HCT VFR BLD AUTO: 40.2 % (ref 36.6–50.3)
HGB BLD-MCNC: 12.9 G/DL (ref 12.1–17)
LYMPHOCYTES # BLD: 1.4 K/UL (ref 0.8–3.5)
LYMPHOCYTES NFR BLD: 31 % (ref 12–49)
MCH RBC QN AUTO: 30.3 PG (ref 26–34)
MCHC RBC AUTO-ENTMCNC: 32.1 G/DL (ref 30–36.5)
MCV RBC AUTO: 94.4 FL (ref 80–99)
MONOCYTES # BLD: 0.5 K/UL (ref 0–1)
MONOCYTES NFR BLD: 12 % (ref 5–13)
NEUTS SEG # BLD: 2.3 K/UL (ref 1.8–8)
NEUTS SEG NFR BLD: 52 % (ref 32–75)
PLATELET # BLD AUTO: 176 K/UL (ref 150–400)
RBC # BLD AUTO: 4.26 M/UL (ref 4.1–5.7)
WBC # BLD AUTO: 4.4 K/UL (ref 4.1–11.1)

## 2018-01-08 PROCEDURE — 84484 ASSAY OF TROPONIN QUANT: CPT | Performed by: EMERGENCY MEDICINE

## 2018-01-08 PROCEDURE — 36415 COLL VENOUS BLD VENIPUNCTURE: CPT | Performed by: EMERGENCY MEDICINE

## 2018-01-08 PROCEDURE — 80053 COMPREHEN METABOLIC PANEL: CPT | Performed by: EMERGENCY MEDICINE

## 2018-01-08 PROCEDURE — 93005 ELECTROCARDIOGRAM TRACING: CPT

## 2018-01-08 PROCEDURE — 83880 ASSAY OF NATRIURETIC PEPTIDE: CPT | Performed by: EMERGENCY MEDICINE

## 2018-01-08 PROCEDURE — 94762 N-INVAS EAR/PLS OXIMTRY CONT: CPT

## 2018-01-08 PROCEDURE — 99285 EMERGENCY DEPT VISIT HI MDM: CPT

## 2018-01-08 PROCEDURE — 70450 CT HEAD/BRAIN W/O DYE: CPT

## 2018-01-08 PROCEDURE — 96360 HYDRATION IV INFUSION INIT: CPT

## 2018-01-08 PROCEDURE — 85025 COMPLETE CBC W/AUTO DIFF WBC: CPT | Performed by: EMERGENCY MEDICINE

## 2018-01-08 NOTE — IP AVS SNAPSHOT
[address] [facility] [address] 
 
 
 [address] [facility] 
[phone] Patient: Ceasar Gates MRN: [id number] :1935 About your hospitalization You were admitted on:  2018 You last received care in the:  SF 4M POST SURG ORT 1 You were discharged on:  2018 Why you were hospitalized Your primary diagnosis was:  Acute Metabolic Encephalopathy Your diagnoses also included:  Accelerated Hypertension, Complicated Uti (Urinary Tract Infection), Altered Mental Status, Elevated Troponin, Htn (Hypertension), Hypotension, Postural, Orthostatic Hypotension, Parkinsons Disease (Hcc), Diastolic Dysfunction With Chronic Heart Failure (Hcc), History Of Dvt (Deep Vein Thrombosis), Diabetes Mellitus Type Ii, Controlled (Hcc), Encephalopathy, Leukopenia, Normocytic Anemia Follow-up Information Follow up With Details Comments Contact Info Steve Hamilton NP Go on 2018 Neurology appt. for  Parkinson's mgmt, with nurse practitioner:  Anna Umana: 1pm, Appt at: 130pm [place] [address] [address] [facility]rology [facility] [address] 
[phone] Jax Jasso MD Go in 3 days For transitional care appointment  Samir Resendizsus [address] [place] [address] 
[phone] [facility]   [address] [place] [address] 
[phone] Your Scheduled Appointments  11:20 AM EST HOSPITAL DISCHARGE with Marcos Vega MD  
[facility] ([facility]) [address] [facility] [address] [facility]  
[phone] 2018 11:30 AM EST Follow Up with Steve Hamilton NP  [facility] ([facility]) [facility] [address] [facility] [address] [facility] [id number] [phone] Discharge Orders None  A check farhad indicates which time of day the medication should be taken. My Medications CHANGE how you take these medications Instructions Each Dose to Equal  
 Morning Noon Evening Bedtime  
 furosemide 20 mg tablet Commonly known as:  LASIX What changed:   
- medication strength 
- how much to take Take 1 Tab by mouth daily. 20 mg CONTINUE taking these medications Instructions Each Dose to Equal  
 Morning Noon Evening Bedtime  
 apixaban 2.5 mg tablet Commonly known as:  Orin Greene Your last dose was:  1/13/18 9:43 a.m. Your next dose is:  1/13/18 evening dose. TAKE ONE TABLET BY MOUTH TWICE DAILY  
     
   
   
   
  
 carbidopa-levodopa  mg per tablet Commonly known as:  SINEMET Your last dose was:  1/13/18 09:43 a.m. Your next dose is:  1/13/18 Two doses remaining. 1600 and 2100 TAKE ONE TABLET BY MOUTH THREE TIMES DAILY  
     
   
   
   
  
 labetalol 300 mg tablet Commonly known as:  Demetrio Alek Your last dose was:  1/13/18 9:43 a.m. Your next dose is:  1/13/18 evening dose TAKE ONE TABLET BY MOUTH TWICE DAILY losartan 50 mg tablet Commonly known as:  COZAAR Your last dose was:  1/13/18 9:43 a.m. Your next dose is:  1/14/18 Morning Take 1 Tab by mouth daily. 50 mg  
    
   
   
   
  
 sertraline 50 mg tablet Commonly known as:  ZOLOFT Your last dose was:  1/13/18 9:44 a.m. Your next dose is:  1/14/18 Morning TAKE ONE TABLET BY MOUTH ONCE DAILY FOR ANXIETY  
     
   
   
   
  
 simvastatin 40 mg tablet Commonly known as:  ZOCOR Your last dose was:  1/12/18 9:05 p.m. Your next dose is:  1/13/18 p.m. Take 1 Tab by mouth nightly. 40 mg  
    
   
   
   
  
 therapeutic multivitamin tablet Commonly known as:  [facility] Your last dose was:  Not taken in the hospital.   
Your next dose is:  1/14/18 per home schedule.    
   
 Take 1 Tab by mouth daily.  
 1 Tab Where to Get Your Medications Information on where to get these meds will be given to you by the nurse or doctor. ! Ask your nurse or doctor about these medications  
  furosemide 20 mg tablet Discharge Instructions REHAB / SKILLED NURSING FACILITY DISCHARGE INSTRUCTIONS Albertina Tejeda / [id number] : 1935 Admission date: 2018 Discharge date: 2018 Primary care provider: Tony Mcconnell MD 
 
Discharging provider:  Dang Powell MD  - Family Medicine Resident Jones Chun MD - Attending, Family Medicine Inocente Reeder . . . . . . . . . . . . . . . . . . . . . . . . . . . . . . . . . . . . . . . . . . . . . . . . . . . . . . . . . . . . . . . . . . . . . . Inocente Reeder FINAL DIAGNOSES & HOSPITAL COURSE: 
Encephalopathy likely 2/2 Complicated UTI - Resolved. CT Head neg for acute process but signficant for microvascular infarcts of undetermined age. POA UA significant for WBCs, moderate LE, 4+ bacteria. TSH 3.15. Orthostatics in ED positive for inadequate response of BP to positional changes. Echo EF 50% with mildly increased wall thickness. Carotid dopplers <50% ICA stenosis BL. UA showed moderate LE, >100WBC, 4+ bacteria. Ucx was not collected in ED. Pt was treated on 5 days of Rocephin. Neuro was consulted who did not recommend an MRI because it would likely not . Cardiology recommended discontinuing ARB and cutting back on Lasix or discontinuing if patient continued to have orthostatics. At discharge patient was back to baseline mentation per daughter and PT/OT recommended SNF. Daughter and patient were agreeable to SNF and patient was discharged to [facility] 18.  
- Follow up with PCP 1 week after discharge from SNF for BP and BMP 
 
  
Accelerated Hypertension with orthostatic hypotension - Orthostatics in ED showed patient's BP did not respond adequately. Held home Lasix 40mg daily and gave IVF.  Once patient was able to tolerate fluids and assured his kidney function was within normal limits discontinued IVF and resumed 1/2 of patient's home Lasix dose of 20mg. Cardiology recommended discontinuing home Losartan and increasing Labetalol to 300mg BID. Cardio also recommended Jobst stockings and said to consider stopping lasix if orthostatics persists. - Labetalol 300mg BID 
- Decreased Lasix from 40mg to 20mg daily 
- Consider stopping lasix if orthostatics persists - Follow up with PCP 1 week after discharge from SNF 
   
History of DVT - Continued home Eliquis 2.5mg BID 
- Continue home Eliquis 3.9YY BID 
   
Systolic Heart Failure with reduced EF - Echo EF 50% with mildly increased wall thickness. POA CXR celar. ProBNP elevated 3407 (last admission 2968), may be elevated at BL. Cards was consulted who increased dose of Labetalol to 300mg BID and discontinued his home Losartan. Decreased Lasix from 40mg to 20mg daily. Cardiology recommended discontinuing Lasix if patient continues to be orthostatic.  
   
At risk for TEVIN Stage 2 CKD - Resolved. POA GFR 37 (BL 60), Cr 2.08 (BL 1.1-1.4). Today Cr back to BL 1.05. Lasix was decreased from 40mg to 20mg daily once patient's kidney function returned to baseline.  
- Decreased Lasix 20mg 
   
ACS Rule out in patient with hypertroponinemia - 0.2, 0.18 and 0.26 respectively. Cards was consulted who stated not likely ACS. Increased Labetalol to 300mg BID and discontinued Losartan.  
   
Diabetes Mellitus T2 -  A1c 6.4. Patient was diet controlled and started on SSI. Continued diabetic diet. Patient's BG was well controlled while admitted. HLD - , TG 51, , HDL 86. Continued Simvastatin 40mg daily.  
   
Prolonged Qtc - Qtc of 495, Previous ECG on 4/7/17 showed qtc of 483. 
   
Parkinson Disease -Progression of disease may be contributing to instability. Continued Sinemet 25-250mg tablet TID.   
   
Depression- Continue  Zoloft 50mg qhs 
   
Electrolyte Abnormalities - Hypomagnesemia - Resolved, repleted as needed FOLLOW-UP CARE RECOMMENDATIONS: 
Follow-up Information Follow up With Details Comments Contact Info Mitchell Hernandez NP Go on 2/2/2018 Neurology appt. for  Parkinson's mgmt, with nurse practitioner:  Kristian Stewart: 1pm, Appt at: 130pm [place] [address] [address] [facility] [address] [address] 
[phone] Matt Sherwood MD Go in 3 days For transitional care appointment  Samir Mertriley Selina Casasadenike Resend[facility]sus [address] 
[phone] [facility]   [address] [address] [phone] It is very important that you keep follow-up appointment(s). Bring discharge papers, medication list (and/or medication bottles) to follow-up appointments for review by outpatient provider(s). FOLLOW-UP TESTS RECOMMENDED:  
- BMP, BP at PCP ONGOING TREATMENT PLAN: Discontinue Losartan,  Continue Labetalol 300mg BID PENDING TEST RESULTS: 
At the time of discharge the following test results are still pending: None. Please review these results as they become available. Specific symptoms to watch for: chest pain, shortness of breath, fever, chills, nausea, vomiting, diarrhea, change in mentation, falling, weakness, bleeding. DIET:  Diabetic Diet ACTIVITY:  Activity as tolerated and PT/OT Eval and Treat WOUND CARE: None needed EQUIPMENT needed:  None INCIDENTAL FINDINGS:  None GOALS OF CARE: 
  Eventual return to home/independent/assisted living Long term SNF Hospice  
x  Rehab Patient condition at discharge:  
Functional status Poor   
x  Deconditioned Independent Cognition Hans Carranza Forgetful (some sensescence) x  Dementia Catheters/lines (plus indication) Sanders PICC   
  PEG Code status X  Full code DNR [place] Pillar . . . . . . . . . . . . . . . . . . . . . . . . . . . . . . . . . . . . . . . . . . . . . . . . . . . [place] P[place] . . . . . . . . . . . . . . . . . . Max Garcia CHRONIC MEDICAL CONDITIONS: 
Problem List as of 1/13/2018  Date Reviewed: 1/9/2018 Codes Class Noted - Resolved Leukopenia ICD-10-CM: D72.819 ICD-9-CM: 288.50  1/12/2018 - Present Normocytic anemia ICD-10-CM: D64.9 ICD-9-CM: 285.9  1/12/2018 - Present * (Principal)Acute metabolic encephalopathy [facility]: G93.41 
ICD-9-CM: 348.31  1/12/2018 - Present Complicated UTI (urinary tract infection) ICD-10-CM: N39.0 ICD-9-CM: 599.0  1/9/2018 - Present History of DVT (deep vein thrombosis) ICD-10-CM: R82.979 ICD-9-CM: V12.51  4/11/2017 - Present Acute on chronic diastolic CHF (congestive heart failure) (HCC) ICD-10-CM: I50.33 ICD-9-CM: 428.33, 428.0  4/7/2017 - Present Diastolic dysfunction with chronic heart failure ([facility] ICD-10-CM: I50.32 
ICD-9-CM: 428.32  12/21/2015 - Present Mild anemia ICD-10-CM: D64.9 ICD-9-CM: 285.9  12/20/2015 - Present Accelerated hypertension ICD-10-CM: I10 
ICD-9-CM: 401.0  12/18/2015 - Present Situational anxiety ICD-10-CM: F41.8 ICD-9-CM: 300.09  6/17/2015 - Present BPH (benign prostatic hypertrophy) ICD-10-CM: N40.0 ICD-9-CM: 600.00  6/17/2015 - Present Parkinsons disease ([facility] ICD-10-CM: G20 
ICD-9-CM: 332.0  5/26/2015 - Present Asthma, mild intermittent ICD-10-CM: J45.20 ICD-9-CM: 493.90  3/17/2015 - Present Chronic lumbar pain ICD-10-CM: M54.5, G89.29 ICD-9-CM: 724.2, 338.29  3/11/2015 - Present DJD (degenerative joint disease), lumbar ICD-10-CM: M47.816 ICD-9-CM: 721.3  2/6/2015 - Present Diabetes mellitus type II, controlled ([facility] ICD-10-CM: E11.9 ICD-9-CM: 250.00  1/26/2015 - Present Lipid disorder ICD-10-CM: E78.9 ICD-9-CM: 272.9  8/16/2012 - Present Overview Signed 8/16/2012  1:36 PM by Ludwig العراقي MD  
  LDL goal < 70 Abnormal EKG ICD-10-CM: R94.31 
ICD-9-CM: 794.31  5/12/2011 - Present Overview Signed 7/6/2011  5:47 PM by Vasquez Hutchison MD  
  Echo 2d adult 5/19/11  
mild-mod LVH, EF 50-55% (low normal). Mod LAE, mild MR.  
  
Nm cardiac spect w stress / rest mult 5/19/11  
no inducible ischemia; LVEF 45% Cardiac dysrhythmia, unspecified ICD-10-CM: I49.9 ICD-9-CM: 427.9  5/12/2011 - Present HTN (hypertension) ICD-10-CM: I10 
ICD-9-CM: 401.9  3/19/2011 - Present Overview Signed 5/23/2011  8:08 AM by Vasquez Hutchison MD  
  ECHO 5/2011 = LVH, EF 50% ED (erectile dysfunction) ICD-10-CM: N52.9 ICD-9-CM: 607.84  3/19/2011 - Present RAD (reactive airway disease) ICD-10-CM: J45.909 ICD-9-CM: 493.90  3/19/2011 - Present H/O: GI bleed ICD-10-CM: Z87.19 ICD-9-CM: V12.79  5/5/2010 - Present RESOLVED: Encephalopathy ICD-10-CM: G93.40 ICD-9-CM: 348.30  1/10/2018 - 1/12/2018 RESOLVED: Altered mental status ICD-10-CM: R41.82 
ICD-9-CM: 780.97  1/9/2018 - 1/12/2018 RESOLVED: Elevated troponin ICD-10-CM: R74.8 ICD-9-CM: 790.6  1/9/2018 - 1/12/2018 RESOLVED: CHF exacerbation ([address] ICD-10-CM: I50.9 ICD-9-CM: 428.0  4/7/2017 - 4/8/2017 RESOLVED: Protein in urine ICD-10-CM: R80.9 ICD-9-CM: 791.0  5/28/2015 - 1/11/2016 RESOLVED: Hyperglycemia ICD-10-CM: R73.9 ICD-9-CM: 790.29  9/6/2012 - 3/11/2015 Overview Addendum 7/17/2014  5:13 PM by Delmy Alexandre MD  
  a1c 6.8 7/2014 Need to repeat to confirm T2DM RESOLVED: Weight loss, unintentional ICD-10-CM: R63.4 ICD-9-CM: 783.21  7/6/2011 - 3/11/2015 Overview Signed 7/6/2011  5:45 PM by Vasquez Hutchison MD  
  Saw nutritionist 6/2011 at [facility] RESOLVED: Grief ICD-10-CM: F43.20 ICD-9-CM: 309.0  6/3/2011 - 3/11/2015 Overview Signed 6/3/2011 11:06 AM by Vasquez Hutchison MD  
  Lost wife in 2011 RESOLVED: Prostatism ICD-10-CM: N40.0 ICD-9-CM: 600.90  6/3/2011 - 12/2/2015  RESOLVED: Orthostatic hypotension (Chronic) ICD-10-CM: I95.1 ICD-9-CM: 458.0  Unknown - 1/12/2018 Overview Signed 5/12/2011  2:55 PM by Lenora Xiong MD  
  Complaints of dizziness.  (lying) to 120 (standing) in 3/18/11. RESOLVED: Hypotension, postural ICD-10-CM: I95.1 ICD-9-CM: 458.0  3/19/2011 - 1/12/2018 Overview Signed 7/6/2011  5:47 PM by Jase Mcqueen MD  
  Echo 2d adult 5/19/11  
mild-mod LVH, EF 50-55% (low normal). Mod LAE, mild MR.  
  
Nm cardiac spect w stress / rest mult 5/19/11  
no inducible ischemia; LVEF 45% RESOLVED: Arthritis ICD-10-CM: M19.90 ICD-9-CM: 716.90  3/19/2011 - 12/2/2015 Information obtained by :  
I understand that if any problems occur once I am at home I am to contact my physician. I understand and acknowledge receipt of the instructions indicated above. Physician's or R.N.'s Signature                                                                  Date/Time Patient or Representative Signature                                                          Date/Time 
 
ACO Transitions of Care Introducing [employer] [facility] offers a voluntary care coordination program to provide high quality service and care to [facility] fee-for-service beneficiaries. Estevan Coyne was designed to help you enhance your health and well-being through the following services:  Transitions of Care  support for individuals who are transitioning from one care setting to another (example: Hospital to home).   
 
 Chronic and Complex Care Coordination  support for individuals and caregivers of those with serious or chronic illnesses or with more than one chronic (ongoing) condition and those who take a number of different medications. If you meet specific medical criteria, a [facility] [address] may call you directly to coordinate your care with your primary care physician and your other care providers. For questions about the [facility] programs, please, contact your physicians office. For general questions or additional information about Accountable Care Organizations: 
Please visit www.medicare.gov/acos. html or call 1-800-MEDICARE (1-454.225.9994) TTY users should call 4-440.353.1729. [employer] Announcement We are excited to announce that we are making your provider's discharge notes available to you in AxialMED. You will see these notes when they are completed and signed by the physician that discharged you from your recent hospital stay. If you have any questions or concerns about any information you see in AxialMED, please call the Health Information Department where you were seen or reach out to your Primary Care Provider for more information about your plan of care. Introducing [facility]! César Payne introduces [employer]ialMED patient portal. Now you can access parts of your medical record, email your doctor's office, and request medication refills online. 1. In your internet browser, go to [email or web address] 2. Click on the First Time User? Click Here link in the Sign In box. You will see the New Member Sign Up page. 3. Enter your [employer]ialMED Access Code exactly as it appears below. You will not need to use this code after youve completed the sign-up process. If you do not sign up before the expiration date, you must request a new code. · [employer]ialMED Access Code: [email or web address] Expires: 1/17/2018  8:27 AM 
 
4. Enter the last four digits of your Social Security Number (xxxx) and Date of Birth (mm/dd/yyyy) as indicated and click Submit.  You will be taken to the next sign-up page. 
5. Create a Workface ID. This will be your Workface login ID and cannot be changed, so think of one that is secure and easy to remember. 6. Create a Workface password. You can change your password at any time. 7. Enter your Password Reset Question and Answer. This can be used at a later time if you forget your password. 8. Enter your e-mail address. You will receive e-mail notification when new information is available in [address] 9. Click Sign Up. You can now view and download portions of your medical record. 10. Click the Download Summary menu link to download a portable copy of your medical information. If you have questions, please visit the Frequently Asked Questions section of the Workface website. Remember, Workface is NOT to be used for urgent needs. For medical emergencies, dial 911. Now available from your iPhone and Android! Unresulted Labs-Please follow up with your PCP about these lab tests Order Current Status CULTURE, BLOOD, PAIRED Preliminary result Providers Seen During Your Hospitalization Provider Specialty Primary office phone Shayy Forrest MD Emergency Medicine [phone] Jaleel Muller MD Family Practice [phone] Annetta Lynch MD Family Practice [phone] Your Primary Care Physician (PCP) Primary Care Physician Office Phone Office Fax Nick Nassar [phone] You are allergic to the following No active allergies Recent Documentation Height Weight BMI Smoking Status 1.778 m 75.1 kg 23.75 kg/m2 Former Smoker Emergency Contacts Name Discharge Info Relation Home Work Mobile Silvana Franks DISCHARGE CAREGIVER [3] Daughter [21] [phone] Patient Belongings  The following personal items are in your possession at time of discharge: 
  Dental Appliances: Uppers, With patient  Visual Aid: Glasses, At bedside      Home Medications: None   Jewelry: None  Clothing: Shirt, Pants, Socks, With patient, Footwear    Other Valuables: None Please provide this summary of care documentation to your next provider. Signatures-by signing, you are acknowledging that this After Visit Summary has been reviewed with you and you have received a copy. Patient Signature:  ____________________________________________________________ Date:  ____________________________________________________________  
  
Nataly Sleeper Provider Signature:  ____________________________________________________________ Date:  ____________________________________________________________

## 2018-01-09 ENCOUNTER — APPOINTMENT (OUTPATIENT)
Dept: GENERAL RADIOLOGY | Age: 83
DRG: 689 | End: 2018-01-09
Attending: EMERGENCY MEDICINE
Payer: MEDICARE

## 2018-01-09 PROBLEM — R77.8 ELEVATED TROPONIN: Status: ACTIVE | Noted: 2018-01-09

## 2018-01-09 PROBLEM — N39.0 UTI (URINARY TRACT INFECTION): Status: ACTIVE | Noted: 2018-01-09

## 2018-01-09 PROBLEM — R41.82 ALTERED MENTAL STATUS: Status: ACTIVE | Noted: 2018-01-09

## 2018-01-09 LAB
ALBUMIN SERPL-MCNC: 4 G/DL (ref 3.5–5)
ALBUMIN/GLOB SERPL: 1.1 {RATIO} (ref 1.1–2.2)
ALP SERPL-CCNC: 88 U/L (ref 45–117)
ALT SERPL-CCNC: 10 U/L (ref 12–78)
ANION GAP SERPL CALC-SCNC: 11 MMOL/L (ref 5–15)
ANION GAP SERPL CALC-SCNC: 12 MMOL/L (ref 5–15)
APPEARANCE UR: ABNORMAL
AST SERPL-CCNC: 25 U/L (ref 15–37)
ATRIAL RATE: 71 BPM
ATRIAL RATE: 75 BPM
BACTERIA URNS QL MICRO: ABNORMAL /HPF
BILIRUB SERPL-MCNC: 0.5 MG/DL (ref 0.2–1)
BILIRUB UR QL: NEGATIVE
BNP SERPL-MCNC: 3407 PG/ML (ref 0–450)
BUN SERPL-MCNC: 28 MG/DL (ref 6–20)
BUN SERPL-MCNC: 32 MG/DL (ref 6–20)
BUN/CREAT SERPL: 15 (ref 12–20)
BUN/CREAT SERPL: 17 (ref 12–20)
CALCIUM SERPL-MCNC: 7.6 MG/DL (ref 8.5–10.1)
CALCIUM SERPL-MCNC: 9.5 MG/DL (ref 8.5–10.1)
CALCULATED P AXIS, ECG09: 55 DEGREES
CALCULATED P AXIS, ECG09: 65 DEGREES
CALCULATED R AXIS, ECG10: -5 DEGREES
CALCULATED T AXIS, ECG11: -156 DEGREES
CALCULATED T AXIS, ECG11: 174 DEGREES
CHLORIDE SERPL-SCNC: 102 MMOL/L (ref 97–108)
CHLORIDE SERPL-SCNC: 109 MMOL/L (ref 97–108)
CHOLEST SERPL-MCNC: 229 MG/DL
CO2 SERPL-SCNC: 28 MMOL/L (ref 21–32)
CO2 SERPL-SCNC: 30 MMOL/L (ref 21–32)
COLOR UR: ABNORMAL
CREAT SERPL-MCNC: 1.61 MG/DL (ref 0.7–1.3)
CREAT SERPL-MCNC: 2.08 MG/DL (ref 0.7–1.3)
DIAGNOSIS, 93000: NORMAL
DIAGNOSIS, 93000: NORMAL
EPITH CASTS URNS QL MICRO: ABNORMAL /LPF
ERYTHROCYTE [DISTWIDTH] IN BLOOD BY AUTOMATED COUNT: 14.4 % (ref 11.5–14.5)
EST. AVERAGE GLUCOSE BLD GHB EST-MCNC: 137 MG/DL
GLOBULIN SER CALC-MCNC: 3.8 G/DL (ref 2–4)
GLUCOSE BLD STRIP.AUTO-MCNC: 155 MG/DL (ref 65–100)
GLUCOSE BLD STRIP.AUTO-MCNC: 84 MG/DL (ref 65–100)
GLUCOSE BLD STRIP.AUTO-MCNC: 84 MG/DL (ref 65–100)
GLUCOSE BLD STRIP.AUTO-MCNC: 95 MG/DL (ref 65–100)
GLUCOSE SERPL-MCNC: 54 MG/DL (ref 65–100)
GLUCOSE SERPL-MCNC: 89 MG/DL (ref 65–100)
GLUCOSE UR STRIP.AUTO-MCNC: NEGATIVE MG/DL
HBA1C MFR BLD: 6.4 % (ref 4.2–6.3)
HCT VFR BLD AUTO: 34.1 % (ref 36.6–50.3)
HDLC SERPL-MCNC: 86 MG/DL
HDLC SERPL: 2.7 {RATIO} (ref 0–5)
HGB BLD-MCNC: 11.2 G/DL (ref 12.1–17)
HGB UR QL STRIP: NEGATIVE
INR PPP: 1.1 (ref 0.9–1.1)
KETONES UR QL STRIP.AUTO: NEGATIVE MG/DL
LACTATE SERPL-SCNC: 1.6 MMOL/L (ref 0.4–2)
LDLC SERPL CALC-MCNC: 132.8 MG/DL (ref 0–100)
LEUKOCYTE ESTERASE UR QL STRIP.AUTO: ABNORMAL
LIPID PROFILE,FLP: ABNORMAL
MAGNESIUM SERPL-MCNC: 1.8 MG/DL (ref 1.6–2.4)
MCH RBC QN AUTO: 29.9 PG (ref 26–34)
MCHC RBC AUTO-ENTMCNC: 32.8 G/DL (ref 30–36.5)
MCV RBC AUTO: 90.9 FL (ref 80–99)
NITRITE UR QL STRIP.AUTO: NEGATIVE
P-R INTERVAL, ECG05: 150 MS
P-R INTERVAL, ECG05: 154 MS
PH UR STRIP: 6.5 [PH] (ref 5–8)
PHOSPHATE SERPL-MCNC: 3 MG/DL (ref 2.6–4.7)
PLATELET # BLD AUTO: 171 K/UL (ref 150–400)
POTASSIUM SERPL-SCNC: 3.4 MMOL/L (ref 3.5–5.1)
POTASSIUM SERPL-SCNC: 4.1 MMOL/L (ref 3.5–5.1)
PROT SERPL-MCNC: 7.8 G/DL (ref 6.4–8.2)
PROT UR STRIP-MCNC: 30 MG/DL
PROTHROMBIN TIME: 10.8 SEC (ref 9–11.1)
Q-T INTERVAL, ECG07: 436 MS
Q-T INTERVAL, ECG07: 456 MS
QRS DURATION, ECG06: 92 MS
QRS DURATION, ECG06: 94 MS
QTC CALCULATION (BEZET), ECG08: 486 MS
QTC CALCULATION (BEZET), ECG08: 495 MS
RBC # BLD AUTO: 3.75 M/UL (ref 4.1–5.7)
RBC #/AREA URNS HPF: ABNORMAL /HPF (ref 0–5)
SERVICE CMNT-IMP: ABNORMAL
SERVICE CMNT-IMP: NORMAL
SODIUM SERPL-SCNC: 143 MMOL/L (ref 136–145)
SODIUM SERPL-SCNC: 149 MMOL/L (ref 136–145)
SP GR UR REFRACTOMETRY: 1.01 (ref 1–1.03)
TRIGL SERPL-MCNC: 51 MG/DL (ref ?–150)
TROPONIN I SERPL-MCNC: 0.18 NG/ML
TROPONIN I SERPL-MCNC: 0.2 NG/ML
TROPONIN I SERPL-MCNC: 0.26 NG/ML
TSH SERPL DL<=0.05 MIU/L-ACNC: 3.15 UIU/ML (ref 0.36–3.74)
UROBILINOGEN UR QL STRIP.AUTO: 0.2 EU/DL (ref 0.2–1)
VENTRICULAR RATE, ECG03: 71 BPM
VENTRICULAR RATE, ECG03: 75 BPM
VLDLC SERPL CALC-MCNC: 10.2 MG/DL
WBC # BLD AUTO: 5 K/UL (ref 4.1–11.1)
WBC CASTS URNS QL MICRO: ABNORMAL /LPF
WBC URNS QL MICRO: >100 /HPF (ref 0–4)

## 2018-01-09 PROCEDURE — 74011250637 HC RX REV CODE- 250/637: Performed by: FAMILY MEDICINE

## 2018-01-09 PROCEDURE — 74011000258 HC RX REV CODE- 258: Performed by: STUDENT IN AN ORGANIZED HEALTH CARE EDUCATION/TRAINING PROGRAM

## 2018-01-09 PROCEDURE — 74011000258 HC RX REV CODE- 258: Performed by: EMERGENCY MEDICINE

## 2018-01-09 PROCEDURE — 97162 PT EVAL MOD COMPLEX 30 MIN: CPT

## 2018-01-09 PROCEDURE — 74011000250 HC RX REV CODE- 250: Performed by: FAMILY MEDICINE

## 2018-01-09 PROCEDURE — 83036 HEMOGLOBIN GLYCOSYLATED A1C: CPT | Performed by: FAMILY MEDICINE

## 2018-01-09 PROCEDURE — 99218 HC RM OBSERVATION: CPT

## 2018-01-09 PROCEDURE — 74011250636 HC RX REV CODE- 250/636: Performed by: EMERGENCY MEDICINE

## 2018-01-09 PROCEDURE — 74011250636 HC RX REV CODE- 250/636: Performed by: STUDENT IN AN ORGANIZED HEALTH CARE EDUCATION/TRAINING PROGRAM

## 2018-01-09 PROCEDURE — 81001 URINALYSIS AUTO W/SCOPE: CPT | Performed by: EMERGENCY MEDICINE

## 2018-01-09 PROCEDURE — 96361 HYDRATE IV INFUSION ADD-ON: CPT

## 2018-01-09 PROCEDURE — 93005 ELECTROCARDIOGRAM TRACING: CPT

## 2018-01-09 PROCEDURE — 85027 COMPLETE CBC AUTOMATED: CPT | Performed by: FAMILY MEDICINE

## 2018-01-09 PROCEDURE — 83605 ASSAY OF LACTIC ACID: CPT | Performed by: EMERGENCY MEDICINE

## 2018-01-09 PROCEDURE — 85610 PROTHROMBIN TIME: CPT | Performed by: FAMILY MEDICINE

## 2018-01-09 PROCEDURE — 96374 THER/PROPH/DIAG INJ IV PUSH: CPT

## 2018-01-09 PROCEDURE — 84484 ASSAY OF TROPONIN QUANT: CPT | Performed by: FAMILY MEDICINE

## 2018-01-09 PROCEDURE — 96360 HYDRATION IV INFUSION INIT: CPT

## 2018-01-09 PROCEDURE — 80061 LIPID PANEL: CPT | Performed by: FAMILY MEDICINE

## 2018-01-09 PROCEDURE — 74011250636 HC RX REV CODE- 250/636: Performed by: FAMILY MEDICINE

## 2018-01-09 PROCEDURE — 80048 BASIC METABOLIC PNL TOTAL CA: CPT | Performed by: FAMILY MEDICINE

## 2018-01-09 PROCEDURE — 97530 THERAPEUTIC ACTIVITIES: CPT

## 2018-01-09 PROCEDURE — 83735 ASSAY OF MAGNESIUM: CPT | Performed by: FAMILY MEDICINE

## 2018-01-09 PROCEDURE — 82962 GLUCOSE BLOOD TEST: CPT

## 2018-01-09 PROCEDURE — 84100 ASSAY OF PHOSPHORUS: CPT | Performed by: FAMILY MEDICINE

## 2018-01-09 PROCEDURE — 96375 TX/PRO/DX INJ NEW DRUG ADDON: CPT

## 2018-01-09 PROCEDURE — 96365 THER/PROPH/DIAG IV INF INIT: CPT

## 2018-01-09 PROCEDURE — 36415 COLL VENOUS BLD VENIPUNCTURE: CPT | Performed by: EMERGENCY MEDICINE

## 2018-01-09 PROCEDURE — 84443 ASSAY THYROID STIM HORMONE: CPT | Performed by: FAMILY MEDICINE

## 2018-01-09 PROCEDURE — 71045 X-RAY EXAM CHEST 1 VIEW: CPT

## 2018-01-09 PROCEDURE — 87040 BLOOD CULTURE FOR BACTERIA: CPT | Performed by: EMERGENCY MEDICINE

## 2018-01-09 PROCEDURE — 74011250637 HC RX REV CODE- 250/637: Performed by: NURSE PRACTITIONER

## 2018-01-09 PROCEDURE — 96366 THER/PROPH/DIAG IV INF ADDON: CPT

## 2018-01-09 RX ORDER — CARBIDOPA AND LEVODOPA 25; 250 MG/1; MG/1
1 TABLET ORAL 3 TIMES DAILY
Status: DISCONTINUED | OUTPATIENT
Start: 2018-01-09 | End: 2018-01-13 | Stop reason: HOSPADM

## 2018-01-09 RX ORDER — HYDRALAZINE HYDROCHLORIDE 20 MG/ML
5 INJECTION INTRAMUSCULAR; INTRAVENOUS ONCE
Status: COMPLETED | OUTPATIENT
Start: 2018-01-09 | End: 2018-01-09

## 2018-01-09 RX ORDER — MAGNESIUM SULFATE 100 %
4 CRYSTALS MISCELLANEOUS AS NEEDED
Status: DISCONTINUED | OUTPATIENT
Start: 2018-01-09 | End: 2018-01-13 | Stop reason: HOSPADM

## 2018-01-09 RX ORDER — CARBIDOPA AND LEVODOPA 25; 250 MG/1; MG/1
1 TABLET ORAL 3 TIMES DAILY
Status: DISCONTINUED | OUTPATIENT
Start: 2018-01-09 | End: 2018-01-09

## 2018-01-09 RX ORDER — SERTRALINE HYDROCHLORIDE 50 MG/1
50 TABLET, FILM COATED ORAL DAILY
Status: DISCONTINUED | OUTPATIENT
Start: 2018-01-09 | End: 2018-01-13 | Stop reason: HOSPADM

## 2018-01-09 RX ORDER — INSULIN LISPRO 100 [IU]/ML
INJECTION, SOLUTION INTRAVENOUS; SUBCUTANEOUS
Status: DISCONTINUED | OUTPATIENT
Start: 2018-01-09 | End: 2018-01-13 | Stop reason: HOSPADM

## 2018-01-09 RX ORDER — SODIUM CHLORIDE 0.9 % (FLUSH) 0.9 %
5-10 SYRINGE (ML) INJECTION EVERY 8 HOURS
Status: DISCONTINUED | OUTPATIENT
Start: 2018-01-09 | End: 2018-01-13 | Stop reason: HOSPADM

## 2018-01-09 RX ORDER — SODIUM CHLORIDE 9 MG/ML
100 INJECTION, SOLUTION INTRAVENOUS CONTINUOUS
Status: DISCONTINUED | OUTPATIENT
Start: 2018-01-09 | End: 2018-01-10

## 2018-01-09 RX ORDER — DEXTROSE 50 % IN WATER (D50W) INTRAVENOUS SYRINGE
12.5-25 AS NEEDED
Status: DISCONTINUED | OUTPATIENT
Start: 2018-01-09 | End: 2018-01-13 | Stop reason: HOSPADM

## 2018-01-09 RX ORDER — METOPROLOL TARTRATE 5 MG/5ML
1.25 INJECTION INTRAVENOUS ONCE
Status: COMPLETED | OUTPATIENT
Start: 2018-01-09 | End: 2018-01-09

## 2018-01-09 RX ORDER — SIMVASTATIN 20 MG/1
40 TABLET, FILM COATED ORAL
Status: DISCONTINUED | OUTPATIENT
Start: 2018-01-09 | End: 2018-01-13 | Stop reason: HOSPADM

## 2018-01-09 RX ORDER — LABETALOL 100 MG/1
300 TABLET, FILM COATED ORAL 2 TIMES DAILY
Status: DISCONTINUED | OUTPATIENT
Start: 2018-01-09 | End: 2018-01-13 | Stop reason: HOSPADM

## 2018-01-09 RX ORDER — POTASSIUM CHLORIDE 1.5 G/1.77G
20 POWDER, FOR SOLUTION ORAL
Status: COMPLETED | OUTPATIENT
Start: 2018-01-09 | End: 2018-01-09

## 2018-01-09 RX ORDER — SODIUM CHLORIDE 0.9 % (FLUSH) 0.9 %
5-10 SYRINGE (ML) INJECTION AS NEEDED
Status: DISCONTINUED | OUTPATIENT
Start: 2018-01-09 | End: 2018-01-13 | Stop reason: HOSPADM

## 2018-01-09 RX ADMIN — LABETALOL HCL 300 MG: 200 TABLET, FILM COATED ORAL at 10:31

## 2018-01-09 RX ADMIN — CARBIDOPA AND LEVODOPA 1 TABLET: 25; 250 TABLET ORAL at 16:00

## 2018-01-09 RX ADMIN — LABETALOL HCL 300 MG: 200 TABLET, FILM COATED ORAL at 18:00

## 2018-01-09 RX ADMIN — SIMVASTATIN 40 MG: 20 TABLET, FILM COATED ORAL at 02:57

## 2018-01-09 RX ADMIN — SODIUM CHLORIDE 100 ML/HR: 9 INJECTION, SOLUTION INTRAVENOUS at 15:02

## 2018-01-09 RX ADMIN — APIXABAN 2.5 MG: 2.5 TABLET, FILM COATED ORAL at 11:54

## 2018-01-09 RX ADMIN — CARBIDOPA AND LEVODOPA 1 TABLET: 25; 250 TABLET ORAL at 21:30

## 2018-01-09 RX ADMIN — CARBIDOPA AND LEVODOPA 1 TABLET: 25; 250 TABLET ORAL at 11:55

## 2018-01-09 RX ADMIN — POTASSIUM CHLORIDE 20 MEQ: 1.5 POWDER, FOR SOLUTION ORAL at 09:08

## 2018-01-09 RX ADMIN — Medication 10 ML: at 15:30

## 2018-01-09 RX ADMIN — SERTRALINE HYDROCHLORIDE 50 MG: 50 TABLET ORAL at 10:32

## 2018-01-09 RX ADMIN — CEFTRIAXONE SODIUM 1 G: 1 INJECTION, POWDER, FOR SOLUTION INTRAMUSCULAR; INTRAVENOUS at 21:18

## 2018-01-09 RX ADMIN — HYDRALAZINE HYDROCHLORIDE 5 MG: 20 INJECTION INTRAMUSCULAR; INTRAVENOUS at 09:05

## 2018-01-09 RX ADMIN — POTASSIUM CHLORIDE 20 MEQ: 1.5 POWDER, FOR SOLUTION ORAL at 11:54

## 2018-01-09 RX ADMIN — Medication 10 ML: at 21:22

## 2018-01-09 RX ADMIN — SODIUM CHLORIDE 1000 ML: 9 INJECTION, SOLUTION INTRAVENOUS at 00:05

## 2018-01-09 RX ADMIN — SIMVASTATIN 40 MG: 20 TABLET, FILM COATED ORAL at 21:30

## 2018-01-09 RX ADMIN — METOROPROLOL TARTRATE 1.25 MG: 5 INJECTION, SOLUTION INTRAVENOUS at 02:57

## 2018-01-09 RX ADMIN — SODIUM CHLORIDE 100 ML/HR: 9 INJECTION, SOLUTION INTRAVENOUS at 04:52

## 2018-01-09 RX ADMIN — APIXABAN 2.5 MG: 2.5 TABLET, FILM COATED ORAL at 18:00

## 2018-01-09 RX ADMIN — CEFTRIAXONE SODIUM 1 G: 1 INJECTION, POWDER, FOR SOLUTION INTRAMUSCULAR; INTRAVENOUS at 02:58

## 2018-01-09 NOTE — ED PROVIDER NOTES
HPI Comments: 80 y.o. male with past medical history significant for HTN, Orthostatic hypotension, Parkinson disease, DVT, DM who presents from home with chief complaint of speech difficulty. Per daughter, pt appear to have slurred speech around 1830 this evening. She states that his gait was also unsteady and BP 80/50. Daughter states that he typically has hypertension. While in ED, pt denies any pain. He is currently on Eliquis. Pt denies chest pain, SOB, vision change, numbness, headache. There are no other acute medical concerns at this time. Daughter notes that he last had physical therapy in August.  
 
PCP: Ludwig العراقي MD 
 
Note written by Noé Gallagher, as dictated by Franklin Mendoza MD 11:00 PM 
 
The history is provided by the patient and a relative (daughter). No  was used. Past Medical History:  
Diagnosis Date  Arthritis 5/5/2010  Diabetes mellitus type 2, diet-controlled ([address]  DVT (deep venous thrombosis) ([address] right LE DVT (mostly distal and non-occlusive) discovered 6/15  
 HTN (hypertension) 5/5/2010  
 hypertensive heart disease (LVH)  Hypercholesteremia 5/5/2010  in 4/09  Orthostatic hypotension Complaints of dizziness.  (lying) to 120 (standing) in 3/18/11.  Parkinson disease ([address] diagnosed around middle of may 2015 History reviewed. No pertinent surgical history. Family History:  
Problem Relation Age of Onset  Heart Disease Mother  Diabetes Mother  Cancer Mother  Heart Disease Father  Asthma Father Social History Social History  Marital status:  Spouse name: N/A  
 Number of children: N/A  
 Years of education: N/A Occupational History  Not on file. Social History Main Topics  Smoking status: Former Smoker Packs/day: 0.30 Years: 5.00 Types: Cigarettes, Pipe  Smokeless tobacco: Former User Types: Snuff  Alcohol use 0.0 oz/week  
  0 Standard drinks or equivalent per week Comment: very rarely  Drug use: No  
 Sexual activity: Not Currently Other Topics Concern  Not on file Social History Narrative Lives with daughter, Reshma Ch ALLERGIES: Review of patient's allergies indicates no known allergies. Review of Systems Constitutional: Negative for appetite change and fever. HENT: Negative for congestion, nosebleeds and sore throat. Eyes: Negative for discharge. Respiratory: Negative for cough and shortness of breath. Cardiovascular: Negative for chest pain. Gastrointestinal: Negative for abdominal pain, diarrhea, nausea and vomiting. Genitourinary: Negative for dysuria. Musculoskeletal: Positive for gait problem (unsteady). Skin: Negative for rash. Neurological: Positive for speech difficulty. Negative for weakness and headaches. Hematological: Negative for adenopathy. Psychiatric/Behavioral: Negative. All other systems reviewed and are negative. Vitals:  
 01/08/18 2312 01/08/18 2349 01/08/18 2352 01/08/18 2354 BP: (!) 185/94 198/89 151/83 95/56 Pulse: 78 78 79 81 Resp: 15 15 17 20 Temp:      
SpO2:  100% Physical Exam  
Constitutional: He is oriented to person, place, and time. He appears well-developed and well-nourished. HENT:  
Head: Normocephalic and atraumatic. Mouth/Throat: Oropharynx is clear and moist.  
Eyes: Conjunctivae are normal.  
Neck: Normal range of motion. Neck supple. Cardiovascular: Normal rate, regular rhythm and normal heart sounds. Pulmonary/Chest: Effort normal and breath sounds normal.  
Abdominal: Soft. Bowel sounds are normal. There is no tenderness. Musculoskeletal: Normal range of motion. He exhibits no edema or tenderness. Rigid Neurological: He is alert and oriented to person, place, and time. Slow to perform finger to nose on left greater than right Skin: Skin is warm and dry. Psychiatric: He has a normal mood and affect. His behavior is normal.  
Nursing note and vitals reviewed. Note written by Noé Krueger, as dictated by Bennie Espana MD 11:28 PM 
 
[facility] 
ED Course Procedures ED EKG interpretation: 
Rhythm: normal sinus rhythm; and regular . Rate (approx.): 71; Axis: normal; P wave: normal; QRS interval: normal ; ST/T wave: non-specific changes;. This EKG was interpreted by Bennie Epsana MD,ED Provider. CONSULT: 
Family Practice - will admit A/P: 
1. ARF 2. Elevated troponin - no chest pain. May related to acute renal failure 3. Orthostatic hypotension - IVF given. 4. UTI - cultures and lactate per request of family practice. Rocephin given. 5. ? TIA - slurred speech and unsteady gait PTA.

## 2018-01-09 NOTE — ED NOTES
Bedside shift change report given to Alberto Valdez (oncoming nurse) by Nidia Thornton RN (offgoing nurse). Report included the following information Kardex and ED Summary.

## 2018-01-09 NOTE — ED NOTES
Pt sitting up in bed, side rails up x 2. Pt eating crackers and drinking diet ginger ale, watching TV.

## 2018-01-09 NOTE — PROGRESS NOTES
[facility]: MED RECONCILIATION Comments/Recommendations:  
 Patient is awake and alert but is not knowledgeable about his home medications  Patient arrived last evening via EMS with receipt from [address]  The paperwork from [facility] indicates the following 
o Carbidopa-levodopa 25/250 one tablet three times daily filled 1/3/18 the patient received 45 pills for a 15 day supply 
o Furosemide 40 mg once daily filled 1/3/18 the patient received 30 pills for a 30 day supply 
o Losartan 50 mg once daily filled 1/3/18 the patient received 30 pills for a 30 day supply 
o Labetalol 300 mg one tablet twice daily filled 1/3/18 the patient received 60 pills for a 30 day supply [facility] and received the following information: 
o Apixaban 2.5 mg twice daily prescription written 10/19/17 is on hold and has never been filled 
o Sertraline 50 mg once daily was last filled 11/24/17 for a 30 day supply 
o Simvastatin 40 mg nightly was last filled 9/27/17 for a 30 day supply  Pharmacist called the patient's daughter Beatriz Short with no answer and left a message requesting call back.  Nurse notified Allergies: Review of patient's allergies indicates no known allergies. Prior to Admission Medications:  
 
Prior to Admission Medications Prescriptions Last Dose Informant Patient Reported? Taking? apixaban (ELIQUIS) 2.5 mg tablet 1/8/2018 at Unknown time  No Yes Sig: TAKE ONE TABLET BY MOUTH TWICE DAILY  
carbidopa-levodopa (SINEMET)  mg per tablet 1/8/2018 at Unknown time  No Yes Sig: TAKE ONE TABLET BY MOUTH THREE TIMES DAILY  
furosemide (LASIX) 40 mg tablet 1/8/2018 at Unknown time  No Yes Sig: Take 1 Tab by mouth daily. labetalol (NORMODYNE) 300 mg tablet 1/8/2018 at Unknown time  No Yes Sig: TAKE ONE TABLET BY MOUTH TWICE DAILY losartan (COZAAR) 50 mg tablet 1/8/2018 at Unknown time  No Yes Sig: Take 1 Tab by mouth daily.   
sertraline (ZOLOFT) 50 mg tablet 1/7/2018 at Unknown time  No Yes Sig: TAKE ONE TABLET BY MOUTH ONCE DAILY FOR ANXIETY  
simvastatin (ZOCOR) 40 mg tablet 1/7/2018 at Unknown time  No Yes Sig: Take 1 Tab by mouth nightly. therapeutic multivitamin (THERAGRAN) tablet 1/8/2018 at Unknown time Child Yes Yes Sig: Take 1 Tab by mouth daily. Facility-Administered Medications: None Thank you, Gamaliel Rivera, PharmD, BCPS

## 2018-01-09 NOTE — CONSULTS
[facility]  [address] [place], [place]  [phone]     Inpatient Neurology Consult  Kathi Askew, [facility]    Name:   Nawaf Chang record #: [id number]  Admission Date: 1/8/2018  Consult Date:  01/09/18    Referring Provider: Dr. Judi Jones  Chief Complaint:  AMS  Source of Hx:  Chart review, pt          NEUROLOGY NOTE ADDENDUM:    January 9, 2018    I have reviewed the documentation provided by the nurse practitioner, discussed her findings, clinical impression, and the proposed management plans with regards to this encounter. I have personally evaluated the patient and verified the history and confirmed the physical findings. Below are my additional findings:    Patient is poor historian. He says he was getting weaker (not sure over how long) so decided to go to see his PCP yesterday. I called daughter to get history but unable to talk with her. Hx is from chart review. 80 y.o. male with hx of Orthostatic Hypotension, DVT (on eliquis), HTN, HLD, DMT2, Parkinson's (, one tab TID) who was brought into ER last PM for chief complaint of slurred speech around 630 PM, noted by daughter with whom he lives. Daughter also reported his gait was unsteady and BP was 80/ 50. CT head done (images reviewed): moderate to severe chronic small vessel ischemic changes, no intracranial bleeding, no evidence of recent stroke. UA done during admission was + for UTI. Pt says he recalls thinking he might have a UTI since urine smelled bad, he's not sure how long, says his memory isn't as good as it used to be. Exam:  Awake, conversant, appears weak, follows commands. CNs: EOMI, face symmetric, facial sensation intact, shrug symmetric, hearing/ language normal.  Motor: strength x 4 exts, normal vibration x 4 exts, normal cerebellar exam.    Impression / Plan:    80 y.o. male with hx of orthostatic hypotension, parkinson's, self-reported memory loss.   Admitted for episode slurred speech, unsteady gait with BP 80/ 50 and found to have UTI. Possible that he had TIA but more likely speech changes and generalized weakness were due to UTI and hypotension. NP Ulysses Revering considered utility of Brain MRI and MRA and didn't feel that they would alter his management, which I agree with, given alternate etiologies. Will however get carotid dopplers to ensure no significant stenosis in ICAs that would suggest symptoms were due to TIA. Will f/u tomorrow. Thank you for the consultation. Signed By: Dre Anna MD     January 9, 2018              _____________________________________________________________________    HISTORY OF PRESENT ILLNESS:   Stephen Charles is a 80 y.o. male with PMH of tobacco use hx, Parkinson's syndrome, former tobacco use, CHF, orthostatic hypotension (noted in ED), DVT hx on Eliquis, HTN, HLD, DMT2. The Neurology Service is asked to evaluate for potential dysarthria, after admission for weakness and + UTI per labs. He describes coming to the hospital for weakness. Attempted to call pt daughter Rosa Casillas for more information, without success. Per chart review, patient lives with daughter and around 200 last evening daughter felt patient was having slurred speech and she checked his BP which was 80/50. Daughter reported to ED he normally has high blood pressure. She also noted when he tried to stand yesterday he was more unsteady on the left. Pt cannot report if he has been taking his medications regularly and eating/drinking an appropriate amount. Past Medical History:   Diagnosis Date    Arthritis 5/5/2010    Diabetes mellitus type 2, diet-controlled ([facility]     DVT (deep venous thrombosis) (HCC)     right LE DVT (mostly distal and non-occlusive) discovered 6/15    HTN (hypertension) 5/5/2010    hypertensive heart disease ([facility])    Hypercholesteremia 5/5/2010     in 4/09    Orthostatic hypotension     Complaints of dizziness.    (lying) to 120 (standing) in 3/18/11.  Parkinson disease ([address]     diagnosed around middle of may 2015     History reviewed. No pertinent surgical history. Family History   Problem Relation Age of Onset    Heart Disease Mother     Diabetes Mother     Cancer Mother     Heart Disease Father     Asthma Father      Social History     Social History    Marital status:      Spouse name: N/A    Number of children: N/A    Years of education: N/A     Occupational History    Not on file. Social History Main Topics    Smoking status: Former Smoker     Packs/day: 0.30     Years: 5.00     Types: Cigarettes, Pipe    Smokeless tobacco: Former User     Types: Snuff    Alcohol use 0.0 oz/week     0 Standard drinks or equivalent per week      Comment: very rarely    Drug use: No    Sexual activity: Not Currently     Other Topics Concern    Not on file     Social History Narrative    Lives with daughterKeturah       Objective  Allergies:   No Known Allergies  Outpatient Meds  No current facility-administered medications on file prior to encounter. Current Outpatient Prescriptions on File Prior to Encounter   Medication Sig Dispense Refill    labetalol (NORMODYNE) 300 mg tablet TAKE ONE TABLET BY MOUTH TWICE DAILY 60 Tab 5    losartan (COZAAR) 50 mg tablet Take 1 Tab by mouth daily. 30 Tab 5    furosemide (LASIX) 40 mg tablet Take 1 Tab by mouth daily. 30 Tab 5    sertraline (ZOLOFT) 50 mg tablet TAKE ONE TABLET BY MOUTH ONCE DAILY FOR ANXIETY 30 Tab 5    simvastatin (ZOCOR) 40 mg tablet Take 1 Tab by mouth nightly. 30 Tab 5    apixaban (ELIQUIS) 2.5 mg tablet TAKE ONE TABLET BY MOUTH TWICE DAILY 60 Tab 12    carbidopa-levodopa (SINEMET)  mg per tablet TAKE ONE TABLET BY MOUTH THREE TIMES DAILY 90 Tab 3    therapeutic multivitamin (THERAGRAN) tablet Take 1 Tab by mouth daily.          Inpatient Meds    Current Facility-Administered Medications:     apixaban (ELIQUIS) tablet 2.5 mg, 2.5 mg, Oral, BID, Dex Hanks MD, 2.5 mg at 01/09/18 1800    labetalol (NORMODYNE) tablet 300 mg, 300 mg, Oral, BID, Dex Hanks MD, 300 mg at 01/09/18 1800    simvastatin (ZOCOR) tablet 40 mg, 40 mg, Oral, QHS, Dex Hanks MD, 40 mg at 01/09/18 0257    sertraline (ZOLOFT) tablet 50 mg, 50 mg, Oral, DAILY, Dex Hanks MD, 50 mg at 01/09/18 1032    sodium chloride (NS) flush 5-10 mL, 5-10 mL, IntraVENous, Q8H, Dex Hanks MD, 10 mL at 01/09/18 1530    sodium chloride (NS) flush 5-10 mL, 5-10 mL, IntraVENous, PRN, Dex Hanks MD    0.9% sodium chloride infusion, 100 mL/hr, IntraVENous, CONTINUOUS, Marian Jesus MD, Last Rate: 100 mL/hr at 01/09/18 1502, 100 mL/hr at 01/09/18 1502    glucose chewable tablet 16 g, 4 Tab, Oral, PRN, Marian Jesus MD    dextrose (D50W) injection syrg 12.5-25 g, 12.5-25 g, IntraVENous, PRN, Marian Jesus MD    glucagon (GLUCAGEN) injection 1 mg, 1 mg, IntraMUSCular, PRN, Marian Jesus MD    insulin lispro (HUMALOG) injection, , SubCUTAneous, AC&HS, Marian Jesus MD, Stopped at 01/09/18 0730    cefTRIAXone (ROCEPHIN) 1 g in 0.9% sodium chloride (MBP/ADV) 50 mL, 1 g, IntraVENous, Q24H, Marian Jesus MD    carbidopa-levodopa (SINEMET)  mg per tablet 1 Tab, 1 Tab, Oral, TID, Tiffanie Pérez NP, 1 Tab at 01/09/18 1600    Current Outpatient Prescriptions:     labetalol (NORMODYNE) 300 mg tablet, TAKE ONE TABLET BY MOUTH TWICE DAILY, Disp: 60 Tab, Rfl: 5    losartan (COZAAR) 50 mg tablet, Take 1 Tab by mouth daily. , Disp: 30 Tab, Rfl: 5    furosemide (LASIX) 40 mg tablet, Take 1 Tab by mouth daily. , Disp: 30 Tab, Rfl: 5    sertraline (ZOLOFT) 50 mg tablet, TAKE ONE TABLET BY MOUTH ONCE DAILY FOR ANXIETY, Disp: 30 Tab, Rfl: 5    simvastatin (ZOCOR) 40 mg tablet, Take 1 Tab by mouth nightly., Disp: 30 Tab, Rfl: 5    apixaban (ELIQUIS) 2.5 mg tablet, TAKE ONE TABLET BY MOUTH TWICE DAILY, Disp: 60 Tab, Rfl: 12    carbidopa-levodopa (SINEMET)  mg per tablet, TAKE ONE TABLET BY MOUTH THREE TIMES DAILY, Disp: 90 Tab, Rfl: 3    therapeutic multivitamin (THERAGRAN) tablet, Take 1 Tab by mouth daily. , Disp: , Rfl:     PHYSICAL EXAM  Patient Vitals for the past 12 hrs:   Temp Pulse Resp BP SpO2   01/09/18 1800 - 81 21 149/70 -   01/09/18 1705 - 76 18 - 100 %   01/09/18 1600 - 71 14 172/86 -   01/09/18 1540 - 71 15 157/90 100 %   01/09/18 1440 - 71 14 142/71 -   01/09/18 1420 - 72 15 143/75 -   01/09/18 1340 - 77 15 129/62 -   01/09/18 1330 - 81 15 - 100 %   01/09/18 1221 - 80 - 159/89 100 %   01/09/18 1140 98.4 °F (36.9 °C) 80 14 (!) 171/91 100 %   01/09/18 1100 - 78 13 (!) 167/92 -   01/09/18 1000 - 83 13 170/83 100 %   01/09/18 0921 - 83 12 - 100 %   01/09/18 0920 - 83 14 168/71 -   01/09/18 0905 - 90 - (!) 208/100 -      General: elderly A[facility] in [facility], providing reduced history    Psych: Affect is calm, cooperative    Neck: supple, nontender,  No bruit   Heart: regular rhythm and rate     Lungs: clear BBS   Extremities: no LE edema Skin:  no  rashes      Neurological Examination:    Mental Status:  Awake, oriented x 2, reduced insight and judgement  Commands: following    Language:  Comprehension: reduced      Speech: no dysarthria or aphasia     Cranial Nerves:            I: smell   Not tested    II: visual acuity    deferred    II: visual fields   Full to confrontation    II: pupils   Equal, round, reactive to light    II: optic disc   Not examined    III,VII:   no ptosis of either eyelid    III,IV,VI: extraocular muscles    Full EOM, no nystagmus, no intranuclear opthalmoplegia    V: mastication   symmetrical    V: facial sensation:    Equal V1, V2 and V3 bilaterally with LT    VII: facial muscle function     Symmetric, no facial droop    VIII: hearing   Equal bilaterally    IX: soft palate elevation    Uvula midline, elevates symetrically    XI: trapezius strength    5/5    XI: sternocleidomastoid strength   5/5 XI: neck flexion strength    Very reduced bilaterally    XII: tongue    Protrudes midline, no fasciculations or atrophy      Strength/Motor   Left:   Proximal:  5- /5   Distal:  5/5       Right:  Proximal:  5/5   Distal:   5/5    Drift:       None             Bulk:  appears symmetric         Tone:  normal      Reflexes:    BR Brachial Patellar Achilles Babinski Startle Glabellar   L 2+/4 2+/4 2+/4 NT  downgoing NT NT   R 2+/4 2+/4 2+/4 NT downgoing NT NT      Sensory: intact on proximal & distal extremity w/ LT, pressure, temp bilaterally   Coordination: FNF: Intact bilaterally but slower    Heel to shin: pt unable to perform with rigidity   Tremors:  no resting tremors, bilateral proximal/distal intention tremors > on L   --rigidity all 4 ext > on left   -- bradykinesia-- bilaterally > on left   --bradyphrenia-- noted   --- masked facies--- mild   Gait: deferred    Labs Reviewed  Recent Results (from the past 12 hour(s))   MAGNESIUM    Collection Time: 01/09/18 10:12 AM   Result Value Ref Range    Magnesium 1.8 1.6 - 2.4 mg/dL   PHOSPHORUS    Collection Time: 01/09/18 10:12 AM   Result Value Ref Range    Phosphorus 3.0 2.6 - 4.7 MG/DL   TROPONIN I    Collection Time: 01/09/18 10:12 AM   Result Value Ref Range    Troponin-I, Qt. 0.26 (H) <0.05 ng/mL   GLUCOSE, POC    Collection Time: 01/09/18 11:39 AM   Result Value Ref Range    Glucose (POC) 95 65 - 100 mg/dL    Performed by Preston Pitts (PCT)    GLUCOSE, POC    Collection Time: 01/09/18  5:03 PM   Result Value Ref Range    Glucose (POC) 155 (H) 65 - 100 mg/dL    Performed by Min Vincent April      Imaging  Reviewed:   CT Results (recent):    Results from Hospital Encounter encounter on 01/08/18   CT HEAD WO CONT   Narrative EXAM:  CT HEAD WO CONT    INDICATION: Altered mental status. COMPARISON: CT head 6/26/2011    TECHNIQUE: Axial noncontrast head CT from foramen magnum to vertex. Coronal and  sagittal reformatted images were obtained.  CT dose reduction was achieved  through use of a standardized protocol tailored for this examination and  automatic exposure control for dose modulation. FINDINGS:  There is diffuse age-related parenchymal volume loss. The ventricles  and sulci are age-appropriate without hydrocephalus. There is no mass effect or  midline shift. There is no intracranial hemorrhage or extra-axial fluid  collection. Areas of low attenuation in the periventricular white matter  represent progressive age-indeterminate microvascular ischemic changes. The  gray-white matter differentiation is maintained. The basal cisterns are patent. The osseous structures are intact. There is complete opacification of the left  frontal sinus and patchy opacification in the left greater than right ethmoid  air cells. The remaining visualized paranasal sinuses and mastoid air cells are  clear. Impression IMPRESSION:   1. There is no acute intracranial hemorrhage. 2. Progressive age-indeterminate microvascular ischemic changes in the  periventricular white matter. 3. Left frontal and ethmoid paranasal sinus inflammatory changes. MRI Results (recent):    Results from [place] encounter on 04/26/09   MRI BRAIN W AND W/O CONT   Narrative **Final Report**  ICD Codes / Adm. Diagnosis:    /   DIZZINESS  Examination:  BRAIN MRI Cleo Amaya CONTRAST  - [id number] - Apr 26 2009  4:12PM    Accession No:  [id number]  Reason:      REPORT:  CLINICAL INDICATION: Dizziness. Technical factors: Diffusion imaging, sagittal T1-weighted axial T1-weighted   pre- and post-20 cc IV Magnevist T2-weighted FLAIR gradient-echo coronal   T1-weighted postcontrast sagittal FLAIR. No prior. There is mild atrophy and white matter disease finding which is nonspecific   but likely represent small vessel ischemia. Diffusion imaging does not show   acute ischemic changes. There is no hemorrhage extra-axial fluid collection   shift or mass effect.  There is no enhancing lesion or masses. IMPRESSION: Atrophy and white matter disease. No other significant findings. Interpreting/Reading Doctor: Mazin Marquez ([id number])  Transcribed: n/a on 04/26/2009  Approved: Mazin Marquez ([id number])  04/26/2009          Distribution:  Attending Doctor: Leydi Ac Doctor: Gila Bolanos          _____________________________________________________________________    Review of Systems: 10 point ROS was performed. Pertinent positives listed in HPI. Denies:  angina, palpitations, paresthesias, vision loss, slurred speech, aphasia, confusion, fever, chills, falls, headache, diplopia, back pain, prior episodes of vertigo, hallucinations, new medications or dosage changes. _____________________________________________________________________  Impression  80 y.o. male with onset of gait disturbance with leaning to the left when standing, potential slurred speech and weakness x24hrs. Exam findings of AAM with bradyphrenia, dry mouth that sounds like dysarthria, rigidity bilaterally > on L, postural tremors > on L, action tremors > on L and facial masking mild. Imaging reviewed: CT head without acute findings. Notable Labs: UA noting UTI, elevated Cr 2.08 on ADM, elevated pro-BNP, slightly elevated troponin. With UTI, acute renal insufficiency and PD hx with > symptoms on L, patient has notable reasons for chronic medical symptoms to increase in severity with illness. Do not feel he had TIA or acute CVA, will defer MRI. Refer to plan below. Assessment:  1. Encephalopathy:  UTI, acute renal insufficiency and increased Parkinson's symptoms  2. Parkinson's Syndrome  3.  orthostatic hypotension--hx and while in ED    Plan  · Medications:  Restart Sinemet 25/250mg AC meals that was on PTA med list  · Testing:  Do not feel MRI or carotids are needed for potential TIA  · Recommendations:  PT cx    ·   Continue great care by collaborating care team and nursing staff.   · Testing discussed with patient --- any questions answered. My collaborating care team physician may have further recommendations. On DVT Prophylaxis yes no   Continue Eliquis while inpatient x      Care Plan discussed with:  Patient x   Family:  Marcus na- [phone]    RN x   Care Manager    Consultant/Specialist:     Patient will be discussed with Dr. Mohinder Butler  ______________________________________________________________  Hospital Problems  Date Reviewed: 1/9/2018          Codes Class Noted POA    UTI (urinary tract infection) ICD-10-CM: N39.0  ICD-9-CM: 599.0  1/9/2018 Yes        Altered mental status ICD-10-CM: R41.82  ICD-9-CM: 780.97  1/9/2018 Yes        Elevated troponin ICD-10-CM: R74.8  ICD-9-CM: 790.6  1/9/2018 Yes        History of DVT (deep vein thrombosis) ICD-10-CM: Z86.718  ICD-9-CM: V12.51  4/11/2017 Yes        Diastolic dysfunction with chronic heart failure ([facility] ICD-10-CM: I50.32  ICD-9-CM: 428.32  12/21/2015 Yes        Accelerated hypertension ICD-10-CM: I10  ICD-9-CM: 401.0  12/18/2015 Yes        Parkinsons disease ([facility] ICD-10-CM: Dorthey Litter  ICD-9-CM: 332.0  5/26/2015 Yes        Diabetes mellitus type II, controlled ([facility] ICD-10-CM: E11.9  ICD-9-CM: 250.00  1/26/2015 Yes        Orthostatic hypotension (Chronic) ICD-10-CM: I95.1  ICD-9-CM: 458.0  Unknown Yes    Overview Signed 5/12/2011  2:55 PM by Obie Rodriguez MD     Complaints of dizziness.  (lying) to 120 (standing) in 3/18/11. HTN (hypertension) ICD-10-CM: I10  ICD-9-CM: 401.9  3/19/2011 Yes    Overview Signed 5/23/2011  8:08 AM by Kait Domingo MD     ECHO 5/2011 = LVH, EF 50%             Hypotension, postural ICD-10-CM: I95.1  ICD-9-CM: 458.0  3/19/2011 Yes    Overview Signed 7/6/2011  5:47 PM by Kait Domingo MD     Echo 2d adult   5/19/11   mild-mod LVH, EF 50-55% (low normal).  Mod LAE, mild MR.      Nm cardiac spect w stress / rest mult   5/19/11   no inducible ischemia; LVEF 45%              TIA (transient ischemic attack) ICD-10-CM: G45.9  ICD-9-CM: 435.9  5/5/2010 Yes              *Thank you for allowing [facility], to participate in the care of your patient.     ---Buyuly Medic, ACNP  ================================================    ADDENDUM--> Collaborating Care Team Physician:

## 2018-01-09 NOTE — ED NOTES
Patient helped up to [facility] with assistance at this time. Noted with brown large amount of soft stool in BSC. Patient tolerated standing with help at this time, no orthostatics noted.

## 2018-01-09 NOTE — ED NOTES
Patient stood at this time to attempt urine specimen. Able to obtain. States dizzy upon standing. B/P at 139/73 upon standing.

## 2018-01-09 NOTE — H&P
[address] [facility]  
Admission H&P Date of admission: 1/8/2018 Patient name: Rico Lr MRN: [id number] YOB: 1935 Age: 80 y.o. Primary care provider:  Opal Savage MD  
 
Source of Information: patient, medical records Chief complaint:  AMS History of Present Illness Rico Lr is a 80 y.o. male with a h/o HTN, TIA, DM, dCHF, h/o DVT on Eliquis, Parkinson disease, and postural hypotension who presents with family to the ER complaining of slurred speech and altered MS. Family reports that they noticed an unsteady gait and slurred speech around 1830 on the night of admission. Also during that time, daughter states that pt was less interactive, almost nonverbal and became so dizzy that he was leaning to the left and had to be guided down to a seated position. Pt reports that he felt like he was a little weak and had a difficult time standing. When his daughter took his BP it was 86/50 which she found odd since he is normally hypertensive. Pt endorses some constipation, urine urgency, and chronic cough. Pt denies CP, fever, changes in oral intake, dysuria, abdominal pain, vision changes, unilateral weakness, or headache. PT's family states that pt is back to baseline mentation. In the ER, vital signs were remarkable for /81. Labs were remarkable for Cr 2.08, glucose 54, GFR 37, trop 0.20, LA 1.6, and NT-Pro BNP 3407. CT of head showed no intracranial process but there were signs of microvascular infarcts of indeterminate age. CXR showed no acute process. UA was remarkable for 4+ bacteria, >100WBC with WBC casts, moderate LE, and few epithelial cells. Pt was treated with 1L NS bolus and was given 1 dose of rocephin. Blood and urine cx were obtained. Home Medications Prior to Admission medications Medication Sig Start Date End Date Taking?  Authorizing Provider  
labetalol (NORMODYNE) 300 mg tablet TAKE ONE TABLET BY MOUTH TWICE DAILY 10/19/17  Yes Nubia Go MD  
losartan (COZAAR) 50 mg tablet Take 1 Tab by mouth daily. 10/19/17  Yes Nubia Go MD  
furosemide (LASIX) 40 mg tablet Take 1 Tab by mouth daily. 10/19/17  Yes Nubia Go MD  
sertraline (ZOLOFT) 50 mg tablet TAKE ONE TABLET BY MOUTH ONCE DAILY FOR ANXIETY 10/19/17  Yes Nubia Go MD  
simvastatin (ZOCOR) 40 mg tablet Take 1 Tab by mouth nightly. 10/19/17  Yes Elizabeth Hopper MD  
apixaban (ELIQUIS) 2.5 mg tablet TAKE ONE TABLET BY MOUTH TWICE DAILY 10/19/17  Yes Nubia Go MD  
carbidopa-levodopa (SINEMET)  mg per tablet TAKE ONE TABLET BY MOUTH THREE TIMES DAILY 10/19/17  Yes Nubia Go MD  
therapeutic multivitamin (THERAGRAN) tablet Take 1 Tab by mouth daily. Yes Historical Provider Allergies No Known Allergies Past Medical History:  
Diagnosis Date  Arthritis 5/5/2010  Diabetes mellitus type 2, diet-controlled ([facility] [address]  DVT (deep venous thrombosis) ([facility] [address] right LE DVT (mostly distal and non-occlusive) discovered 6/15  
 HTN (hypertension) 5/5/2010  
 hypertensive heart disease (LVH)  Hypercholesteremia 5/5/2010  in 4/09  Orthostatic hypotension Complaints of dizziness.  (lying) to 120 (standing) in 3/18/11.  Parkinson disease ([facility] [address] diagnosed around middle of may 2015 History reviewed. No pertinent surgical history. Family History Problem Relation Age of Onset  Heart Disease Mother  Diabetes Mother  Cancer Mother  Heart Disease Father  Asthma Father Social History Patient resides Independently X  With family care Assisted living SNF Ambulates Independently X  With cane X  Assisted walker Alcohol history None X  Social (last drink new year's) Chronic Smoking history None X  Former smoker (quit 30 years ago) Current smoker History Smoking Status  Former Smoker  Packs/day: 0.30  Years: 5.00  Types: Cigarettes, Pipe Smokeless Tobacco  
 Former User  Types: Snuff Drug history X  None Former drug user Current drug user Code status X  Full code DNR/DNI Partial   
Code status discussed with the patient/caregivers. Review of Systems See HPI Physical Exam 
Visit Vitals  BP 95/56 (BP 1 Location: Left arm, BP Patient Position: Standing) Comment (BP Patient Position): Paitent c/o lightheadedness and nausea when standing.  Pulse 81  Temp 98.7 °F (37.1 °C)  Resp 20  SpO2 100% General: No acute distress. Alert. Cooperative. Slow to response. Head: Normocephalic. Atraumatic. Eyes:  Conjunctiva pink. Sclera white. PERRL. Nose:  Septum midline. Mucosa pink. No drainage. Throat: Mucosa pink. Moist mucous membranes. No tonsillar exudates or erythema. Palate movement equal bilaterally. Neck: Supple. Normal ROM. No stiffness. Respiratory: CTAB. No w/r/r/c. Fair air movement, distant air sounds. Cardiovascular: RRR. Normal S1,S2. No m/r/g. Pulses 2+ throughout. GI: + bowel sounds. Nontender. No rebound tenderness or guarding. Nondistended. Extremities: No edema. No palpable cord. No tenderness. Rigid motion in upper extremities. Musculoskeletal: Full ROM in all extremities. Neuro: CN II-XII grossly intact. Strength 5/5 in all extremities. Sensation intact in all extremities. A&O X3. Coordination intact for finger-to-nose. Skin: Clear. No rashes. No ulcers. Laboratory Data Recent Results (from the past 24 hour(s)) CBC WITH AUTOMATED DIFF Collection Time: 01/08/18 10:54 PM  
Result Value Ref Range WBC 4.4 4.1 - 11.1 K/uL  
 RBC 4.26 4.10 - 5.70 M/uL  
 HGB 12.9 12.1 - 17.0 g/dL HCT 40.2 36.6 - 50.3 % MCV 94.4 80.0 - 99.0 FL  
 MCH 30.3 26.0 - 34.0 PG  
 MCHC 32.1 30.0 - 36.5 g/dL  
 RDW 14.5 11.5 - 14.5 % PLATELET 584 725 - 037 K/uL  NEUTROPHILS 52 32 - 75 % LYMPHOCYTES 31 12 - 49 % MONOCYTES 12 5 - 13 % EOSINOPHILS 4 0 - 7 % BASOPHILS 1 0 - 1 %  
 ABS. NEUTROPHILS 2.3 1.8 - 8.0 K/UL  
 ABS. LYMPHOCYTES 1.4 0.8 - 3.5 K/UL  
 ABS. MONOCYTES 0.5 0.0 - 1.0 K/UL  
 ABS. EOSINOPHILS 0.2 0.0 - 0.4 K/UL  
 ABS. BASOPHILS 0.0 0.0 - 0.1 K/UL METABOLIC PANEL, COMPREHENSIVE Collection Time: 01/08/18 10:54 PM  
Result Value Ref Range Sodium 143 136 - 145 mmol/L Potassium 4.1 3.5 - 5.1 mmol/L Chloride 102 97 - 108 mmol/L  
 CO2 30 21 - 32 mmol/L Anion gap 11 5 - 15 mmol/L Glucose 54 (L) 65 - 100 mg/dL BUN 32 (H) 6 - 20 MG/DL Creatinine 2.08 (H) 0.70 - 1.30 MG/DL  
 BUN/Creatinine ratio 15 12 - 20 GFR est AA 37 (L) >60 ml/min/1.73m2 GFR est non-AA 31 (L) >60 ml/min/1.73m2 Calcium 9.5 8.5 - 10.1 MG/DL Bilirubin, total 0.5 0.2 - 1.0 MG/DL  
 ALT (SGPT) 10 (L) 12 - 78 U/L  
 AST (SGOT) 25 15 - 37 U/L Alk. phosphatase 88 45 - 117 U/L Protein, total 7.8 6.4 - 8.2 g/dL Albumin 4.0 3.5 - 5.0 g/dL Globulin 3.8 2.0 - 4.0 g/dL A-G Ratio 1.1 1.1 - 2.2    
TROPONIN I Collection Time: 01/08/18 10:54 PM  
Result Value Ref Range Troponin-I, Qt. 0.20 (H) <0.05 ng/mL NT-PRO BNP Collection Time: 01/08/18 10:54 PM  
Result Value Ref Range NT pro-BNP 3407 (H) 0 - 450 PG/ML  
EKG, 12 LEAD, INITIAL Collection Time: 01/08/18 10:56 PM  
Result Value Ref Range Ventricular Rate 71 BPM  
 Atrial Rate 71 BPM  
 P-R Interval 150 ms QRS Duration 92 ms Q-T Interval 456 ms  
 QTC Calculation (Bezet) 495 ms Calculated P Axis 55 degrees Calculated R Axis -5 degrees Calculated T Axis 174 degrees Diagnosis Normal sinus rhythm Moderate voltage criteria for LVH, may be normal variant T wave abnormality, consider lateral ischemia Prolonged QT Abnormal ECG When compared with ECG of 07-APR-2017 19:05, Nonspecific T wave abnormality, worse in Inferior leads URINALYSIS W/ RFLX MICROSCOPIC  
 Collection Time: 01/09/18  1:21 AM  
Result Value Ref Range Color YELLOW/STRAW Appearance CLOUDY (A) CLEAR Specific gravity 1.015 1.003 - 1.030    
 pH (UA) 6.5 5.0 - 8.0 Protein 30 (A) NEG mg/dL Glucose NEGATIVE  NEG mg/dL Ketone NEGATIVE  NEG mg/dL Bilirubin NEGATIVE  NEG Blood NEGATIVE  NEG Urobilinogen 0.2 0.2 - 1.0 EU/dL Nitrites NEGATIVE  NEG Leukocyte Esterase MODERATE (A) NEG    
 WBC >100 (H) 0 - 4 /hpf  
 RBC 0-5 0 - 5 /hpf Epithelial cells FEW FEW /lpf Bacteria 4+ (A) NEG /hpf WBC cast 0-2 (A) NEG /lpf Imaging CT head without contrast 
IMPRESSION:  
1. There is no acute intracranial hemorrhage. 2. Progressive age-indeterminate microvascular ischemic changes in the 
periventricular white matter. 3. Left frontal and ethmoid paranasal sinus inflammatory changes. CXR IMPRESSION: No acute process. Stable exam. 
 
EKG:  NSR with nonspecific t-wave abnormalities and Qtc prolongation, o significant change when compared to ECG on 4/7/17. Assessment and Plan Cassandra Tran is a 80 y.o. male who is admitted for AMS found to have UTI. Altered mental status in pt with h/o TIA: Multifactorial, however, DDx includes TIA/CVA vs Infectious vs hypothyroism vs worsening Parkinson disease. CT of head showed no acute intracranial process, but showed microvascular infarcts of indeterminate age. ABCD2 tool to assess risk of CVA in following 2 days following TIA is high risk when age, BP, symptoms, and co-morbidities taken into consideration. Urine evident for signs of infection which could be contributing. TSH was normal at 2.87 back on 4/1/15. Patient has orthostatic BP at baseline, orthostatic BP in ED shows inadequate response of BP to positional changes. Per family, mentation returned to baseline at time of admission. 
- Admit to telemetry - Vital signs per unit protocol - IV hydration with encouraged PO intake - Daily CBC/BMP  
- Follow up urine cx 
- Fall precautions, Ambulate with assistance 
- f/u repeat TSH 
- MRI with carotid dopplers - Nurse driven delirium protocols (blinds up in day, minimal noise or disturbances at night, eye glasses in reach, pain controlled) - Assess CVA risk, f/u lipid panel and A1c 
- Allow for permissive hypertension, but BPs <220/120 
- Neuro checks every 6hrs - Consult Neurology UTI in setting of BPH - Possibly cause of weakness and altered mental status. UA showed 4+ bacteria with >100 WBC and WBC casts, with moderate LE. 
- Rocephin 1g, q24 - F/u urine cx 
- Daily CBC Accelerated Hypertension with orthostatic hypotension - BP on admission 171/81. At this time 210/105. Lower BP was taken while pt was standing. 
- Continuing home medications of Labetalol.  
- Will continue to monitor at this time and readjust as BP's trend. - Metoprolol PRN (received 1 dose of 1.25mg on admission) tp keep BPs <220/120. H/o DVT - on Eliquis. - Continue home Eliquis. HFpEF - echo in 4/17 showed EF 55-60%, no regional wall motion abnormalities with moderately increased concentric hypertrophy. Pt does not appear volume overloaded clinically. Pro-BNP is elevated to 3407 (last on 4/17 was 2968). CXR was clear. Troponin and Pro-BNP likely elevated from stable HF as unable to be properly cleared by renals. - Trend trops - Takes lasix PRN, although prescribed daily At risk for TEVIN Stage 2 CKD; admission Cr 2.08 and GFR 37. Baseline 1.1-1.4 and 60, respectivley. Per RIFLE guideline pt at risk for TEVIN. KDIGO guidelines are difficult to apply as pt does not have a creatinine measured from 24-48 hrs prior to admission. Etiology may be secondary to poor oral intake although pt feels like his has been eating and drinking adequately. Specific gravity of urine with acceptable. Pt  
-Avoid nephrotoxic agents. -IVF: NS @ 100mL/hr 
-Strict I&O, monitor to keep urine output > 30 mL/hr 
- Daily BMP Elevated Troponin - Pt denies CP on exam. Trops have been elevated since 12/15, the last in 4/8/17 were 0.10.  
- Trend trops with repeat ECG 
  
Diabetes Mellitus T2: Last HgA1c on 12/16 was 6.7.  
- Diet controlled. - Insulin Sliding Scale high sensitivity with AC&HS glucose checks. - Hypoglycemia protocols ordered. - Repeat A1c 
- Diabetic diet Hyperlipidemia: Last lipid panel on 12/16 and values were , , .  
-Continue home simvastatin 40mg - F/u lipid panel Prolonged Qtc - Qtc of 495, Previous ECG on 4/7/17 showed qtc of 483. - Avoid Qtc prolonging agents - Tele Parkinson Disease - progression of diease may be contributing to change in mentation and ability to stand. 
- Continue Sinemet Depression - stable. - Continue home Sertaline FEN/GI - Diabetic diet with low sodium. NS at 100mL/hr. Activity - Up with assistance DVT prophylaxis - Eliquis GI prophylaxis -  Not indicated Disposition - TBD. Consult to PT/OT. CODE STATUS:  FULL Patient seen and discussed with Dr. Xenia Naranjo (PGY-2). Katlyn Barr MD 
Family Medicine Resident Hospital Problems Hospital Problems  Date Reviewed: 10/19/2017 Codes Class Noted POA  
 UTI (urinary tract infection) ICD-10-CM: N39.0 ICD-9-CM: 599.0  1/9/2018 Unknown Altered mental status ICD-10-CM: R41.82 
ICD-9-CM: 780.97  1/9/2018 Unknown Elevated troponin ICD-10-CM: R74.8 ICD-9-CM: 790.6  1/9/2018 Unknown Accelerated hypertension ICD-10-CM: I10 
ICD-9-CM: 401.0  12/18/2015 Unknown

## 2018-01-09 NOTE — PROGRESS NOTES
1/9/2018   CARE MANAGEMENT NOTE:  CM is following pt in the ER for initial discharge planning. EMR reviewed. CM met with unaccompanied pt who was his own historian for this needs assessment. Reportedly, pt resides with his dtr, Mariia Rojas and her two children in a [place] apt. PTA, pt was ambulatory with a cane, and is indepn with ADLs. He no longer drives. Pt uses [email or web address]. He does not have home healthcare currently. DME in the home includes a cane, and elevated toilet. PCP is Dr. Jean Marie Soria. CM notified Nurse Navigator, Trevin Dodson of pt's admission. Pt is admitted under OBS status. CM provided written and oral explanation of Medicare Outpt OBS and State OBS letters. Signed copies of letters will be scanned into pt's chart. Plan is for pt to return home with possible home health as needed. Await PT/OT mirela Quigley

## 2018-01-09 NOTE — PROGRESS NOTES
[address] [facility]  
Senior Resident Admission Note CC: Weakness, AMS 
 
HPI: 
Ashley Suh is a 80 y.o. male with history of TIA, DM type 2, HTN, postural Hypotension, Parkinsons, Chronic history of DVT  who presents to the ER complaining of weakness over the last day. Family concerned noting possible mild speech slurring and patient \"leaning to the left side\" on standing. Patient denies chest pain, nausea, vomiting, noticing changes to his mentation. Patient possibly eating less than normal.  In ER vitals notable for elevated BP to the 190's/100's and orthostatic hypotension to 90/60's. Labs remarkable for Creatinine 2.08 (BL 1.1-1.3), Troponin - 0.20, Pro BNP - 3407, and UA remarable for WBC > 100, Bacteria 4+, moderate leukocyte esterase. CXR unremarkable, CT head showing no acute process present, but   Patient is admitted for evaluation of AMS. Chart reviewed. Visit Vitals  BP 95/56 (BP 1 Location: Left arm, BP Patient Position: Standing) Comment (BP Patient Position): Paitent c/o lightheadedness and nausea when standing.  Pulse 81  Temp 98.7 °F (37.1 °C)  Resp 20  SpO2 100% Physical Examination:  
General appearance - alert, well appearing, and in no distress Chest - clear to auscultation, no wheezes, rales or rhonchi, symmetric air entry Heart - normal rate, regular rhythm, normal S1, S2, no murmurs, rubs, clicks or gallops, Abdomen - soft, nontender, nondistended, no masses or organomegaly Neurological - alert, oriented, normal speech, no focal findings Extremities - peripheral pulses normal, no pedal edema, no clubbing or cyanosis A/P:  
 
Altered Mental status: Likely Multifactorial in nature. Patient with multiple potential causes for 'altered mental status present' including postural hypotension, possible infection, progression of chronic disease.   Patient is also high risk for TIA/Stroke though and requires further evaluation. 
- Admit to telemetry 
- Neuro checks, fall precautions, ambulate with assistance - Permissive Hypertension for 24 hours - f/u TSH, lipid panel, Hemoglobin A1C 
- MRI brain, Duplex carotid dopplers - Neurology consulted UTI with history of BPH: Though patient denies symptoms, UA is significantly abnormal and patient with acute issues with 'weakness' and 'AMS'. - Ceftriaxone - Follow up urine culture - Follow up blood cultures Accelerated Hypertension with orthostatic hypotension: Significantly elevated BP up to 's and 's, but with standing/sitting up BP drop significantly. - Allowing for permissive hypertension 
- Fall precautions Parkinson Disease: Potential contributing to altered mentation, CT head showing progression of chronic disease - Continue Sinemet Elevated Troponin:  Likely elevated in the setting of CKD and potentially cardiac strain in setting of UTI and accelerated hypertension. Patient asymptomatic otherwise and EKG unchanged from previously - Trend Troponin Q6H 
- Consider Cardiology consult, seen by Dr. Milton Contreras. At risk for TEVIN in CKD: Based on [place] criteria patient at risk for acute kidney dysfunction in setting of CKD stage 2.   
- Gentle IV hydration - Monitor for signs of fluid overload Chronic diastolic Heart failure with preserved ejection fraction: Noting elevated pro-BNP, but no signs of fluid overload, and pro-BNP can remain elevated in the setting of kidney dysfunction. 
- Monitor for signs of fluid retention Patient seen, examined, and discussed with Dr. Ingris Graf (PGY-1). For the remaining assessment and plan of other medical problems please refer to Dr. Reid Parada H&P for more details. Pt discussed with on-call attending physician Prem Tafoya MD 
Family Medicine Resident

## 2018-01-09 NOTE — CONSULTS
Feliberto Jackson MD    [address] [facility] [place], [address] [facility]    Office [phone],[facility] [phone]  Pager [phone]    Date of  Admission: 1/8/2018 10:35 PM  PCP- Sherren Honour, MD    Cassandra Tran is a 80 y.o. male admitted for Elevated troponin  Accelerated hypertension  Altered mental status  UTI (urinary tract infection). Consult requested by Rere Marks MD    Assessment/Plan    Altered mental status/slurred speech  History of hypertension/heart failure with preserved ejection fraction (chronic diastolic heart failure)  Troponin elevation  History of orthostatic hypotension  History of DVT-on anticoagulation  CKD-stage II        Plan:  Troponin elevation-0.20, 0.18. Minimal/flat. Not ACS related  Neurology workup in process/carotid Dopplers  Echocardiogram  Check for orthostasis  Though proBNP is elevated-in the setting of CKD, patient does not appear to be hypervolemic clinically. Ischemia workup - can be pursued as outpatient given comorbidities. I appreciate the opportunity to be involved in [place] [address]. See below note for details. Please do not hesitate to contact us with questions or concerns. Feliberto Jackson MD    Cardiac Testing/ Procedures: A. Cardiac Cath/PCI:    B.ECHO/JESSICA:Echo 5/19/11 - mild-mod LVH, EF 50-55% (low normal).  Mod LAE, mild MR. Echo 12/19/15 - Mod LVH. LVEF 55 %. Grade 2 diastolic dysfunction. RV normal. Mod LAE. Mild MR. Echo 4/80/17-EF 55-60%, LVH,,grade 2 diastolic dysfunction, LAE      C. StressNuclear/Stress ECHO/Stress test:Lexiscan Cardiolite  5/19/11 - no inducible ischemia; LVEF 45%    D. Vascular:    E. EP:    F. Miscellaneous:    Care Plan discussed with: Nursing Staff    Subjective:  Patient is 80-year-old AAM with hx of HTN, TIA, DM, chronic diastolic heart failure, h/o DVT on Eliquis, Parkinson disease, and postural hypotension is admitted for slurred speech and altered MS.  patient unable to provide history. As per chart, no history of chest pain/dyspnea/swelling lower extremities. No history of recent syncope/falls. Patient's systolic blood pressure was in the 80s when checked by  family at home. On arrival in the ER systolic blood pressure is documented as 171. Troponin 0 0.20, 0.18. Troponins elevated on prior admissions 4/2017. Patient has history of CKD and his creatinine is elevated at 2.08. NT proBNP 3407. Patient sees Dr. Mark Kitchen outpatient    Past Medical History:   Diagnosis Date    Arthritis 5/5/2010    Diabetes mellitus type 2, [facility]     DVT (deep venous thrombosis) ([facility] [address]     right LE DVT (mostly distal and non-occlusive) discovered 6/15    HTN (hypertension) 5/5/2010    hypertensive heart disease (LVH)    Hypercholesteremia 5/5/2010     in 4/09    Orthostatic hypotension     Complaints of dizziness.  (lying) to 120 (standing) in 3/18/11.  Parkinson disease ([facility] [address]     diagnosed around middle of may 2015      History reviewed. No pertinent surgical history.   No Known Allergies  Family History   Problem Relation Age of Onset    Heart Disease Mother     Diabetes Mother     Cancer Mother     Heart Disease Father     Asthma Father       Social History   Substance Use Topics    Smoking status: Former Smoker     Packs/day: 0.30     Years: 5.00     Types: Cigarettes, Pipe    Smokeless tobacco: Former User     Types: Snuff    Alcohol use 0.0 oz/week     0 Standard drinks or equivalent per week      Comment: very rarely          Medications:    (Not in a hospital admission)  Current Facility-Administered Medications   Medication Dose Route Frequency    apixaban (ELIQUIS) tablet 2.5 mg  2.5 mg Oral BID    labetalol (NORMODYNE) tablet 300 mg  300 mg Oral BID    simvastatin (ZOCOR) tablet 40 mg  40 mg Oral QHS    sertraline (ZOLOFT) tablet 50 mg  50 mg Oral DAILY    sodium chloride (NS) flush 5-10 mL  5-10 mL IntraVENous Q8H    sodium chloride (NS) flush 5-10 mL  5-10 mL IntraVENous PRN    0.9% sodium chloride infusion  100 mL/hr IntraVENous CONTINUOUS    glucose chewable tablet 16 g  4 Tab Oral PRN    dextrose (D50W) injection syrg 12.5-25 g  12.5-25 g IntraVENous PRN    glucagon (GLUCAGEN) injection 1 mg  1 mg IntraMUSCular PRN    insulin lispro (HUMALOG) injection   SubCUTAneous AC&HS    cefTRIAXone (ROCEPHIN) 1 g in 0.9% sodium chloride (MBP/ADV) 50 mL  1 g IntraVENous Q24H    potassium chloride (KLOR-CON) packet 20 mEq  20 mEq Oral Q2H    carbidopa-levodopa (SINEMET)  mg per tablet 1 Tab  1 Tab Oral TID     Current Outpatient Prescriptions   Medication Sig    labetalol (NORMODYNE) 300 mg tablet TAKE ONE TABLET BY MOUTH TWICE DAILY    losartan (COZAAR) 50 mg tablet Take 1 Tab by mouth daily.  furosemide (LASIX) 40 mg tablet Take 1 Tab by mouth daily.  sertraline (ZOLOFT) 50 mg tablet TAKE ONE TABLET BY MOUTH ONCE DAILY FOR ANXIETY    simvastatin (ZOCOR) 40 mg tablet Take 1 Tab by mouth nightly.  apixaban (ELIQUIS) 2.5 mg tablet TAKE ONE TABLET BY MOUTH TWICE DAILY    carbidopa-levodopa (SINEMET)  mg per tablet TAKE ONE TABLET BY MOUTH THREE TIMES DAILY    therapeutic multivitamin (THERAGRAN) tablet Take 1 Tab by mouth daily. Review of Systems:  (bold if positive, if negative)    Limited - as in HPI        Physical Exam:  Visit Vitals    /83    Pulse 83    Temp 98.3 °F (36.8 °C)    Resp 13    Ht 5' 10\" (1.778 m)    Wt 170 lb (77.1 kg)    SpO2 100%    BMI 24.39 kg/m2         Telemetry:     Gen: Well-developed, well-nourished, in no acute distress, elderly , confused  HEENT:  hearing intact to voice, moist mucous membranes  Neck: Supple,No JVD,  Resp: No accessory muscle use, Clear breath sounds, No rales  Card: Regular Rate,Rythm,Normal S1, S2, 2/6 sys murmur+, No rubs or gallop. No thrills.    Abd:  Soft, non-tender, non-distended, normoactive bowel sounds are present,   MSK: No cyanosis   Skin: No rashes  Neuro:  moving all four extremities,  Psych: Confused  LE: No edema  Vascular:Radial Pulses 2+ and symmetric        EKG: Sinus rhythm, LVH with ST-T changes, prolonged QTC-483 ms      Cxray:    LABS:        Lab Results   Component Value Date/Time    WBC 5.0 01/09/2018 04:35 AM    HGB 11.2 01/09/2018 04:35 AM    HCT 34.1 01/09/2018 04:35 AM    PLATELET 943 50/44/7268 04:35 AM    MCV 90.9 01/09/2018 04:35 AM     Lab Results   Component Value Date/Time    Sodium 149 01/09/2018 04:35 AM    Potassium 3.4 01/09/2018 04:35 AM    Chloride 109 01/09/2018 04:35 AM    CO2 28 01/09/2018 04:35 AM    Anion gap 12 01/09/2018 04:35 AM    Glucose 89 01/09/2018 04:35 AM    BUN 28 01/09/2018 04:35 AM    Creatinine 1.61 01/09/2018 04:35 AM    BUN/Creatinine ratio 17 01/09/2018 04:35 AM    GFR est AA 50 01/09/2018 04:35 AM    GFR est non-AA 41 01/09/2018 04:35 AM    Calcium 7.6 01/09/2018 04:35 AM     Lab Results   Component Value Date/Time     12/18/2015 07:55 PM    CK-MB Index 1.0 12/18/2015 07:55 PM    Troponin-I, Qt. 0.18 01/09/2018 04:35 AM     Lab Results   Component Value Date/Time    aPTT 27.5 04/26/2009 07:55 AM     Lab Results   Component Value Date/Time    INR 1.1 01/09/2018 04:47 AM    INR 1.0 08/22/2009 01:45 PM    INR 1.0 04/26/2009 07:55 AM    Prothrombin time 10.8 01/09/2018 04:47 AM    Prothrombin time 10.0 08/22/2009 01:45 PM    Prothrombin time 10.2 04/26/2009 07:55 AM     No results found for: BNP, BNPP, XBNPT      Bulmaro Martin MD

## 2018-01-09 NOTE — ED NOTES
Bedside and Verbal shift change report given to april (oncoming nurse) by Ewa Mckenna (offgoing nurse). Report included the following information SBAR, ED Summary, Intake/Output, MAR and Recent Results.

## 2018-01-09 NOTE — PROGRESS NOTES
[address] [facility] PROGRAM 
PROGRESS NOTE  
 
1/10/2018 PCP: Gisselle Barrios MD  
 
Assessment/Plan:  
Elio Gonzalez is a 80 y.o. male with PMH signficiant for DVT, who is admitted for AMS found to have UTI. 
 
24 Hour Events: BP elevated to 180s/110s given IV Hydralazine 20mg x1 Encephalopathy in pt with h/o TIA - Much Improved. CT Head neg for acute process but signficant for microvascular infarcts of undetermined age. UA significant for WBCs, moderate LE, 4+ bacteria. TSH 3.15. Orthostatics in ED positive for inadequate response of BP to positional changes. 
- Neuro consulted, canceled MRI/MRA - Echo EF 50% with mildly increased wall thickness - Carotid dopplers <50% ICA stenosis BL  
- Fall precautions, Ambulate with assistance 
- PT/OT recommend HH vs SNF 
  
Complicated UTI with hx of BPH - UA with moderate LE, >100 WBC, 4+ bacteria. UCx not collected in ED> 
- IV CTX 1g daily (day 2) 
  Accelerated Hypertension with orthostatic hypotension - BPs overnight 180s/110s. Given Hydralazine 20mg x1.  
- Labetalol 300mg BID 
  
H/o DVT - On Eliquis 2.5mg BID. 
- Eliquis 6.0HR BID 
  
Systolic Heart Failure with reduced EF - Echo EF 50% with mildly increased wall thickness. POA CXR celar. ProBNP elevated 3407 (last admission 2968), may be elevated at [facility]. 
- Cards consulted - increased dose of Labetalol and discontinued Losartan 
  
At risk for TEVIN Stage 2 CKD - POA GFR 37 (BL 60), Cr 2.08 (BL 1.1-1.4). Today Cr back to BL 1.18. 
- DC IVF and restart Lasix 20mg daily 
- Daily BMP 
  
Elevated Troponin - 0.2, 0.18 and 0.26 respectively. - Cards consulted, increased Labetalol, discontinued Losartan 
   
Diabetes Mellitus T2 -  A1c 6.4 
- Diet controlled, hypoglycemia protocols ordered 
- SSI high sensitivity with ACHS BG 
- Diabetic diet 
  
HLD - , TG 51, , HDL 86 
- Simvastatin 40mg daily 
  
Prolonged Qtc - Qtc of 495, Previous ECG on 4/7/17 showed qtc of 483.  
- Avoid Qtc prolonging agents - Cardiac monitoring 
  
Parkinson Disease -Progression of disease may be contributing to instability.  
- Continue Sinemet 25-250mg tablet TID 
  
Depression - stable. - Zoloft 50mg qhs 
 
Electrolyte Abnormalities - Hypomagnesemia - Mg 1.8, IV Mag 2g x1 
  
FEN/GI - Diabetic diet with low sodium. NS at 100mL/hr. Activity - Up with assistance DVT prophylaxis - Eliquis GI prophylaxis -  Not indicated Disposition - PT/OT recommend HH vs SNF 
CODE STATUS:  FULL 
  
I appreciate the opportunity to participate in the care of this patient, Verner Corona, DO Family Medicine Resident 
  
Pt will be discussed with Dr. Willian Vazquez MD ([address] [facility] attending physician) Subjective: Pt was seen and examined at bedside. Patient states he is doing well and denies pain. States he thinks he is doing better today. Acute Events Overnight: Elevated BP given Hydralazine 20 x1 Diet: Tolerating diet Activity Level: Having difficulty getting up per RN 
 
Objective:  
Physical examination Visit Vitals  /84 (BP 1 Location: Right arm, BP Patient Position: At rest)  Pulse 76  Temp 98.6 °F (37 °C)  Resp 18  Ht 5' 10\" (1.778 m)  Wt 163 lb 12.8 oz (74.3 kg)  SpO2 99%  BMI 23.5 kg/m2 Temp (24hrs), Av.5 °F (36.9 °C), Min:98 °F (36.7 °C), Max:98.8 °F (37.1 °C) O2 Device: Room air Intake/Output Summary (Last 24 hours) at 01/10/18 1722 Last data filed at 01/10/18 1440 Gross per 24 hour Intake          2696.67 ml Output              600 ml Net          2096.67 ml Last shift: 
  01/10 0701 - 01/10 1900 In: -  
Out: 200 [Urine:200] Last 3 shifts: 
  1901 - 01/10 07 In: 2696.7 [I.V.:2696.7] Out: 600 [Urine:600] General:   Alert, thin elderly man, cooperative, no acute distress Lungs:   Clear to auscultation bilaterally Heart:   R Carotid bruit +, RRR, S1 and S2 present, systolic murmur, no rubs/ gallops Abdomen:    Soft, non-tender, bowel sounds present No masses or organomegaly Extremities:  Minimal pitting edema in feet or DVT signs Pulses:  Symmetric all extremities Skin:  Warm and dry No rashes or lesions Neurologic:  Oriented to person and place but not time No focal deficits Data Review:  
Reviewed Hg 11, Cr 1.18, K 4.3 Telemetry NSR Recent Labs  
   01/10/18 
 0114  01/09/18 
 0435  01/08/18 
 2254 WBC  4.6  5.0  4.4 HGB  11.0*  11.2*  12.9 HCT  33.2*  34.1*  40.2 PLT  163  171  176 Recent Labs  
   01/10/18 
 0114  01/09/18 
 1012  01/09/18 
 0447  01/09/18 
 0435  01/08/18 
 2254 NA  140   --    --   149*  143  
K  4.3   --    --   3.4*  4.1 CL  105   --    --   109*  102 CO2  28   --    --   28  30 GLU  104*   --    --   89  54*  
BUN  28*   --    --   28*  32* CREA  1.18   --    --   1.61*  2.08* CA  8.8   --    --   7.6*  9.5 MG  1.8  1.8   --    --    --   
PHOS  2.8  3.0   --    --    --   
ALB   --    --    --    --   4.0 TBILI   --    --    --    --   0.5 SGOT   --    --    --    --   25 ALT   --    --    --    --   10* INR   --    --   1.1   --    -- Medications reviewed Current Facility-Administered Medications Medication Dose Route Frequency  potassium, sodium phosphates (NEUTRA-PHOS) packet 1 Packet  1 Packet Oral QID  furosemide (LASIX) tablet 20 mg  20 mg Oral DAILY  apixaban (ELIQUIS) tablet 2.5 mg  2.5 mg Oral BID  labetalol (NORMODYNE) tablet 300 mg  300 mg Oral BID  simvastatin (ZOCOR) tablet 40 mg  40 mg Oral QHS  sertraline (ZOLOFT) tablet 50 mg  50 mg Oral DAILY  sodium chloride (NS) flush 5-10 mL  5-10 mL IntraVENous Q8H  
 sodium chloride (NS) flush 5-10 mL  5-10 mL IntraVENous PRN  
 glucose chewable tablet 16 g  4 Tab Oral PRN  
 dextrose (D50W) injection syrg 12.5-25 g  12.5-25 g IntraVENous PRN  
 glucagon (GLUCAGEN) injection 1 mg  1 mg IntraMUSCular PRN  
 insulin lispro (HUMALOG) injection   SubCUTAneous AC&HS  cefTRIAXone (ROCEPHIN) 1 g in 0.9% sodium chloride (MBP/ADV) 50 mL  1 g IntraVENous Q24H  carbidopa-levodopa (SINEMET)  mg per tablet 1 Tab  1 Tab Oral TID Signed: 
 Patric Hughes DO Resident, Family Medicine, PGY1 Attending note: Attending note to follow. ..

## 2018-01-09 NOTE — PROGRESS NOTES
physical Therapy EVALUATION Patient: Christina Elizabeth (12 y.o. male) Date: 1/9/2018 Primary Diagnosis: Elevated troponin Accelerated hypertension Altered mental status UTI (urinary tract infection) Precautions:   Fall ASSESSMENT : 
Based on the objective data described below, the patient presents with decreased cognition, balance and activity tolerance following admission for AMS and UTI. Patient found to be orthostatic in ER. Patient today seen in the ER, he is requiring increased assistance for rolling and transfers, unable to ambulate due to not being on a portable IV pole. He was incontient of urine with fowl smell, linens changed. Patient with no family present, but states he uses a RW prior to admission and lives with his daughter. He does report a fall history. Patient has significant co-morbidities impacting her care including: Parkinson's , HTN, and DVT's. Patient would benefit from home health vs SNF at discharge depending on his progress with upright mobility. . 
 
Patient will benefit from skilled intervention to address the above impairments. Patients rehabilitation potential is considered to be Fair Factors which may influence rehabilitation potential include:  
[]         None noted 
[]         Mental ability/status [x]         Medical condition 
[]         Home/family situation and support systems 
[]         Safety awareness 
[]         Pain tolerance/management 
[]         Other: PLAN : 
Recommendations and Planned Interventions: 
[x]           Bed Mobility Training             [x]    Neuromuscular Re-Education 
[x]           Transfer Training                   []    Orthotic/Prosthetic Training 
[x]           Gait Training                         []    Modalities [x]           Therapeutic Exercises           []    Edema Management/Control 
[x]           Therapeutic Activities            [x]    Patient and Family Training/Education 
[]           Other (comment): 
 Frequency/Duration: Patient will be followed by physical therapy  5 times a week to address goals. Discharge Recommendations: [address] Further Equipment Recommendations for Discharge: TBD SUBJECTIVE:  
Patient stated Linette Jaramillo think she just left.  referring to his daughter OBJECTIVE DATA SUMMARY:  
HISTORY:   
Past Medical History:  
Diagnosis Date  Arthritis 5/5/2010  Diabetes mellitus type 2, diet-controlled ([facility] [address]  DVT (deep venous thrombosis) ([facility] [address] right LE DVT (mostly distal and non-occlusive) discovered 6/15  
 HTN (hypertension) 5/5/2010  
 hypertensive heart disease (LVH)  Hypercholesteremia 5/5/2010  in 4/09  Orthostatic hypotension Complaints of dizziness.  (lying) to 120 (standing) in 3/18/11.  Parkinson disease ([facility] [address] diagnosed around middle of may 2015 History reviewed. No pertinent surgical history. Prior Level of Function/Home Situation: see above Personal factors and/or comorbidities impacting plan of care:  
 
Home Situation Home Environment: Private residence # Steps to Enter: 2 Rails to Enter: Yes Hand Rails : Right One/Two Story Residence: One story Living Alone: No 
Support Systems: Family member(s) (daughter works) Patient Expects to be Discharged to[de-identified] Private residence Current DME Used/Available at Home: Walker, rolling EXAMINATION/PRESENTATION/DECISION MAKING:  
Critical Behavior: 
Neurologic State: Alert Orientation Level: Disoriented to time, Oriented to place, Oriented to person, Oriented to situation Hearing: Auditory Auditory Impairment: Hard of hearing, bilateral 
Skin:  All exposed intact Edema: none noted Range Of Motion: 
AROM: Within functional limits PROM: Within functional limits Strength:   
Strength: Generally decreased, functional 
  
  
  
  
  
  
Tone & Sensation:  
Tone: Normal 
  
  
  
  
Sensation: Intact Coordination: Coordination: Grossly decreased, non-functional 
Vision:  
  
Functional Mobility: 
Bed Mobility: 
Rolling: Moderate assistance Supine to Sit: Maximum assistance;Assist x1;Additional time Sit to Supine: Maximum assistance;Assist x1;Additional time Transfers: 
  
  
     
  
     
  
  
Balance:  
Sitting: Impaired Sitting - Static: Poor (constant support) (posterior lean) Ambulation/Gait Training: 
  
  
  
  
Gait Description (WDL): Exceptions to [facility] Stairs: Therapeutic Exercises:  
 
 
Functional Measure: 
Barthel Index: 
 
Bathin Bladder: 0 Bowels: 5 Groomin Dressin Feedin Mobility: 0 Stairs: 0 Toilet Use: 0 Transfer (Bed to Chair and Back): 5 Total: 20 Barthel and G-code impairment scale: 
Percentage of impairment CH 
0% CI 
1-19% CJ 
20-39% CK 
40-59% CL 
60-79% CM 
80-99% CN 
100% Barthel Score 0-100 100 99-80 79-60 59-40 20-39 1-19 
 0 Barthel Score 0-20 20 17-19 13-16 9-12 5-8 1-4 0 The Barthel ADL Index: Guidelines 1. The index should be used as a record of what a patient does, not as a record of what a patient could do. 2. The main aim is to establish degree of independence from any help, physical or verbal, however minor and for whatever reason. 3. The need for supervision renders the patient not independent. 4. A patient's performance should be established using the best available evidence. Asking the patient, friends/relatives and nurses are the usual sources, but direct observation and common sense are also important. However direct testing is not needed. 5. Usually the patient's performance over the preceding 24-48 hours is important, but occasionally longer periods will be relevant. 6. Middle categories imply that the patient supplies over 50 per cent of the effort. 7. Use of aids to be independent is allowed. Evangelina Aguilera, Barthel, D.W. (4321). Functional evaluation: the Barthel Index. [address] [facility] (14)2. AWAIS Russell, Jana Goodpasture.George., [place], [address] (1999). Measuring the change indisability after inpatient rehabilitation; comparison of the responsiveness of the Barthel Index and Functional [place] Measure. Journal of Neurology, Neurosurgery, and Psychiatry, 66(4), 297-190. LIZETH Kaminski, STEPH Acevedo, & Darryl Borden M.A. (2004.) Assessment of post-stroke quality of life in cost-effectiveness studies: The usefulness of the Barthel Index and the EuroQoL-5D. [facility], 13, 084-88 G codes: In compliance with CMSs Claims Based Outcome Reporting, the following G-code set was chosen for this patient based on their primary functional limitation being treated: The outcome measure chosen to determine the severity of the functional limitation was the Barthel Index with a score of 20/100 which was correlated with the impairment scale. ? Mobility - Walking and Moving Around:  
  - CURRENT STATUS: CM - 80%-99% impaired, limited or restricted  - GOAL STATUS: CL - 60%-79% impaired, limited or restricted  - D/C STATUS:  ---------------To be determined--------------- Physical Therapy Evaluation Charge Determination History Examination Presentation Decision-Making HIGH Complexity :3+ comorbidities / personal factors will impact the outcome/ POC  HIGH Complexity : 4+ Standardized tests and measures addressing body structure, function, activity limitation and / or participation in recreation  MEDIUM Complexity : Evolving with changing characteristics  Other outcome measures Barthel Index  HIGH Based on the above components, the patient evaluation is determined to be of the following complexity level: MEDIUM Pain: 
Pain Scale 1: Numeric (0 - 10) Pain Intensity 1: 0 Activity Tolerance:  
Fair- no complicaitons Please refer to the flowsheet for vital signs taken during this treatment.  
After treatment:  
[x]         Patient left in no apparent distress sitting up in chair 
[]         Patient left in no apparent distress in bed 
[x]         Call bell left within reach [x]         Nursing notified 
[]         Caregiver present 
[]         Bed alarm activated- no able due to stretcher chair COMMUNICATION/EDUCATION:  
The patients plan of care was discussed with: Registered Nurse. [x]         Fall prevention education was provided and the patient/caregiver indicated understanding. [x]         Patient/family have participated as able in goal setting and plan of care. [x]         Patient/family agree to work toward stated goals and plan of care. []         Patient understands intent and goals of therapy, but is neutral about his/her participation. []         Patient is unable to participate in goal setting and plan of care. Thank you for this referral. 
Trinidad Mackay, PT, DPT Time Calculation: 20 mins

## 2018-01-09 NOTE — PROGRESS NOTES
Occupational Therapy Note: 
Orders acknowledged and chart reviewed. Attempted to see patient this afternoon however he was not agreeable/declined despite max encouragement. Will follow-up next tx day.    
Veena Puente OTR/L

## 2018-01-09 NOTE — CONSULTS
NAME:  Spenser Potts   :  1935  MRN:  [id number]  Date:  2018   PCP:    Denisse Lr MD      Neurology:    · Consult received, chart reviewed. Patient to be seen this AM.    · Note MRI brain ordered, will wait on this till patient is evaluated. With orthostatic hypotension, PD hx and UTI, there is sufficient rationale (per chart review) for patient's symptoms to occur but will discuss with pt and family and determine if MRI is necessary.     _______________  [facility]

## 2018-01-10 PROBLEM — G93.40 ENCEPHALOPATHY: Status: ACTIVE | Noted: 2018-01-10

## 2018-01-10 LAB
ANION GAP SERPL CALC-SCNC: 7 MMOL/L (ref 5–15)
BUN SERPL-MCNC: 28 MG/DL (ref 6–20)
BUN/CREAT SERPL: 24 (ref 12–20)
CALCIUM SERPL-MCNC: 8.8 MG/DL (ref 8.5–10.1)
CHLORIDE SERPL-SCNC: 105 MMOL/L (ref 97–108)
CO2 SERPL-SCNC: 28 MMOL/L (ref 21–32)
CREAT SERPL-MCNC: 1.18 MG/DL (ref 0.7–1.3)
ERYTHROCYTE [DISTWIDTH] IN BLOOD BY AUTOMATED COUNT: 14.4 % (ref 11.5–14.5)
GLUCOSE BLD STRIP.AUTO-MCNC: 50 MG/DL (ref 65–100)
GLUCOSE BLD STRIP.AUTO-MCNC: 86 MG/DL (ref 65–100)
GLUCOSE BLD STRIP.AUTO-MCNC: 91 MG/DL (ref 65–100)
GLUCOSE BLD STRIP.AUTO-MCNC: 95 MG/DL (ref 65–100)
GLUCOSE BLD STRIP.AUTO-MCNC: 98 MG/DL (ref 65–100)
GLUCOSE BLD STRIP.AUTO-MCNC: 98 MG/DL (ref 65–100)
GLUCOSE SERPL-MCNC: 104 MG/DL (ref 65–100)
HCT VFR BLD AUTO: 33.2 % (ref 36.6–50.3)
HGB BLD-MCNC: 11 G/DL (ref 12.1–17)
MAGNESIUM SERPL-MCNC: 1.8 MG/DL (ref 1.6–2.4)
MCH RBC QN AUTO: 29.9 PG (ref 26–34)
MCHC RBC AUTO-ENTMCNC: 33.1 G/DL (ref 30–36.5)
MCV RBC AUTO: 90.2 FL (ref 80–99)
PHOSPHATE SERPL-MCNC: 2.8 MG/DL (ref 2.6–4.7)
PLATELET # BLD AUTO: 163 K/UL (ref 150–400)
POTASSIUM SERPL-SCNC: 4.3 MMOL/L (ref 3.5–5.1)
RBC # BLD AUTO: 3.68 M/UL (ref 4.1–5.7)
SERVICE CMNT-IMP: ABNORMAL
SERVICE CMNT-IMP: NORMAL
SODIUM SERPL-SCNC: 140 MMOL/L (ref 136–145)
WBC # BLD AUTO: 4.6 K/UL (ref 4.1–11.1)

## 2018-01-10 PROCEDURE — 96361 HYDRATE IV INFUSION ADD-ON: CPT

## 2018-01-10 PROCEDURE — 80048 BASIC METABOLIC PNL TOTAL CA: CPT | Performed by: FAMILY MEDICINE

## 2018-01-10 PROCEDURE — 84100 ASSAY OF PHOSPHORUS: CPT | Performed by: FAMILY MEDICINE

## 2018-01-10 PROCEDURE — 97530 THERAPEUTIC ACTIVITIES: CPT

## 2018-01-10 PROCEDURE — 74011250636 HC RX REV CODE- 250/636: Performed by: STUDENT IN AN ORGANIZED HEALTH CARE EDUCATION/TRAINING PROGRAM

## 2018-01-10 PROCEDURE — 83735 ASSAY OF MAGNESIUM: CPT | Performed by: FAMILY MEDICINE

## 2018-01-10 PROCEDURE — 97116 GAIT TRAINING THERAPY: CPT

## 2018-01-10 PROCEDURE — 36415 COLL VENOUS BLD VENIPUNCTURE: CPT | Performed by: FAMILY MEDICINE

## 2018-01-10 PROCEDURE — 74011250637 HC RX REV CODE- 250/637: Performed by: FAMILY MEDICINE

## 2018-01-10 PROCEDURE — 74011000258 HC RX REV CODE- 258: Performed by: STUDENT IN AN ORGANIZED HEALTH CARE EDUCATION/TRAINING PROGRAM

## 2018-01-10 PROCEDURE — G8987 SELF CARE CURRENT STATUS: HCPCS

## 2018-01-10 PROCEDURE — 85027 COMPLETE CBC AUTOMATED: CPT | Performed by: FAMILY MEDICINE

## 2018-01-10 PROCEDURE — 74011250637 HC RX REV CODE- 250/637: Performed by: NURSE PRACTITIONER

## 2018-01-10 PROCEDURE — 82962 GLUCOSE BLOOD TEST: CPT

## 2018-01-10 PROCEDURE — 74011250636 HC RX REV CODE- 250/636: Performed by: FAMILY MEDICINE

## 2018-01-10 PROCEDURE — 93880 EXTRACRANIAL BILAT STUDY: CPT

## 2018-01-10 PROCEDURE — G8988 SELF CARE GOAL STATUS: HCPCS

## 2018-01-10 PROCEDURE — 97535 SELF CARE MNGMENT TRAINING: CPT

## 2018-01-10 PROCEDURE — 65660000000 HC RM CCU STEPDOWN

## 2018-01-10 PROCEDURE — 99218 HC RM OBSERVATION: CPT

## 2018-01-10 PROCEDURE — 97165 OT EVAL LOW COMPLEX 30 MIN: CPT

## 2018-01-10 RX ORDER — MAGNESIUM SULFATE HEPTAHYDRATE 40 MG/ML
2 INJECTION, SOLUTION INTRAVENOUS ONCE
Status: DISCONTINUED | OUTPATIENT
Start: 2018-01-10 | End: 2018-01-10

## 2018-01-10 RX ORDER — SODIUM,POTASSIUM PHOSPHATES 280-250MG
1 POWDER IN PACKET (EA) ORAL ONCE
Status: DISCONTINUED | OUTPATIENT
Start: 2018-01-10 | End: 2018-01-10

## 2018-01-10 RX ORDER — HYDRALAZINE HYDROCHLORIDE 20 MG/ML
20 INJECTION INTRAMUSCULAR; INTRAVENOUS ONCE
Status: COMPLETED | OUTPATIENT
Start: 2018-01-10 | End: 2018-01-10

## 2018-01-10 RX ORDER — FUROSEMIDE 20 MG/1
20 TABLET ORAL DAILY
Status: DISCONTINUED | OUTPATIENT
Start: 2018-01-10 | End: 2018-01-13 | Stop reason: HOSPADM

## 2018-01-10 RX ORDER — FUROSEMIDE 40 MG/1
40 TABLET ORAL DAILY
Status: DISCONTINUED | OUTPATIENT
Start: 2018-01-10 | End: 2018-01-10

## 2018-01-10 RX ORDER — SODIUM,POTASSIUM PHOSPHATES 280-250MG
1 POWDER IN PACKET (EA) ORAL 4 TIMES DAILY
Status: DISCONTINUED | OUTPATIENT
Start: 2018-01-10 | End: 2018-01-13 | Stop reason: HOSPADM

## 2018-01-10 RX ORDER — MAGNESIUM SULFATE HEPTAHYDRATE 40 MG/ML
2 INJECTION, SOLUTION INTRAVENOUS ONCE
Status: COMPLETED | OUTPATIENT
Start: 2018-01-10 | End: 2018-01-10

## 2018-01-10 RX ADMIN — Medication 10 ML: at 21:41

## 2018-01-10 RX ADMIN — APIXABAN 2.5 MG: 2.5 TABLET, FILM COATED ORAL at 18:32

## 2018-01-10 RX ADMIN — CARBIDOPA AND LEVODOPA 1 TABLET: 25; 250 TABLET ORAL at 18:31

## 2018-01-10 RX ADMIN — Medication 10 ML: at 03:50

## 2018-01-10 RX ADMIN — Medication 10 ML: at 14:00

## 2018-01-10 RX ADMIN — POTASSIUM & SODIUM PHOSPHATES POWDER PACK 280-160-250 MG 1 PACKET: 280-160-250 PACK at 09:27

## 2018-01-10 RX ADMIN — POTASSIUM & SODIUM PHOSPHATES POWDER PACK 280-160-250 MG 1 PACKET: 280-160-250 PACK at 18:31

## 2018-01-10 RX ADMIN — SIMVASTATIN 40 MG: 20 TABLET, FILM COATED ORAL at 21:40

## 2018-01-10 RX ADMIN — CEFTRIAXONE SODIUM 1 G: 1 INJECTION, POWDER, FOR SOLUTION INTRAMUSCULAR; INTRAVENOUS at 21:40

## 2018-01-10 RX ADMIN — FUROSEMIDE 20 MG: 20 TABLET ORAL at 09:30

## 2018-01-10 RX ADMIN — SERTRALINE HYDROCHLORIDE 50 MG: 50 TABLET ORAL at 09:30

## 2018-01-10 RX ADMIN — HYDRALAZINE HYDROCHLORIDE 20 MG: 20 INJECTION INTRAMUSCULAR; INTRAVENOUS at 02:11

## 2018-01-10 RX ADMIN — APIXABAN 2.5 MG: 2.5 TABLET, FILM COATED ORAL at 09:30

## 2018-01-10 RX ADMIN — POTASSIUM & SODIUM PHOSPHATES POWDER PACK 280-160-250 MG 1 PACKET: 280-160-250 PACK at 21:40

## 2018-01-10 RX ADMIN — LABETALOL HCL 300 MG: 200 TABLET, FILM COATED ORAL at 18:31

## 2018-01-10 RX ADMIN — CARBIDOPA AND LEVODOPA 1 TABLET: 25; 250 TABLET ORAL at 21:40

## 2018-01-10 RX ADMIN — CARBIDOPA AND LEVODOPA 1 TABLET: 25; 250 TABLET ORAL at 09:30

## 2018-01-10 RX ADMIN — POTASSIUM & SODIUM PHOSPHATES POWDER PACK 280-160-250 MG 1 PACKET: 280-160-250 PACK at 13:00

## 2018-01-10 RX ADMIN — LABETALOL HCL 300 MG: 200 TABLET, FILM COATED ORAL at 09:30

## 2018-01-10 RX ADMIN — MAGNESIUM SULFATE HEPTAHYDRATE 2 G: 40 INJECTION, SOLUTION INTRAVENOUS at 05:54

## 2018-01-10 NOTE — PROGRESS NOTES
Problem: Falls - Risk of 
Goal: *Absence of Falls Document Emilyrui Reynolds Fall Risk and appropriate interventions in the flowsheet. Outcome: Progressing Towards Goal 
Fall Risk Interventions: 
Mobility Interventions: Bed/chair exit alarm, Communicate number of staff needed for ambulation/transfer Mentation Interventions: Bed/chair exit alarm, Door open when patient unattended, Update white board, Room close to nurse's station, More frequent rounding, Reorient patient Medication Interventions: Bed/chair exit alarm, Teach patient to arise slowly, Patient to call before getting OOB Elimination Interventions: Bed/chair exit alarm, Call light in reach, Patient to call for help with toileting needs, Urinal in reach

## 2018-01-10 NOTE — PROGRESS NOTES
Bedside and Verbal shift change report given to [facility] (oncoming nurse) by Jessica Vivas (offgoing nurse). Report included the following information SBAR, Kardex, ED Summary, Procedure Summary, Intake/Output, MAR, Recent Results and Cardiac Rhythm nsr. 
  
9260 Corina from Cardiology at bedside. Order to dc IVF, give lasix po 20 mg 
 
0932 pt off floor to doppler, tele notified 0935 cancelled 1030 pt off floor to doppler, tele notified 1120 pt back on floor, tele notified 1339 order for consult to turn team, nutrition services, skin checks [address] Marie given phone update, will come in tonight around 2300. Bedside and Verbal shift change report given to Shital (oncoming nurse) by [facility] (offgoing nurse). Report included the following information SBAR, Kardex, Intake/Output, MAR and Cardiac Rhythm nsr.

## 2018-01-10 NOTE — PROGRESS NOTES
Occupational Therapy Chart reviewed, patient off the floor for testing will follow back up for OT evaluation as able. Juarez Duke.  MS Juan OTR/L

## 2018-01-10 NOTE — ED NOTES
TRANSFER - OUT REPORT: 
 
Verbal report given to RAMANDEEP Isaacs(name) on Mera Round  being transferred to 319(unit) for routine progression of care Report consisted of patients Situation, Background, Assessment and  
Recommendations(SBAR). Information from the following report(s) SBAR, Kardex, ED Summary, [facility] and Recent Results was reviewed with the receiving nurse. Lines:  
Peripheral IV 01/08/18 Left Antecubital (Active) Site Assessment Clean, dry, & intact 1/9/2018  8:00 AM  
Phlebitis Assessment 0 1/9/2018  8:00 AM  
Infiltration Assessment 0 1/9/2018  8:00 AM  
Dressing Status Clean, dry, & intact 1/9/2018  8:00 AM  
Dressing Type Transparent 1/9/2018  8:00 AM  
Hub Color/Line Status Green 1/9/2018  8:00 AM  
Action Taken Blood drawn 1/8/2018 11:10 PM  
Alcohol Cap Used Yes 1/9/2018  8:00 AM  
  
 
Opportunity for questions and clarification was provided. Patient transported with: 
 Monitor

## 2018-01-10 NOTE — PROGRESS NOTES
Problem: Self Care Deficits Care Plan (Adult) Goal: *Acute Goals and Plan of Care (Insert Text) Occupational Therapy Goals Initiated 1/10/2018 1. Patient will perform self-feeding with modified independence within 7 day(s). 2.  Patient will perform upper body dressing with modified independence within 7 day(s). 3.  Patient will perform lower body dressing with minimal assistance/contact guard assist within 7 day(s). 4.  Patient will perform toilet transfers with minimal assistance/contact guard assist within 7 day(s). 5.  Patient will perform all aspects of toileting with minimal assistance/contact guard assist within 7 day(s). 6.  Patient will participate in upper extremity therapeutic exercise/activities with modified independence for 5 minutes within 7 day(s). 7.  Patient will utilize energy conservation techniques during functional activities with verbal cues within 7 day(s). Occupational Therapy EVALUATION Patient: Didi Garcia (89 y.o. male) Date: 1/10/2018 Primary Diagnosis: Elevated troponin Accelerated hypertension Altered mental status UTI (urinary tract infection) Encephalopathy UTI (urinary tract infection) Precautions:   Fall ASSESSMENT : 
Based on the objective data described below, the patient presents with decreased strength, balance and activity tolerance s/p admission with UTI, AMS, agreeable to sitting EOB then requesting bathroom, mod A x2 for sit to stand and ambulation to restroom with RW, with verbal and physical assist needed for RW management, max A for toilet transfer and toileting, fair balance with no LOB during toileting needs, required significant increased time for sequencing and completing each step due slow initiation and freezing during task due to Parkinson's disease symptoms. Patient agreeable to all activity but poor insight into need for assist and deficits, unsure of how much family assists with ADLs, per srinivasan has RW, cane and BSC.  Will need rehab at discharge to maximize independence prior to return home with family. Patient will benefit from skilled intervention to address the above impairments. Patients rehabilitation potential is considered to be Fair Factors which may influence rehabilitation potential include:  
[]             None noted []             Mental ability/status [x]             Medical condition []             Home/family situation and support systems []             Safety awareness []             Pain tolerance/management 
[]             Other: PLAN : 
Recommendations and Planned Interventions: 
[x]               Self Care Training                  [x]        Therapeutic Activities [x]               Functional Mobility Training    []        Cognitive Retraining 
[x]               Therapeutic Exercises           [x]        Endurance Activities [x]               Balance Training                   [x]        Neuromuscular Re-Education []               Visual/Perceptual Training     []   Home Safety Training 
[x]               Patient Education                 []        Family Training/Education []               Other (comment): Frequency/Duration: Patient will be followed by occupational therapy 5 times a week to address goals. Discharge Recommendations: Rehab Further Equipment Recommendations for Discharge: TBD SUBJECTIVE:  
Patient stated I can bring both my feet up I'll fall.  Re: poor insight into need for assist with ADLs/LB dressing OBJECTIVE DATA SUMMARY:  
HISTORY:  
Past Medical History:  
Diagnosis Date  Arthritis 5/5/2010  Diabetes mellitus type 2, diet-controlled ([facility] [address]  DVT (deep venous thrombosis) ([facility] [address] right LE DVT (mostly distal and non-occlusive) discovered 6/15  
 HTN (hypertension) 5/5/2010  
 hypertensive heart disease (LVH)  Hypercholesteremia 5/5/2010  in 4/09  Orthostatic hypotension Complaints of dizziness.    (lying) to 120 (standing) in 3/18/11.  Parkinson disease ([facility] [address] diagnosed around middle of may 2015 History reviewed. No pertinent surgical history. Prior Level of Function/Environment/Context: some assist give by family unsure of level of assist with ADLs, was Mod I with RW at times previously Occupations in which the patient is/was successful, what are the barriers preventing that success:  
Performance Patterns (routines, roles, habits, and rituals):  
Personal Interests and/or values:  
Expanded or extensive additional review of patient history: Parkinson's disease, HTN, DM, DVTs Home Situation Home Environment: Private residence # Steps to Enter: 2 Rails to Enter: Yes Hand Rails : Right One/Two Story Residence: One story Living Alone: No 
Support Systems: Family member(s) (daughter works) Patient Expects to be Discharged to[de-identified] Private residence Current DME Used/Available at Home: Walker, rolling 
[x]  Right hand dominant   []  Left hand dominant EXAMINATION OF PERFORMANCE DEFICITS: 
Cognitive/Behavioral Status: 
Neurologic State: Alert;Confused Orientation Level: Disoriented to situation Cognition: Poor safety awareness Safety/Judgement: Lack of insight into deficits Skin: intact Edema: none Hearing: Auditory Auditory Impairment: Hard of hearing, bilateral 
 
Vision/Perceptual:   
    
    
    
  
    
    
  
 
Range of Motion: 
 B UE 
AROM: Within functional limits Strength: 
B UE 3/5 incomplete ROM limiting Strength: Generally decreased, functional 
  
  
  
  
 
Coordination: 
Coordination: Generally decreased, functional 
Fine Motor Skills-Upper: Left Intact; Right Intact Tone & Sensation: 
 
Tone: Normal 
Sensation: Intact Balance: 
Sitting: Intact Sitting - Static: Fair (occasional) Standing: Impaired Standing - Static: Fair Standing - Dynamic : Fair Functional Mobility and Transfers for ADLs: 
Bed Mobility: 
Rolling: Moderate assistance Supine to Sit: Moderate assistance Sit to Supine: Moderate assistance Transfers: 
Sit to Stand: Moderate assistance;Assist x2; Additional time Stand to Sit: Moderate assistance;Assist x1;Additional time Bed to Chair: Additional time;Assist x1;Minimum assistance Toilet Transfer : Moderate assistance;Assist x1;Additional time ADL Assessment: 
Feeding: Independent Oral Facial Hygiene/Grooming: Setup Bathing: Moderate assistance Upper Body Dressing: Minimum assistance Lower Body Dressing: Total assistance Toileting: Total assistance ADL Intervention and task modifications: 
  
Completed OT evaluation and ADLs seated EOB and standing as able with max A and then mod A x2 for balance. Educated on safety and endurance training with encouragement for full participation in ADLs while in hospital. Good understanding noted. Completed ambulation with Rw to bathroom with increased time for sequencing and initiation Cognitive Retraining Safety/Judgement: Lack of insight into deficits Therapeutic Exercise: 
.toaco 
  
Functional Measure: 
Barthel Index: 
 
Bathin Bladder: 0 Bowels: 10 
Groomin Dressin Feedin Mobility: 0 Stairs: 0 Toilet Use: 0 Transfer (Bed to Chair and Back): 5 Total: 30 Barthel and G-code impairment scale: 
Percentage of impairment CH 
0% CI 
1-19% CJ 
20-39% CK 
40-59% CL 
60-79% CM 
80-99% CN 
100% Barthel Score 0-100 100 99-80 79-60 59-40 20-39 1-19 
 0 Barthel Score 0-20 20 17-19 13-16 9-12 5-8 1-4 0 The Barthel ADL Index: Guidelines 1. The index should be used as a record of what a patient does, not as a record of what a patient could do. 2. The main aim is to establish degree of independence from any help, physical or verbal, however minor and for whatever reason. 3. The need for supervision renders the patient not independent.  
4. A patient's performance should be established using the best available evidence. Asking the patient, friends/relatives and nurses are the usual sources, but direct observation and common sense are also important. However direct testing is not needed. 5. Usually the patient's performance over the preceding 24-48 hours is important, but occasionally longer periods will be relevant. 6. Middle categories imply that the patient supplies over 50 per cent of the effort. 7. Use of aids to be independent is allowed. Newton Lawrence., Barthel, DYosvanyW. (8475). Functional evaluation: the Barthel Index. [address] [facility] (14)2. [facility], Cy Wilburn., Pepe Nices., [place], [facility] (1999). Measuring the change indisability after inpatient rehabilitation; comparison of the responsiveness of the Barthel Index and Functional [place] Measure. Journal of Neurology, Neurosurgery, and Psychiatry, 66(4), 940-989. Landen Silva, [place].A, STEPH Acevedo, & Vale Monson MYosvanyA. (2004.) Assessment of post-stroke quality of life in cost-effectiveness studies: The usefulness of the Barthel Index and the EuroQoL-5D. [facility], 13, 167-85 G codes: In compliance with CMSs Claims Based Outcome Reporting, the following G-code set was chosen for this patient based on their primary functional limitation being treated: The outcome measure chosen to determine the severity of the functional limitation was the barthel index with a score of 30/100 which was correlated with the impairment scale. ? Self Care:  
  - CURRENT STATUS: CL - 60%-79% impaired, limited or restricted  - GOAL STATUS: CK - 40%-59% impaired, limited or restricted  - D/C STATUS:  ---------------To be determined--------------- Occupational Therapy Evaluation Charge Determination History Examination Decision-Making MEDIUM Complexity : Expanded review of history including physical, cognitive and psychosocial  history  MEDIUM Complexity : 3-5 performance deficits relating to physical, cognitive , or psychosocial skils that result in activity limitations and / or participation restrictions MEDIUM Complexity : Patient may present with comorbidities that affect occupational performnce. Miniml to moderate modification of tasks or assistance (eg, physical or verbal ) with assesment(s) is necessary to enable patient to complete evaluation Based on the above components, the patient evaluation is determined to be of the following complexity level: MEDIUM Pain: 
Pain Scale 1: Numeric (0 - 10) Pain Intensity 1: 0 Activity Tolerance:  
fair Please refer to the flowsheet for vital signs taken during this treatment. After treatment:  
[] Patient left in no apparent distress sitting up in chair 
[x] Patient left in no apparent distress in bed 
[x] Call bell left within reach [x] Nursing notified 
[] Caregiver present [x] Bed alarm activated COMMUNICATION/EDUCATION:  
The patients plan of care was discussed with: Physical Therapist and Registered Nurse. [x] Home safety education was provided and the patient/caregiver indicated understanding. [x] Patient/family have participated as able in goal setting and plan of care. [x] Patient/family agree to work toward stated goals and plan of care. [] Patient understands intent and goals of therapy, but is neutral about his/her participation. [] Patient is unable to participate in goal setting and plan of care. This patients plan of care is appropriate for delegation to [facility]. Thank you for this referral. 
Kunal Rice OT Time Calculation: 31 mins

## 2018-01-10 NOTE — WOUND CARE
Wound care consult: 
Initial visit, nurse consult for skin assessment, alert, no distress. Staff at bedside. Assessment All skin folds and bony prominences assessed, turned with staff assistance. Incontinent of urine, sacral crease scarred, moist and intact, blanching redness. Heels and feet are dry with large dry intact dark callus on the left medial heel, right heel is intact, small dry stable scab, redness blanching. All areas present on admission. Treatment Incontinent care given with 
Repositioned in bed Heels floated; heels and feet are moisturized Recommendations/Plan Nutrition, mobility team consults. Turn, reposition every 2 hours as tolerated, float heels, place in prevalon boots as tolerated. Incontinent care with comfort shields. Apply Zinc to all open areas, moisture barrier as needed. Will follow, reconsult as needed.  
Paige Rg

## 2018-01-10 NOTE — PROGRESS NOTES
Nutrition Assessment: 
 
RECOMMENDATIONS/INTERVENTION(S):  
Continue Consistent Carb/2gmNa diet Monitor PO intakes, wt, GI status Add Ensure HP for snacks- vanilla ASSESSMENT:  
1/10: 80 yr old male admitted for AMS, CVA? PMHx; Parkinsons Disease, DM, HTN. Pt currently on 2gm Na/DM consistent carb diet. Pt slow in responses. Pt not really sure how appetite has been in the recent past. Unsure how much lunch he had eaten. Pt states he has a history of constipation but he had a BM today. Pt states he needs to get back to exercising. Pt has left heel skin breakdown. Not p/i. See  Wound care note for details. No significant wt loss recently. No edema noted. Good muscle tone for 80 yr old. Pt doesn't have bottom teeth. States he usually eats soups and \"chicken pot pie\" and \"softer foods. \" Pt declined softer food diet, he will choose softer foods he can eat. Labs: , 89 mg/dL. BUN 28 SUBJECTIVE/OBJECTIVE:  
Diet Order: Consistent carb (2gm Na) 
% Eaten:  No data found. Pertinent Medications: [x] Reviewed Past Medical History:  
Diagnosis Date  Arthritis 5/5/2010  Diabetes mellitus type 2, diet-controlled ([address]  DVT (deep venous thrombosis) ([address] right LE DVT (mostly distal and non-occlusive) discovered 6/15  
 HTN (hypertension) 5/5/2010  
 hypertensive heart disease (LVH)  Hypercholesteremia 5/5/2010  in 4/09  Orthostatic hypotension Complaints of dizziness.  (lying) to 120 (standing) in 3/18/11.  Parkinson disease ([address] diagnosed around middle of may 2015 Chemistries: 
Lab Results Component Value Date/Time  Sodium 140 01/10/2018 01:14 AM  
 Potassium 4.3 01/10/2018 01:14 AM  
 Chloride 105 01/10/2018 01:14 AM  
 CO2 28 01/10/2018 01:14 AM  
 Anion gap 7 01/10/2018 01:14 AM  
 Glucose 104 01/10/2018 01:14 AM  
 BUN 28 01/10/2018 01:14 AM  
 Creatinine 1.18 01/10/2018 01:14 AM  
 BUN/Creatinine ratio 24 01/10/2018 01:14 AM  
 GFR est AA >60 01/10/2018 01:14 AM  
 GFR est non-AA 59 01/10/2018 01:14 AM  
 Calcium 8.8 01/10/2018 01:14 AM  
 AST (SGOT) 25 01/08/2018 10:54 PM  
 Alk. phosphatase 88 01/08/2018 10:54 PM  
 Protein, total 7.8 01/08/2018 10:54 PM  
 Albumin 4.0 01/08/2018 10:54 PM  
 Globulin 3.8 01/08/2018 10:54 PM  
 A-G Ratio 1.1 01/08/2018 10:54 PM  
 ALT (SGPT) 10 01/08/2018 10:54 PM  
  
Anthropometrics: Height: 5' 10\" (177.8 cm) Weight: 74.3 kg (163 lb 12.8 oz) IBW (%IBW):   ( ) UBW (%UBW):   (  %) BMI: Body mass index is 23.5 kg/(m^2). This BMI is indicative of: 
 
 [] Underweight    [x] Normal    [] Overweight    []  Obesity    []  Extreme Obesity (BMI>40) Estimated Nutrition Needs (Based on): 1923 Kcals/day (FWI(3048Z2.8)) , 74 g (-82g/day(1.0-1.1g/kg)) Protein Carbohydrate: At Least 130 g/day  Fluids: 1900 mL/day Last BM: 1/10   []Active     []Hyperactive  []Hypoactive       [] Absent   BS- not documented. Skin:    [x] Intact   [] Incision  [] Breakdown   [] DTI   [] Tears/Excoriation/Abrasion  []Edema [] Other: Wt Readings from Last 30 Encounters:  
01/10/18 74.3 kg (163 lb 12.8 oz) 10/19/17 72.1 kg (159 lb) 04/11/17 80.3 kg (177 lb) 12/14/16 75.3 kg (166 lb) 08/04/16 77.1 kg (170 lb)  
06/17/16 75.8 kg (167 lb) 04/21/16 76.2 kg (168 lb) 02/18/16 79.4 kg (175 lb) 12/28/15 80.7 kg (178 lb) 12/22/15 79.4 kg (175 lb) 12/21/15 79 kg (174 lb 2.6 oz) 12/18/15 73.9 kg (163 lb) 12/02/15 81.2 kg (179 lb)  
06/17/15 78 kg (172 lb) 06/05/15 76.7 kg (169 lb) 06/05/15 76.7 kg (169 lb) 05/26/15 80.3 kg (177 lb) 04/01/15 82.1 kg (181 lb)  
03/11/15 81.2 kg (179 lb) 02/07/15 84.8 kg (187 lb) 02/06/15 86.2 kg (190 lb)  
01/23/15 82.6 kg (182 lb) 10/08/14 83.5 kg (184 lb)  
09/29/14 83.2 kg (183 lb 6.4 oz) 07/15/14 83.5 kg (184 lb)  
06/24/14 83 kg (183 lb) 04/15/14 83 kg (183 lb) 04/11/14 81.6 kg (180 lb)  
07/22/13 83.4 kg (183 lb 12.8 oz) 09/30/12 83.9 kg (185 lb) NUTRITION DIAGNOSES:  
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 
Meals/Snacks: General/healthful diet   Supplements: Commercial supplement GOAL:  
Pt will consume >50% of meals and ONS within 3-5 days Cultural, Zoroastrian, or Ethnic Dietary Needs: None LEARNING NEEDS (Diet, Food/Nutrient-Drug Interaction):  
 [x] None Identified 
 [] Identified and Education Provided/Documented 
 [] Identified and Pt declined/was not appropriate [x] Interdisciplinary Care Plan Reviewed/Documented  
 [x] Discharge Needs:    TBD [] No Nutrition Related Discharge Needs NUTRITION RISK:  
Pt Is At Nutrition Risk  [x] No Nutrition Risk Identified  [] PT SEEN FOR:  
 [x]  MD Consult: []Calorie Count []Diabetic Diet Education []Diet Education []Electrolyte Management 
   [x]General Nutrition Management and Supplements []Management of Tube Feeding []TPN Recommendations []  RN Referral:  []MST score >=2 
   []Enteral/Parenteral Nutrition PTA []Pregnant: Gestational DM or Multigestation  
              [] Pressure Ulcer 
   
[]  Low BMI      []  Length of Stay       [] Dysphagia Diet     [] Ventilator      [] Follow-Up Previous Recommendations: 
 [] Implemented          [] Not Implemented          [x] Not Applicable Previous Goal: 
 [] Met              [] Progressing Towards Goal              [] Not Progressing Towards Goal   [x] Not Applicable Leopold Lunger, RD Pager: [phone] Office: [phone]

## 2018-01-10 NOTE — PROGRESS NOTES
Problem: Falls - Risk of 
Goal: *Absence of Falls Document Gilberto Peñaloza Fall Risk and appropriate interventions in the flowsheet. Outcome: Progressing Towards Goal 
Fall Risk Interventions: 
Mobility Interventions: Bed/chair exit alarm, Communicate number of staff needed for ambulation/transfer Mentation Interventions: Bed/chair exit alarm, Door open when patient unattended, Update white board, Room close to nurse's station, More frequent rounding, Reorient patient Medication Interventions: Bed/chair exit alarm, Teach patient to arise slowly, Patient to call before getting OOB Elimination Interventions: Bed/chair exit alarm, Call light in reach, Patient to call for help with toileting needs, Urinal in reach

## 2018-01-10 NOTE — PROGRESS NOTES
TRANSFER - IN REPORT: 
 
Verbal report received from [facility] Tamiko ELDRIDGE(name) on Dante Jain  being received from ED (unit) for routine progression of care Report consisted of patients Situation, Background, Assessment and  
Recommendations(SBAR). Information from the following report(s) SBAR, Kardex and MAR. was reviewed with the receiving nurse. Opportunity for questions and clarification was provided. Assessment completed upon patients arrival to unit and care assumed. Primary Nurse Lala Burns, RAMANDEEP and Dieudonne Butts RN performed a dual skin assessment on this patient Impairment noted- see wound doc flow sheet (excoriasion, redness on the sacrum, dry skin on heels, bruises) Song score is 18. 
 
0130 Pt had no diet order, called MD Jeff. She said she will put some order in. Pt wants to have a snack. Pt BP is high, 188/106 right arm, 184/111 left arm. 
 
0255 Hydralazine administered. Pt seams very hungry. Crackers and peanut butter offered. Patient Vitals for the past 4 hrs: 
 Temp Pulse Resp BP SpO2  
01/10/18 0225 - - - 139/69 -  
01/10/18 0217 - - - (!) 159/93 -  
01/10/18 0140 - - - (!) 184/111 -  
01/10/18 0134 - - - (!) 188/106 -  
01/10/18 0053 98.8 °F (37.1 °C) 83 17 158/87 97 % 01/10/18 0052 - 85 - - -  
   
0411 Called Md Serrano for low BP. He said he will come to assess pt, retake BP in 20 min. Patient Vitals for the past 4 hrs: 
 Temp Pulse Resp BP SpO2  
01/10/18 0352 98 °F (36.7 °C) 83 18 99/55 98 % 01/10/18 0231 - 83 - 121/67 -  
01/10/18 0225 - - - 139/69 -  
01/10/18 0217 - - - (!) 159/93 -  
01/10/18 0140 - - - (!) 184/111 -  
01/10/18 0134 - - - (!) 188/106 -  
01/10/18 0053 98.8 °F (37.1 °C) 83 17 158/87 97 % 01/10/18 0052 - 80 - - -  
MD Hugo Arauz wants to have orthostatic hypotension. Pt wants to do it later. Bedside and Verbal shift change report given to Manish Palumbo (oncoming nurse) by Joaquina Malloy RN (offgoing nurse).  Report included the following information SBAR, Ricardo and MARYosvany

## 2018-01-10 NOTE — PROGRESS NOTES
Progress Note 
 
                                  , MD, [facility] 
 
                                                              [address], [place], [address] [facility] Phone [phone]; Fax [phone] 
 
 
 
1/10/2018 9:57 AM  
 
Admit Date:           2018 Admit Diagnosis:  Elevated troponin Accelerated hypertension Altered mental status UTI (urinary tract infection) Encephalopathy UTI (urinary tract infection) :          1935 MRN:          [id number] ASSESSMENT/RECOMMENDATION:  
1Altered mental status/slurred speech History of hypertension/heart failure with preserved ejection fraction (chronic diastolic heart failure): echo shows 50%. -labetalol dosed increased and losartan d/c' BP better this will continue to monitor Troponin elevation minimal elevation . 0.26, not ACS related History of orthostatic hypotension History of DVT-on anticoagulation on eliquis 2.5 mg BID, michael dosed CKD-stage II: creatinine stable/WNL CARDIOLOGY ATTENDING Patient personally seen and examined. All the elements of history and examination were personally performed. Assessment and plan was discussed and agree as written above Mr. Trinity Monroe has a long history of labile HTN with orthostatic problems. Would continue with labetalol for BP control and keep him off losartan. Adjust labetalol as needed. Will order Jobst stockings. Needs PT. Agree with cutting back lasix due to orthostasis - may need to stop lasix altogether if orthostasis continues. Payton Pardo MD, [facility] Echo 17 ELVEF 50 %. LA moderately to severely dilated. Mild MR Last 3 Recorded Weights in this Encounter 18 1017 01/10/18 8931 Weight: 170 lb (77.1 kg) 163 lb 12.8 oz (74.3 kg)  1901 - 01/10 0700 In: 2696.7 [I.V.:2696.7] Out: 600 [Urine:600] SUBJECTIVE  
  
  
 
 Elio Gonzalez denies chest pain or SOB. Alert to place and month. Current Facility-Administered Medications Medication Dose Route Frequency  potassium, sodium phosphates (NEUTRA-PHOS) packet 1 Packet  1 Packet Oral QID  furosemide (LASIX) tablet 20 mg  20 mg Oral DAILY  apixaban (ELIQUIS) tablet 2.5 mg  2.5 mg Oral BID  labetalol (NORMODYNE) tablet 300 mg  300 mg Oral BID  simvastatin (ZOCOR) tablet 40 mg  40 mg Oral QHS  sertraline (ZOLOFT) tablet 50 mg  50 mg Oral DAILY  sodium chloride (NS) flush 5-10 mL  5-10 mL IntraVENous Q8H  
 sodium chloride (NS) flush 5-10 mL  5-10 mL IntraVENous PRN  
 glucose chewable tablet 16 g  4 Tab Oral PRN  
 dextrose (D50W) injection syrg 12.5-25 g  12.5-25 g IntraVENous PRN  
 glucagon (GLUCAGEN) injection 1 mg  1 mg IntraMUSCular PRN  
 insulin lispro (HUMALOG) injection   SubCUTAneous AC&HS  cefTRIAXone (ROCEPHIN) 1 g in 0.9% sodium chloride (MBP/ADV) 50 mL  1 g IntraVENous Q24H  carbidopa-levodopa (SINEMET)  mg per tablet 1 Tab  1 Tab Oral TID OBJECTIVE Intake/Output Summary (Last 24 hours) at 01/10/18 0957 Last data filed at 01/10/18 8765 Gross per 24 hour Intake          2696.67 ml Output              600 ml Net          2096.67 ml Review of Systems - History obtained from the patient AS PER  HPI Telemetry NSR 
 
PHYSICAL EXAM  
  
 
Visit Vitals  /80 (BP 1 Location: Right arm, BP Patient Position: At rest)  Pulse 81  Temp 98.7 °F (37.1 °C)  Resp 16  
 Ht 5' 10\" (1.778 m)  Wt 163 lb 12.8 oz (74.3 kg)  SpO2 97%  BMI 23.5 kg/m2 Gen: Well-developed, in no acute distress  alert and oriented x 2 (place and self) HEENT:  Pink conjunctivae, Hearing grossly normal.No scleral icterus or conjunctival, moist mucous membranes Neck: Supple,No JVD,   No cervical lymphadenopathy Resp: No accessory muscle use, Clear breath sounds, No rales or rhonchi 
Card: Regular Rate,Rythm,  No murmurs, rubs or gallop. No thrills. GI:          soft, non-tender MSK: No cyanosis or clubbing, good capillary refill Skin: No rashes or ulcers, no bruising Neuro:  Cranial nerves are grossly intact, moving all four extremities, no focal deficit, follows commands appropriately Psych:  Good insight, oriented to person, place and time, alert, Nml Affect LE: No edema, SCDs on 
  
 
DATA REVIEW Laboratory and Imaging have been reviewed by me and are notable for Recent Labs 01/09/18 
 1012  01/09/18 
 0435  01/08/18 2254 TROIQ  0.26*  0.18*  0.20* Recent Labs  
   01/10/18 
 0114  01/09/18 
 1012  01/09/18 
 0435  01/08/18 
 2254 NA  140   --   149*  143  
K  4.3   --   3.4*  4.1  
CO2  28   --   28  30 BUN  28*   --   28*  32* CREA  1.18   --   1.61*  2.08* GLU  104*   --   89  54* PHOS  2.8  3.0   --    --   
MG  1.8  1.8   --    --   
WBC  4.6   --   5.0  4.4 HGB  11.0*   --   11.2*  12.9 HCT  33.2*   --   34.1*  40.2 PLT  163   --   171  176 Leonel Orantes NP

## 2018-01-10 NOTE — PROGRESS NOTES
Problem: Mobility Impaired (Adult and Pediatric) Goal: *Acute Goals and Plan of Care (Insert Text) Physical Therapy Goals Initiated 1/9/2018 1. Patient will move from supine to sit and sit to supine  in bed with minimal assistance/contact guard assist within 7 day(s). 2.  Patient will transfer from bed to chair and chair to bed with moderate assistance  using the least restrictive device within 7 day(s). 3.  Patient will perform sit to stand with moderate assistance  within 7 day(s). 4.  Patient will ambulate with moderate assistance  for 50 feet with the least restrictive device within 7 day(s). 5.  Patient will ascend/descend 2 stairs with 1 handrail(s) with minimal assistance/contact guard assist within 7 day(s). physical Therapy TREATMENT Patient: Ashley Suh (28 y.o. male) Date: 1/10/2018 Diagnosis: Elevated troponin Accelerated hypertension Altered mental status UTI (urinary tract infection) Encephalopathy UTI (urinary tract infection) Orthostatic hypotension Precautions: Fall ASSESSMENT: 
Pt received in supine, alert and oriented to place and person only. Pt has a productive cough and reports no pain or shortness of breath. Brief was applied as Pt has difficulty with incontinence at times. Pt stood with moderate assist x1 and rolling walker with severe posterior lean and had to sit edge of bed. Pt later stood with moderate assist x2 and ambulated with short, shuffled steps,narrow base of support and some path deviation, with festinating gait and frequent freezing. Verbal and tactile cues given to facilitate gait. Pt took one seated rest as his legs were tired. Pt did not desaturate with activity on room air and heart rate remained in the 70's. Pt has decreased balance, decreased endurance and is at high risk for falls. Pt would benefit from rehab stay before returning home where he was ambulating with a straight cane and independent in ADL's.  Pt was instructed in ELIZABETH HELLER exercises and fall prevention and was left supine in bed with bed alarm activated and nurse informed. Progression toward goals: 
[x]    Improving appropriately and progressing toward goals 
[]    Improving slowly and progressing toward goals 
[]    Not making progress toward goals and plan of care will be adjusted PLAN: 
Patient continues to benefit from skilled intervention to address the above impairments. Continue treatment per established plan of care. Discharge Recommendations:  Rehab Further Equipment Recommendations for Discharge:  none SUBJECTIVE:  
Patient stated Sadaf Verdin can try to get up.  OBJECTIVE DATA SUMMARY:  
Critical Behavior: 
Neurologic State: Alert, Confused Orientation Level: Oriented to person, Oriented to place Cognition: Decreased attention/concentration, Decreased command following Functional Mobility Training: 
Bed Mobility: 
Rolling: Additional time;Assist x1; Moderate assistance Supine to Sit: Assist x1;Additional time; Moderate assistance Sit to Supine: Assist x2; Additional time; Moderate assistance Transfers: 
Sit to Stand: Assist x2; Additional time; Moderate assistance Stand to Sit: Assist x2; Additional time;Minimum assistance Stand Pivot Transfers: Assist x2 Bed to Chair: Additional time; Moderate assistance Balance: 
Sitting: Intact Sitting - Static: Good (unsupported) Standing: Impaired Standing - Static: Fair Standing - Dynamic : Fair Ambulation/Gait Training: 
Distance (ft): 70 Feet (ft) Assistive Device: Gait belt;Walker, rolling Ambulation - Level of Assistance: Assist x2; Moderate assistance;Minimal assistance Gait Abnormalities: Decreased step clearance; Festinating gait; Shuffling gait;Scissoring;Path deviations Base of Support: Narrowed Speed/[place]: Fluctuations; Slow Step Length: Right shortened;Left shortened Stairs: 
  
  
   
Neuro Re-Education: 
Worked on static and dynamic standing balance Therapeutic Exercises:  
Instructed in ankle pumps and heel glides and encouraged to exercise hourly Pain: 
Pain Scale 1: Numeric (0 - 10) Pain Intensity 1: 0 Activity Tolerance:  
fair Please refer to the flowsheet for vital signs taken during this treatment. After treatment:  
[]    Patient left in no apparent distress sitting up in chair 
[x]    Patient left in no apparent distress in bed 
[x]    Call bell left within reach [x]    Nursing notified 
[]    Caregiver present [x]    Bed alarm activated COMMUNICATION/COLLABORATION:  
The patients plan of care was discussed with: Registered Nurse Анна King, PT Time Calculation: 24 mins

## 2018-01-10 NOTE — PROGRESS NOTES
[facility] Neurology [address] [place] [address] 
[phone] 
  
Inpatient Neurology Progress Toña Hernandez [facility] Name: Charly Michaud : 1935 MRN: [id number] Date: 1/10/2018  
______________________________________________________________________ * I have reviewed flowsheet data, labs and previous day's notes. Subjective:   CC:  I came here with weakness and a urine problem ·  I don't fall often at home--I don't remember the last fall I had ·  I try to be careful and use my walker instead of my cane at home ·  I feel good today · No overnight events Objective:  
 
Vital Signs:   
Visit Vitals  /80 (BP 1 Location: Right arm, BP Patient Position: At rest)  Pulse 81  Temp 98.7 °F (37.1 °C)  Resp 16  
 Ht 5' 10\" (1.778 m)  Wt 74.3 kg (163 lb 12.8 oz)  SpO2 97%  BMI 23.5 kg/m2 O2 Device: Room air Temp (24hrs), Av.5 °F (36.9 °C), Min:98 °F (36.7 °C), Max:98.8 °F (37.1 °C) Intake/Output:  
 
Intake/Output Summary (Last 24 hours) at 01/10/18 0682 Last data filed at 01/10/18 0672 Gross per 24 hour Intake          2696.67 ml Output              600 ml Net          2096.67 ml Medications: 
Current Facility-Administered Medications Medication Dose Route Frequency  potassium, sodium phosphates (NEUTRA-PHOS) packet 1 Packet  1 Packet Oral QID  apixaban (ELIQUIS) tablet 2.5 mg  2.5 mg Oral BID  labetalol (NORMODYNE) tablet 300 mg  300 mg Oral BID  simvastatin (ZOCOR) tablet 40 mg  40 mg Oral QHS  sertraline (ZOLOFT) tablet 50 mg  50 mg Oral DAILY  sodium chloride (NS) flush 5-10 mL  5-10 mL IntraVENous Q8H  
 sodium chloride (NS) flush 5-10 mL  5-10 mL IntraVENous PRN  
 0.9% sodium chloride infusion  100 mL/hr IntraVENous CONTINUOUS  
 glucose chewable tablet 16 g  4 Tab Oral PRN  
 dextrose (D50W) injection syrg 12.5-25 g  12.5-25 g IntraVENous PRN  
 glucagon (GLUCAGEN) injection 1 mg  1 mg IntraMUSCular PRN  
 insulin lispro (HUMALOG) injection   SubCUTAneous AC&HS  cefTRIAXone (ROCEPHIN) 1 g in 0.9% sodium chloride (MBP/ADV) 50 mL  1 g IntraVENous Q24H  carbidopa-levodopa (SINEMET)  mg per tablet 1 Tab  1 Tab Oral TID Physical Exam:  
General: Well developed elderly AAM in NAD Lungs:    Clear to auscultation bilaterally       Cardiac: Regular rate and rhythm Neck: Supple, no bruits Extrem: no LE edema          Skin:  Intact, dry Neurological Exam:  awake, oriented x 3, conversant, appropriate · Eyes: tracking objects and making eye contact · Speech:  Bradyphrenia and no aphasia or dysarthria · Strength: throughout 5-/5 · Tremors: action bilat > on left--- noted fine motor tasks were difficult bilaterally when I saw him trying to prepare meal tray · Bradykinesia:  bilat > On L 
· Rigidity:  Bilaterally > on L 
· Masked facies:  mild · reflexes throughout 1+ and toes downgoing 
 
____________________________________________________________________ Labs:  
Recent Labs  
   01/10/18 
 0114 WBC  4.6 HGB  11.0*  
HCT  33.2*  
PLT  163 Recent Labs  
   01/10/18 
 0114  01/09/18 
 1012  01/09/18 
 0447  01/09/18 
 0435  01/08/18 
 2254 NA  140   --    --   149*  143  
K  4.3   --    --   3.4*  4.1 CL  105   --    --   109*  102 CO2  28   --    --   28  30 GLU  104*   --    --   89  54*  
BUN  28*   --    --   28*  32* CREA  1.18   --    --   1.61*  2.08* CA  8.8   --    --   7.6*  9.5 MG  1.8  1.8   --    --    --   
PHOS  2.8  3.0   --    --    --   
ALB   --    --    --    --   4.0 TBILI   --    --    --    --   0.5 SGOT   --    --    --    --   25 ALT   --    --    --    --   10* INR   --    --   1.1   --    -- No results for input(s): PH, PCO2, PO2, HCO3, FIO2 in the last 72 hours. Imaging: CT Results (recent): 
 
Results from Hospital Encounter encounter on 01/08/18 CT HEAD WO CONT Narrative EXAM:  CT HEAD WO CONT INDICATION: Altered mental status. COMPARISON: CT head 6/26/2011 TECHNIQUE: Axial noncontrast head CT from foramen magnum to vertex. Coronal and 
sagittal reformatted images were obtained. CT dose reduction was achieved 
through use of a standardized protocol tailored for this examination and 
automatic exposure control for dose modulation. FINDINGS:  There is diffuse age-related parenchymal volume loss. The ventricles 
and sulci are age-appropriate without hydrocephalus. There is no mass effect or 
midline shift. There is no intracranial hemorrhage or extra-axial fluid 
collection. Areas of low attenuation in the periventricular white matter 
represent progressive age-indeterminate microvascular ischemic changes. The 
gray-white matter differentiation is maintained. The basal cisterns are patent. The osseous structures are intact. There is complete opacification of the left 
frontal sinus and patchy opacification in the left greater than right ethmoid 
air cells. The remaining visualized paranasal sinuses and mastoid air cells are 
clear. Impression IMPRESSION:  
1. There is no acute intracranial hemorrhage. 2. Progressive age-indeterminate microvascular ischemic changes in the 
periventricular white matter. 3. Left frontal and ethmoid paranasal sinus inflammatory changes. MRI Results (recent): 
Results from Hospital Encounter encounter on 04/26/09 MRI BRAIN W AND W/O CONT Narrative **Final Report** 
 
 
ICD Codes / Adm. Diagnosis:    /   Nicklas Felt Examination:  BRAIN MRI Nahum Dan CONTRAST  - [id number] - Apr 26 2009  4:12PM 
 
Accession No:  [id number] Reason:   
 
REPORT: 
CLINICAL INDICATION: Dizziness. Technical factors: Diffusion imaging, sagittal T1-weighted axial T1-weighted  
pre- and post-20 cc IV Magnevist T2-weighted FLAIR gradient-echo coronal  
T1-weighted postcontrast sagittal FLAIR. No prior.  
There is mild atrophy and white matter disease finding which is nonspecific  
but likely represent small vessel ischemia. Diffusion imaging does not show  
acute ischemic changes. There is no hemorrhage extra-axial fluid collection  
shift or mass effect. There is no enhancing lesion or masses. IMPRESSION: Atrophy and white matter disease. No other significant findings. Interpreting/Reading Doctor: Odell Szymanski ([id number]) Transcribed: n/a on 04/26/2009 Martha Hathaway ([id number])  04/26/2009 Distribution:  Attending Doctor: Edna Yin Alternate Doctor: Edna Yin 
   
 
____________________________________________________________________ Review of Systems: Denies:  SOB, angina, visual changes, NV, HA, back pain Impression:  
Summary: Pt admitted on 1/8/2018 with weakness, reported slurred speech and found to have UTI in ED. 1.  Parkinson's Syndrome hx 
2. UTI Plan:  
 
 Carotids stable bilat 0-49%--- do not feel he had TIA PTA. Feel his speech variance was from hypotension PTA of 80/50 that was reported/documented in ED provider note and hx of PD with bradyphrenia. Called daughter Dandre Jones again today-- without ability to reach her. · Medications: continue Sinemet 25/250mg TID AC meals · Labs/testing: Carotids bilat 0-49% · Recommendations:  Continue PT/OT and note PT recommending HH or SNF. Question if patient is stable living in current living situation when he is at home alone during day while family is at work. · Have scheduled OP neuro f/u appt---patient has had OP neuro appts in past but has not attended---hopefully he will attend this appt for PD eval 
 
No further recommendations Will see PRN 
· Testing discussed with patient -- any questions answered. On DVT Prophylaxis yes no Continue Eliquis while inpatient x Care Plan discussed with: 
Patient x Family: called dtr Dandre Jones [phone]-- no success RN:  Brandon Montalvo x Care Manager Consultant/Specialist: My collaborating care team physician may have further recommendations. Patient will be discussed with Dr. Tila Baptiste 
______________________________________________________________ Hospital Problems  Date Reviewed: 1/9/2018 Codes Class Noted POA  
 UTI (urinary tract infection) ICD-10-CM: N39.0 ICD-9-CM: 599.0  1/9/2018 Yes Altered mental status ICD-10-CM: R41.82 
ICD-9-CM: 780.97  1/9/2018 Yes Elevated troponin ICD-10-CM: R74.8 ICD-9-CM: 790.6  1/9/2018 Yes History of DVT (deep vein thrombosis) ICD-10-CM: X63.768 ICD-9-CM: V12.51  4/11/2017 Yes Diastolic dysfunction with chronic heart failure ([facility] ICD-10-CM: I50.32 
ICD-9-CM: 428.32  12/21/2015 Yes Accelerated hypertension ICD-10-CM: I10 
ICD-9-CM: 401.0  12/18/2015 Yes Parkinsons disease ([facility].) ICD-10-CM: G20 
ICD-9-CM: 332.0  5/26/2015 Yes Diabetes mellitus type II, controlled ([facility].) ICD-10-CM: E11.9 ICD-9-CM: 250.00  1/26/2015 Yes * (Principal)Orthostatic hypotension (Chronic) ICD-10-CM: I95.1 ICD-9-CM: 458.0  Unknown Yes Overview Signed 5/12/2011  2:55 PM by Halina Paez MD  
  Complaints of dizziness.  (lying) to 120 (standing) in 3/18/11. HTN (hypertension) ICD-10-CM: I10 
ICD-9-CM: 401.9  3/19/2011 Yes Overview Signed 5/23/2011  8:08 AM by Quentin Alvarado MD  
  ECHO 5/2011 = LVH, EF 50% Hypotension, postural ICD-10-CM: I95.1 ICD-9-CM: 458.0  3/19/2011 Yes Overview Signed 7/6/2011  5:47 PM by Quentin Alvarado MD  
  Echo 2d adult 5/19/11  
mild-mod LVH, EF 50-55% (low normal). Mod LAE, mild MR.  
  
Nm cardiac spect w stress / rest mult 5/19/11  
no inducible ischemia; LVEF 45%  
  
  
   
  
 
 
 ================================================ 
---KESHAWN Skelton 
______________________________________________________________ ADDENDUM--> Collaborating Care Team Physician:

## 2018-01-10 NOTE — PROCEDURES
[facility]  *** FINAL REPORT ***    Name: Wisam Smalls  MRN: [id number]    Inpatient  : 23 Dec 1935  HIS Order #: [id number]  [id number] [facility] Visit #: [id number]  Date: 10 Selvin 2018    TYPE OF TEST: Cerebrovascular Duplex    REASON FOR TEST  Transient ischemic attacks    Right Carotid:-             Proximal               Mid                 Distal  cm/s  Systolic  Diastolic  Systolic  Diastolic  Systolic  Diastolic  CCA:     83.9       7.9                           101.1      13.9  Bulb:  ECA:    156.9       9.1  ICA:     53.4      12.8       51.2      12.8       61.1      19.3  ICA/CCA:  0.5       0.9    ICA Stenosis: <50%    Right Vertebral:-  Finding: Antegrade  Sys:       52.5  Charley:       16.3    Right Subclavian:    Left Carotid:-            Proximal                Mid                 Distal  cm/s  Systolic  Diastolic  Systolic  Diastolic  Systolic  Diastolic  CCA:    602.1      24.8                           129.1      20.9  Bulb:  ECA:    142.9      11.0  ICA:     57.7      17.6       49.9      16.4       74.5      25.4  ICA/CCA:  0.4       0.8    ICA Stenosis: <50%    Left Vertebral:-  Finding: Antegrade  Sys:       61.6  Charley:       20.2    Left Subclavian:    INTERPRETATION/FINDINGS  PROCEDURE:  Evaluation of the extracranial cerebrovascular arteries  with ultrasound (B-mode imaging, pulsed Doppler, color Doppler). Includes the common carotid, internal carotid, external carotid, and  vertebral arteries. FINDINGS: Intimal thickening observed in the CCA bilaterally. Mild  heterogeneous plaque noted in the right bulb and mild calcific plaque  noted in the left ICA. IMPRESSION: Findings are consistent with 0-49% stenosis of the right  internal carotid and 0-49% stenosis of the left internal carotid. Vertebrals are patent with antegrade flow. ADDITIONAL COMMENTS    I have personally reviewed the data relevant to the interpretation of  this  study.     TECHNOLOGIST: Delon Lala, RDCS  Signed: 01/10/2018 11:09 AM    PHYSICIAN: Chris Martinez.  Shira Sargent MD  Signed: 01/11/2018 04:38 PM

## 2018-01-10 NOTE — ED NOTES
Pt sleeping, VSS. Report given to RAMANDEEP Corbin. Bedside and Verbal shift change report given to RAMANDEEP Corbin (oncoming nurse) by James Bunn (offgoing nurse).  Report included the following information SBAR, Kardex, ED Summary, Intake/Output, MAR and Cardiac Rhythm SR. '

## 2018-01-11 LAB
ANION GAP SERPL CALC-SCNC: 3 MMOL/L (ref 5–15)
BUN SERPL-MCNC: 24 MG/DL (ref 6–20)
BUN/CREAT SERPL: 21 (ref 12–20)
CALCIUM SERPL-MCNC: 8.8 MG/DL (ref 8.5–10.1)
CHLORIDE SERPL-SCNC: 106 MMOL/L (ref 97–108)
CO2 SERPL-SCNC: 29 MMOL/L (ref 21–32)
CREAT SERPL-MCNC: 1.14 MG/DL (ref 0.7–1.3)
ERYTHROCYTE [DISTWIDTH] IN BLOOD BY AUTOMATED COUNT: 14.3 % (ref 11.5–14.5)
GLUCOSE BLD STRIP.AUTO-MCNC: 101 MG/DL (ref 65–100)
GLUCOSE BLD STRIP.AUTO-MCNC: 106 MG/DL (ref 65–100)
GLUCOSE BLD STRIP.AUTO-MCNC: 113 MG/DL (ref 65–100)
GLUCOSE BLD STRIP.AUTO-MCNC: 131 MG/DL (ref 65–100)
GLUCOSE SERPL-MCNC: 98 MG/DL (ref 65–100)
HCT VFR BLD AUTO: 31.5 % (ref 36.6–50.3)
HGB BLD-MCNC: 10.5 G/DL (ref 12.1–17)
MAGNESIUM SERPL-MCNC: 1.9 MG/DL (ref 1.6–2.4)
MCH RBC QN AUTO: 29.9 PG (ref 26–34)
MCHC RBC AUTO-ENTMCNC: 33.3 G/DL (ref 30–36.5)
MCV RBC AUTO: 89.7 FL (ref 80–99)
PHOSPHATE SERPL-MCNC: 3.7 MG/DL (ref 2.6–4.7)
PLATELET # BLD AUTO: 155 K/UL (ref 150–400)
POTASSIUM SERPL-SCNC: 4.1 MMOL/L (ref 3.5–5.1)
RBC # BLD AUTO: 3.51 M/UL (ref 4.1–5.7)
SERVICE CMNT-IMP: ABNORMAL
SODIUM SERPL-SCNC: 138 MMOL/L (ref 136–145)
WBC # BLD AUTO: 4.2 K/UL (ref 4.1–11.1)

## 2018-01-11 PROCEDURE — 74011250637 HC RX REV CODE- 250/637: Performed by: FAMILY MEDICINE

## 2018-01-11 PROCEDURE — 36415 COLL VENOUS BLD VENIPUNCTURE: CPT | Performed by: FAMILY MEDICINE

## 2018-01-11 PROCEDURE — 97535 SELF CARE MNGMENT TRAINING: CPT

## 2018-01-11 PROCEDURE — 74011250637 HC RX REV CODE- 250/637: Performed by: NURSE PRACTITIONER

## 2018-01-11 PROCEDURE — 83735 ASSAY OF MAGNESIUM: CPT | Performed by: FAMILY MEDICINE

## 2018-01-11 PROCEDURE — 74011250636 HC RX REV CODE- 250/636: Performed by: STUDENT IN AN ORGANIZED HEALTH CARE EDUCATION/TRAINING PROGRAM

## 2018-01-11 PROCEDURE — 97116 GAIT TRAINING THERAPY: CPT

## 2018-01-11 PROCEDURE — 80048 BASIC METABOLIC PNL TOTAL CA: CPT | Performed by: FAMILY MEDICINE

## 2018-01-11 PROCEDURE — 97530 THERAPEUTIC ACTIVITIES: CPT

## 2018-01-11 PROCEDURE — 84100 ASSAY OF PHOSPHORUS: CPT | Performed by: FAMILY MEDICINE

## 2018-01-11 PROCEDURE — 85027 COMPLETE CBC AUTOMATED: CPT | Performed by: FAMILY MEDICINE

## 2018-01-11 PROCEDURE — 74011000258 HC RX REV CODE- 258: Performed by: FAMILY MEDICINE

## 2018-01-11 PROCEDURE — 82962 GLUCOSE BLOOD TEST: CPT

## 2018-01-11 PROCEDURE — 65660000000 HC RM CCU STEPDOWN

## 2018-01-11 PROCEDURE — 74011250636 HC RX REV CODE- 250/636: Performed by: FAMILY MEDICINE

## 2018-01-11 RX ORDER — MAGNESIUM SULFATE 1 G/100ML
1 INJECTION INTRAVENOUS ONCE
Status: COMPLETED | OUTPATIENT
Start: 2018-01-11 | End: 2018-01-11

## 2018-01-11 RX ORDER — LOSARTAN POTASSIUM 50 MG/1
50 TABLET ORAL DAILY
Status: DISCONTINUED | OUTPATIENT
Start: 2018-01-11 | End: 2018-01-11

## 2018-01-11 RX ORDER — HYDRALAZINE HYDROCHLORIDE 20 MG/ML
10 INJECTION INTRAMUSCULAR; INTRAVENOUS ONCE
Status: COMPLETED | OUTPATIENT
Start: 2018-01-11 | End: 2018-01-11

## 2018-01-11 RX ORDER — LOSARTAN POTASSIUM 50 MG/1
50 TABLET ORAL DAILY
Status: DISCONTINUED | OUTPATIENT
Start: 2018-01-12 | End: 2018-01-11

## 2018-01-11 RX ADMIN — CARBIDOPA AND LEVODOPA 1 TABLET: 25; 250 TABLET ORAL at 16:47

## 2018-01-11 RX ADMIN — LABETALOL HCL 300 MG: 200 TABLET, FILM COATED ORAL at 18:00

## 2018-01-11 RX ADMIN — APIXABAN 2.5 MG: 2.5 TABLET, FILM COATED ORAL at 09:58

## 2018-01-11 RX ADMIN — HYDRALAZINE HYDROCHLORIDE 10 MG: 20 INJECTION INTRAMUSCULAR; INTRAVENOUS at 04:07

## 2018-01-11 RX ADMIN — SERTRALINE HYDROCHLORIDE 50 MG: 50 TABLET ORAL at 09:58

## 2018-01-11 RX ADMIN — LABETALOL HCL 300 MG: 200 TABLET, FILM COATED ORAL at 09:58

## 2018-01-11 RX ADMIN — APIXABAN 2.5 MG: 2.5 TABLET, FILM COATED ORAL at 18:00

## 2018-01-11 RX ADMIN — FUROSEMIDE 20 MG: 20 TABLET ORAL at 09:58

## 2018-01-11 RX ADMIN — POTASSIUM & SODIUM PHOSPHATES POWDER PACK 280-160-250 MG 1 PACKET: 280-160-250 PACK at 18:00

## 2018-01-11 RX ADMIN — POTASSIUM & SODIUM PHOSPHATES POWDER PACK 280-160-250 MG 1 PACKET: 280-160-250 PACK at 13:24

## 2018-01-11 RX ADMIN — CEFTRIAXONE SODIUM 1 G: 1 INJECTION, POWDER, FOR SOLUTION INTRAMUSCULAR; INTRAVENOUS at 22:05

## 2018-01-11 RX ADMIN — POTASSIUM & SODIUM PHOSPHATES POWDER PACK 280-160-250 MG 1 PACKET: 280-160-250 PACK at 22:05

## 2018-01-11 RX ADMIN — MAGNESIUM SULFATE HEPTAHYDRATE 1 G: 1 INJECTION, SOLUTION INTRAVENOUS at 06:12

## 2018-01-11 RX ADMIN — Medication 10 ML: at 13:25

## 2018-01-11 RX ADMIN — CARBIDOPA AND LEVODOPA 1 TABLET: 25; 250 TABLET ORAL at 09:58

## 2018-01-11 RX ADMIN — CARBIDOPA AND LEVODOPA 1 TABLET: 25; 250 TABLET ORAL at 22:05

## 2018-01-11 RX ADMIN — Medication 10 ML: at 22:05

## 2018-01-11 RX ADMIN — Medication 10 ML: at 06:12

## 2018-01-11 RX ADMIN — POTASSIUM & SODIUM PHOSPHATES POWDER PACK 280-160-250 MG 1 PACKET: 280-160-250 PACK at 09:58

## 2018-01-11 RX ADMIN — SIMVASTATIN 40 MG: 20 TABLET, FILM COATED ORAL at 22:05

## 2018-01-11 RX ADMIN — LOSARTAN POTASSIUM 50 MG: 50 TABLET ORAL at 16:47

## 2018-01-11 NOTE — PROGRESS NOTES
Problem: Falls - Risk of 
Goal: *Absence of Falls Document Andrea Sung Fall Risk and appropriate interventions in the flowsheet. Outcome: Progressing Towards Goal 
Fall Risk Interventions: 
Mobility Interventions: Bed/chair exit alarm, Communicate number of staff needed for ambulation/transfer, Patient to call before getting OOB Mentation Interventions: Bed/chair exit alarm, Adequate sleep, hydration, pain control, Door open when patient unattended, Reorient patient, Room close to nurse's station, Update white board Medication Interventions: Bed/chair exit alarm, Patient to call before getting OOB, Teach patient to arise slowly Elimination Interventions: Bed/chair exit alarm, Call light in reach, Toilet paper/wipes in reach, Patient to call for help with toileting needs, Toileting schedule/hourly rounds, Urinal in reach

## 2018-01-11 NOTE — PROGRESS NOTES
01/11/18 MSW following through on discharge planning for patient. TC to daughter (need to use 804 area code). She reported that patient was independent with his ADLS's and IADLS's up until this admission. He has a cane and a walker but hardly uses either. She works so the patient was staying alone up until 1:00 when his grandchildren came home from school. Patient has used [facility] in the past. Discussed discharged plans. Daughter feels he would benefit from inpatient SNF for rehab and choice [facility] as the patient was there a year ago. Referral made through [employer] and [facility]. Michelle Jones MSW

## 2018-01-11 NOTE — PROGRESS NOTES
Problem: Self Care Deficits Care Plan (Adult) Goal: *Acute Goals and Plan of Care (Insert Text) Occupational Therapy Goals Initiated 1/10/2018 1. Patient will perform self-feeding with modified independence within 7 day(s). 2.  Patient will perform upper body dressing with modified independence within 7 day(s). 3.  Patient will perform lower body dressing with minimal assistance/contact guard assist within 7 day(s). 4.  Patient will perform toilet transfers with minimal assistance/contact guard assist within 7 day(s). 5.  Patient will perform all aspects of toileting with minimal assistance/contact guard assist within 7 day(s). 6.  Patient will participate in upper extremity therapeutic exercise/activities with modified independence for 5 minutes within 7 day(s). 7.  Patient will utilize energy conservation techniques during functional activities with verbal cues within 7 day(s). Occupational Therapy TREATMENT Patient: Katherine El (89 y.o. male) Date: 1/11/2018 Diagnosis: Elevated troponin Accelerated hypertension Altered mental status UTI (urinary tract infection) Encephalopathy UTI (urinary tract infection) Orthostatic hypotension Precautions: Fall Chart, occupational therapy assessment, plan of care, and goals were reviewed. ASSESSMENT: 
Pt agreeable to OT. After sitting edge of bed, pt washed his UB with min assist for his back, min assist to [facility]/don hospital gown. Pt stood with min assist with height of bed raised. After sit to stand pt ambulated to bathroom and transferred to commode with moderate assist and additional time, verbal cueing for safety. Pt dep for hygiene and watery stool. RN notified. Pt stated Shameka Comings is afraid he is going to fall. \" Pt returned to sit in recliner chair. Moderate assist for sit to stand and to apply brief. Pt left to work with PT. Recommend SNF for rehab.  
Progression toward goals: 
[]          Improving appropriately and progressing toward goals [x]          Improving slowly and progressing toward goals 
[]          Not making progress toward goals and plan of care will be adjusted PLAN: 
Patient continues to benefit from skilled intervention to address the above impairments. Continue treatment per established plan of care. Discharge Recommendations:  SNF for rehab. Further Equipment Recommendations for Discharge: None SUBJECTIVE:  
Patient stated I need to use the bathroom.  OBJECTIVE DATA SUMMARY:  
Cognitive/Behavioral Status: 
  
Orientation Level: Disoriented to time Cognition: Follows commands Functional Mobility and Transfers for ADLs: 
 Bed Mobility: 
  
 Min assist supine to sit. Transfers: 
  
Functional Transfers Toilet Transfer : Moderate assistance; Adaptive equipment;Assist x1 Cues: Physical assistance;Verbal cues provided Balance: Impaired standing balance. ADL Intervention: 
  
 
  
 
Upper Body Bathing Bathing Assistance: Minimum assistance Position Performed: Seated edge of bed Cues: Physical assistance Upper Body Dressing Assistance Dressing Assistance: Minimum assistance Hospital Gown: Minimum  assistance Pain: 
Pain Scale 1: Numeric (0 - 10) Pain Intensity 1: 0 Activity Tolerance:   
Fair Please refer to the flowsheet for vital signs taken during this treatment. After treatment:  
[x]  Patient left in no apparent distress sitting up in chair with PT 
[]  Patient left in no apparent distress in bed 
[x]  Call bell left within reach [x]  Nursing notified 
[]  Caregiver present 
[]  Bed alarm activated COMMUNICATION/COLLABORATION:  
The patients plan of care was discussed with: Physical Therapist, Occupational Therapist and Registered Nurse BRAYDEN Khoury Time Calculation: 39 mins

## 2018-01-11 NOTE — PROGRESS NOTES
Pt was washed up with soup and water. Changed gown and linen. Pt /92 Called MD Jeff, she said she will order Hydralazine. Patient Vitals for the past 12 hrs: 
 Temp Pulse Resp BP SpO2  
01/11/18 0617 - 76 - 126/68 -  
01/11/18 0452 - 80 - 141/73 -  
01/11/18 0441 - 76 - 143/76 -  
01/11/18 0328 98.3 °F (36.8 °C) 78 18 (!) 173/92 97 % 01/10/18 2343 98.1 °F (36.7 °C) 77 17 159/89 98 % 01/10/18 2328 99.1 °F (37.3 °C) 77 18 155/88 98 % 01/10/18 2309 - 76 - - - Bedside and Verbal shift change report given to Manish Palumbo (oncoming nurse) by Kain Odom RN (offgoing nurse). Report included the following information SBAR, Kardex and MAR.

## 2018-01-11 NOTE — PROGRESS NOTES
Problem: Falls - Risk of 
Goal: *Absence of Falls Document Gilberto Peñaloza Fall Risk and appropriate interventions in the flowsheet. Outcome: Progressing Towards Goal 
Fall Risk Interventions: 
Mobility Interventions: Bed/chair exit alarm, Communicate number of staff needed for ambulation/transfer, Patient to call before getting OOB Mentation Interventions: Bed/chair exit alarm, Adequate sleep, hydration, pain control, Door open when patient unattended, Reorient patient, Room close to nurse's station, Update white board Medication Interventions: Bed/chair exit alarm, Patient to call before getting OOB, Teach patient to arise slowly Elimination Interventions: Bed/chair exit alarm, Call light in reach, Patient to call for help with toileting needs, Urinal in reach

## 2018-01-11 NOTE — PROGRESS NOTES
Problem: Mobility Impaired (Adult and Pediatric) Goal: *Acute Goals and Plan of Care (Insert Text) Physical Therapy Goals Initiated 1/9/2018 1. Patient will move from supine to sit and sit to supine  in bed with minimal assistance/contact guard assist within 7 day(s). 2.  Patient will transfer from bed to chair and chair to bed with moderate assistance  using the least restrictive device within 7 day(s). 3.  Patient will perform sit to stand with moderate assistance  within 7 day(s). 4.  Patient will ambulate with moderate assistance  for 50 feet with the least restrictive device within 7 day(s). 5.  Patient will ascend/descend 2 stairs with 1 handrail(s) with minimal assistance/contact guard assist within 7 day(s). physical Therapy TREATMENT Patient: Nael Vora (63 y.o. male) Date: 1/11/2018 Diagnosis: Elevated troponin Accelerated hypertension Altered mental status UTI (urinary tract infection) Encephalopathy UTI (urinary tract infection) Orthostatic hypotension Precautions: Fall ASSESSMENT: 
Based on the objective data described below, patient participated well with treatment today. Sit to stand min assist, ambulate with rolling walker out on the [facility] hallway min assist, OOB to chair as tolerated performed some active range of motion exercise on both LE all planes. Educate and  Instructed patient to continue active range of motion exercise on both legs while up on chair or on bed. Notified nurse who agreed to monitor and assist back to bed when ask. Progression toward goals: 
[x]    Improving appropriately and progressing toward goals 
[]    Improving slowly and progressing toward goals 
[]    Not making progress toward goals and plan of care will be adjusted PLAN: 
Patient continues to benefit from skilled intervention to address the above impairments. Continue treatment per established plan of care.  
Discharge Recommendations:  Home Health and To Be Determined pending progress from therapy Further Equipment Recommendations for Discharge:  TBD SUBJECTIVE:  
Patient stated ok we can try to walk out on the hallway.  OBJECTIVE DATA SUMMARY:  
Critical Behavior: 
Neurologic State: Alert, Confused Orientation Level: Disoriented to time Cognition: Follows commands Safety/Judgement: Lack of insight into deficits Functional Mobility Training: 
Bed Mobility: 
Rolling: Minimum assistance Supine to Sit: Minimum assistance Sit to Supine: Minimum assistance Scooting: Minimum assistance Transfers: 
Sit to Stand: Minimum assistance Stand to Sit: Minimum assistance Stand Pivot Transfers: Minimum assistance Bed to Chair: Minimum assistance Balance: 
Sitting: Intact Sitting - Static: Good (unsupported) Standing: Impaired; With support;Pull to stand Standing - Static: Fair Standing - Dynamic : Fair Ambulation/Gait Training: 
Distance (ft): 40 Feet (ft) Assistive Device: Walker, rolling;Gait belt Ambulation - Level of Assistance: Contact guard assistance Gait Description (WDL): Exceptions to [facility] Gait Abnormalities: Path deviations Base of Support: Narrowed Speed/[place]: Fluctuations Step Length: Right shortened;Left shortened Therapeutic Exercises:  
 Instructed patient to continue active range of motion exercise on both legs while up on chair or on bed. Pain: 
Pain Scale 1: Numeric (0 - 10) Pain Intensity 1: 0 Activity Tolerance:  
Good. Please refer to the flowsheet for vital signs taken during this treatment. After treatment:  
[x]    Patient left in no apparent distress sitting up in chair 
[]    Patient left in no apparent distress in bed 
[x]    Call bell left within reach [x]    Nursing notified 
[]    Caregiver present 
[]    Bed alarm activated COMMUNICATION/COLLABORATION:  
The patients plan of care was discussed with: Certified Occupational Therapy Assistant, Registered Nurse and patient [facility]. Time Calculation: 24 mins

## 2018-01-11 NOTE — PROGRESS NOTES
Bedside and Verbal shift change report given to [facility] (oncoming nurse) by Victor Hugo Andrews (offgoing nurse). Report included the following information SBAR, Kardex, Intake/Output, MAR, Recent Results and Cardiac Rhythm nsr. 1005 order to transfer pt to [facility], start rocephin 1g q24 
 
1011 Dr Supriya Pitts notified of pt loose BM, no new orders at this time. [address] Per Dr Supriya Pitts, no samples to be sent, notify MD if another loose stool occurs. [phone] pt had another liquid BM, Dr Supriya Pitts notified. Call MD if pt has another loose BM today, which would total 3.  
 
1539 order to start cozaar 50 mg daily start 1600 
 
1727 Pt had another large, watery BM. 18 Dr Brent Hays notified of BM, no orders at this time. 56 Dr Supriya Pitts notified of loose BM, order for cdiff culture, enteric precautions. Left message with infection control. Pt has room assigned 407, Reggie Mora notified by phone. TRANSFER - OUT REPORT: 
 
Verbal report given to Gwen(name) shane Crowell  being transferred to 4th floor room 407(unit) for routine progression of care Report consisted of patients Situation, Background, Assessment and  
Recommendations(SBAR). Information from the following report(s) SBAR, Kardex, ED Summary, Procedure Summary, Intake/Output, MAR, Recent Results and Cardiac Rhythm nsr was reviewed with the receiving nurse. Lines:  
Peripheral IV 01/08/18 Left Antecubital (Active) Site Assessment Clean, dry, & intact 1/11/2018  4:35 PM  
Phlebitis Assessment 0 1/11/2018  4:35 PM  
Infiltration Assessment 0 1/11/2018  4:35 PM  
Dressing Status Clean, dry, & intact 1/11/2018  4:35 PM  
Dressing Type Transparent 1/11/2018  4:35 PM  
Hub Color/Line Status Green 1/11/2018  4:35 PM  
Action Taken Open ports on tubing capped 1/11/2018  4:35 PM  
Alcohol Cap Used Yes 1/11/2018  4:35 PM  
  
 
Opportunity for questions and clarification was provided.    
 
Patient transported with: 
 Tech  
 
RN aware of enteric contact precautions, need for stool sample. RN aware that pt needs to be fitted for Jobst stockings, will alert day shift to contact [facility] for them.

## 2018-01-11 NOTE — PROGRESS NOTES
Cardiology   Progress Note 2018 9:57 AM  
 
Admit Date:           2018 Admit Diagnosis:  Elevated troponin Accelerated hypertension Altered mental status UTI (urinary tract infection) Encephalopathy UTI (urinary tract infection) :          1935 MRN:          [id number] ASSESSMENT/RECOMMENDATION:  
1. Altered mental status/slurred speech: resolved 2. History of hypertension with orthostatic issues: ( I do not see any documented today) - Jobst stockings 
- consider stopping lasix if orthostasis persists - No ACE-I/ARB 
- cont labetalol - PT 3. heart failure with preserved ejection fraction (chronic diastolic heart failure): echo shows 50%. - euvolemic 4. Troponin elevation minimal elevation . 0.26, not ACS related 5. History of DVT- 
- on anticoagulation on eliquis 2.5 mg BID, michael dosed 6. CKD-stage II: creatinine stable CARDIOLOGY ATTENDING Patient personally seen and examined. All the elements of history and examination were personally performed. Assessment and plan was discussed and agree as written above Mr. Franklin Estrada is feeling better and BP seems more stable. Would continue labetalol. I will sign off, but please call with any questions. My office will arrange FU appt with him. Nader Melara MD, [facility] Echo 17 ELVEF 50 %. LA moderately to severely dilated. Mild MR. Last 3 Recorded Weights in this Encounter 18 1017 01/10/18 0538 18 5722 Weight: 170 lb (77.1 kg) 163 lb 12.8 oz (74.3 kg) 165 lb 9.6 oz (75.1 kg)  1901 -  0700 In: 3206.7 [P.O.:360; I.V.:2846.7] Out: 1000 [Urine:1000] SUBJECTIVE Dairl Balder denies chest pain or SOB. Current Facility-Administered Medications Medication Dose Route Frequency  potassium, sodium phosphates (NEUTRA-PHOS) packet 1 Packet  1 Packet Oral QID  furosemide (LASIX) tablet 20 mg  20 mg Oral DAILY  apixaban (ELIQUIS) tablet 2.5 mg  2.5 mg Oral BID  labetalol (NORMODYNE) tablet 300 mg  300 mg Oral BID  simvastatin (ZOCOR) tablet 40 mg  40 mg Oral QHS  sertraline (ZOLOFT) tablet 50 mg  50 mg Oral DAILY  sodium chloride (NS) flush 5-10 mL  5-10 mL IntraVENous Q8H  
 sodium chloride (NS) flush 5-10 mL  5-10 mL IntraVENous PRN  
 glucose chewable tablet 16 g  4 Tab Oral PRN  
 dextrose (D50W) injection syrg 12.5-25 g  12.5-25 g IntraVENous PRN  
 glucagon (GLUCAGEN) injection 1 mg  1 mg IntraMUSCular PRN  
 insulin lispro (HUMALOG) injection   SubCUTAneous AC&HS  cefTRIAXone (ROCEPHIN) 1 g in 0.9% sodium chloride (MBP/ADV) 50 mL  1 g IntraVENous Q24H  carbidopa-levodopa (SINEMET)  mg per tablet 1 Tab  1 Tab Oral TID OBJECTIVE Intake/Output Summary (Last 24 hours) at 01/11/18 2700 Last data filed at 01/11/18 3992 Gross per 24 hour Intake              510 ml Output              600 ml Net              -90 ml Review of Systems - History obtained from the patient AS PER  HPI Telemetry NSR 
 
PHYSICAL EXAM  
  
 
Visit Vitals  /68  Pulse 76  Temp 98.3 °F (36.8 °C)  Resp 18  Ht 5' 10\" (1.778 m)  Wt 165 lb 9.6 oz (75.1 kg)  SpO2 97%  BMI 23.76 kg/m2 Gen: Well-developed, in no acute distress  alert and oriented x 2 (place and self) HEENT:  Pink conjunctivae, Hearing grossly normal.No scleral icterus or conjunctival, moist mucous membranes Neck: Supple,No JVD,   No cervical lymphadenopathy Resp: No accessory muscle use, Clear breath sounds, No rales or rhonchi 
Card: Regular Rate,Rythm,  No murmurs, rubs or gallop. No thrills. GI:          soft, non-tender MSK: No cyanosis Skin:      D/I. Neuro:  Cranial nerves are grossly intact, moving all four extremities, no focal deficit, follows commands appropriately.   
Psych:  Fair insight, oriented to person, place and time, alert, Nml Affect. LE:         No edema, SCDs on. DATA REVIEW Laboratory and Imaging have been reviewed by me and are notable for Recent Labs 01/09/18 
 1012  01/09/18 
 0435  01/08/18 
 2254 TROIQ  0.26*  0.18*  0.20* Recent Labs  
   01/11/18 
 0112  01/10/18 
 0114  01/09/18 
 1012  01/09/18 
 0435 NA  138  140   --   149*  
K  4.1  4.3   --   3.4*  
CO2  29  28   --   28  
BUN  24*  28*   --   28* CREA  1.14  1.18   --   1.61* GLU  98  104*   --   89 PHOS  3.7  2.8  3.0   --   
MG  1.9  1.8  1.8   --   
WBC  4.2  4.6   --   5.0 HGB  10.5*  11.0*   --   11.2* HCT  31.5*  33.2*   --   34.1*  
PLT  155  163   --   171 Peggyann Renetta, NP

## 2018-01-11 NOTE — PROGRESS NOTES
[facility]: MED RECONCILIATION Update: Spoke with patient's daughter Ronak Mast). She confirms current list as accurate. Prior to Admission Medications:  
 
Medication Documentation Review Audit Reviewed by Dolores Rayo RN (Registered Nurse) on 01/08/18 at 5942 Medication Sig Documenting Provider Last Dose Status Taking? apixaban (ELIQUIS) 2.5 mg tablet TAKE ONE TABLET BY MOUTH TWICE DAILY Natalia Rosas MD 1/8/2018 Unknown time Active Yes  
 carbidopa-levodopa (SINEMET)  mg per tablet TAKE ONE TABLET BY MOUTH THREE TIMES DAILY Natalia Rosas MD 1/8/2018 Unknown time Active Yes  
 furosemide (LASIX) 40 mg tablet Take 1 Tab by mouth daily. Natalia Rosas MD 1/8/2018 Unknown time Active Yes  
 labetalol (NORMODYNE) 300 mg tablet TAKE ONE TABLET BY MOUTH TWICE DAILY Natalia Rosas MD 1/8/2018 Unknown time Active Yes  
 losartan (COZAAR) 50 mg tablet Take 1 Tab by mouth daily. Natalia Rosas MD 1/8/2018 Unknown time Active Yes  
 sertraline (ZOLOFT) 50 mg tablet TAKE ONE TABLET BY MOUTH ONCE DAILY FOR ANXIETY Natalia Rosas MD 1/7/2018 Unknown time Active Yes  
 simvastatin (ZOCOR) 40 mg tablet Take 1 Tab by mouth nightly. Natalia Rosas MD 1/7/2018 Unknown time Active Yes  
 therapeutic multivitamin [facility]) tablet Take 1 Tab by mouth daily. Historical Provider 1/8/2018 Unknown time Active Yes Shawn Kwon, PHARMD   Contact: [phone]

## 2018-01-12 PROBLEM — D72.819 LEUKOPENIA: Status: ACTIVE | Noted: 2018-01-12

## 2018-01-12 PROBLEM — D64.9 NORMOCYTIC ANEMIA: Status: ACTIVE | Noted: 2018-01-12

## 2018-01-12 PROBLEM — G93.41 ACUTE METABOLIC ENCEPHALOPATHY: Status: ACTIVE | Noted: 2018-01-12

## 2018-01-12 PROBLEM — R77.8 ELEVATED TROPONIN: Status: RESOLVED | Noted: 2018-01-09 | Resolved: 2018-01-12

## 2018-01-12 PROBLEM — R41.82 ALTERED MENTAL STATUS: Status: RESOLVED | Noted: 2018-01-09 | Resolved: 2018-01-12

## 2018-01-12 PROBLEM — D50.9 IRON DEFICIENCY ANEMIA: Status: ACTIVE | Noted: 2018-01-12

## 2018-01-12 PROBLEM — G93.40 ENCEPHALOPATHY: Status: RESOLVED | Noted: 2018-01-10 | Resolved: 2018-01-12

## 2018-01-12 LAB
ANION GAP SERPL CALC-SCNC: 7 MMOL/L (ref 5–15)
BUN SERPL-MCNC: 25 MG/DL (ref 6–20)
BUN/CREAT SERPL: 24 (ref 12–20)
C DIFF TOX GENS STL QL NAA+PROBE: NEGATIVE
CALCIUM SERPL-MCNC: 8.5 MG/DL (ref 8.5–10.1)
CHLORIDE SERPL-SCNC: 104 MMOL/L (ref 97–108)
CO2 SERPL-SCNC: 28 MMOL/L (ref 21–32)
CREAT SERPL-MCNC: 1.05 MG/DL (ref 0.7–1.3)
ERYTHROCYTE [DISTWIDTH] IN BLOOD BY AUTOMATED COUNT: 14.4 % (ref 11.5–14.5)
GLUCOSE BLD STRIP.AUTO-MCNC: 103 MG/DL (ref 65–100)
GLUCOSE BLD STRIP.AUTO-MCNC: 124 MG/DL (ref 65–100)
GLUCOSE BLD STRIP.AUTO-MCNC: 129 MG/DL (ref 65–100)
GLUCOSE BLD STRIP.AUTO-MCNC: 81 MG/DL (ref 65–100)
GLUCOSE SERPL-MCNC: 101 MG/DL (ref 65–100)
HCT VFR BLD AUTO: 32.1 % (ref 36.6–50.3)
HGB BLD-MCNC: 10.7 G/DL (ref 12.1–17)
MAGNESIUM SERPL-MCNC: 1.9 MG/DL (ref 1.6–2.4)
MCH RBC QN AUTO: 29.8 PG (ref 26–34)
MCHC RBC AUTO-ENTMCNC: 33.3 G/DL (ref 30–36.5)
MCV RBC AUTO: 89.4 FL (ref 80–99)
PHOSPHATE SERPL-MCNC: 3.9 MG/DL (ref 2.6–4.7)
PLATELET # BLD AUTO: 151 K/UL (ref 150–400)
POTASSIUM SERPL-SCNC: 4 MMOL/L (ref 3.5–5.1)
RBC # BLD AUTO: 3.59 M/UL (ref 4.1–5.7)
SERVICE CMNT-IMP: ABNORMAL
SERVICE CMNT-IMP: NORMAL
SODIUM SERPL-SCNC: 139 MMOL/L (ref 136–145)
WBC # BLD AUTO: 3.9 K/UL (ref 4.1–11.1)

## 2018-01-12 PROCEDURE — 97530 THERAPEUTIC ACTIVITIES: CPT

## 2018-01-12 PROCEDURE — 65660000000 HC RM CCU STEPDOWN

## 2018-01-12 PROCEDURE — 74011000258 HC RX REV CODE- 258: Performed by: FAMILY MEDICINE

## 2018-01-12 PROCEDURE — 83735 ASSAY OF MAGNESIUM: CPT | Performed by: FAMILY MEDICINE

## 2018-01-12 PROCEDURE — 74011250636 HC RX REV CODE- 250/636: Performed by: FAMILY MEDICINE

## 2018-01-12 PROCEDURE — 87493 C DIFF AMPLIFIED PROBE: CPT

## 2018-01-12 PROCEDURE — 36415 COLL VENOUS BLD VENIPUNCTURE: CPT | Performed by: FAMILY MEDICINE

## 2018-01-12 PROCEDURE — 97535 SELF CARE MNGMENT TRAINING: CPT

## 2018-01-12 PROCEDURE — 84100 ASSAY OF PHOSPHORUS: CPT | Performed by: FAMILY MEDICINE

## 2018-01-12 PROCEDURE — 97116 GAIT TRAINING THERAPY: CPT

## 2018-01-12 PROCEDURE — 74011250637 HC RX REV CODE- 250/637: Performed by: NURSE PRACTITIONER

## 2018-01-12 PROCEDURE — 80048 BASIC METABOLIC PNL TOTAL CA: CPT | Performed by: FAMILY MEDICINE

## 2018-01-12 PROCEDURE — 74011250637 HC RX REV CODE- 250/637: Performed by: FAMILY MEDICINE

## 2018-01-12 PROCEDURE — 82962 GLUCOSE BLOOD TEST: CPT

## 2018-01-12 PROCEDURE — 74011250637 HC RX REV CODE- 250/637: Performed by: HOSPITALIST

## 2018-01-12 PROCEDURE — 85027 COMPLETE CBC AUTOMATED: CPT | Performed by: FAMILY MEDICINE

## 2018-01-12 RX ORDER — MAGNESIUM SULFATE 1 G/100ML
1 INJECTION INTRAVENOUS ONCE
Status: COMPLETED | OUTPATIENT
Start: 2018-01-12 | End: 2018-01-12

## 2018-01-12 RX ORDER — LOSARTAN POTASSIUM 50 MG/1
50 TABLET ORAL DAILY
Status: DISCONTINUED | OUTPATIENT
Start: 2018-01-12 | End: 2018-01-13 | Stop reason: HOSPADM

## 2018-01-12 RX ADMIN — APIXABAN 2.5 MG: 2.5 TABLET, FILM COATED ORAL at 09:01

## 2018-01-12 RX ADMIN — LABETALOL HCL 300 MG: 200 TABLET, FILM COATED ORAL at 09:01

## 2018-01-12 RX ADMIN — MAGNESIUM SULFATE HEPTAHYDRATE 1 G: 1 INJECTION, SOLUTION INTRAVENOUS at 05:58

## 2018-01-12 RX ADMIN — POTASSIUM & SODIUM PHOSPHATES POWDER PACK 280-160-250 MG 1 PACKET: 280-160-250 PACK at 21:06

## 2018-01-12 RX ADMIN — CARBIDOPA AND LEVODOPA 1 TABLET: 25; 250 TABLET ORAL at 18:48

## 2018-01-12 RX ADMIN — SERTRALINE HYDROCHLORIDE 50 MG: 50 TABLET ORAL at 09:01

## 2018-01-12 RX ADMIN — Medication 10 ML: at 02:53

## 2018-01-12 RX ADMIN — LABETALOL HCL 300 MG: 200 TABLET, FILM COATED ORAL at 18:48

## 2018-01-12 RX ADMIN — FUROSEMIDE 20 MG: 20 TABLET ORAL at 09:01

## 2018-01-12 RX ADMIN — SIMVASTATIN 40 MG: 20 TABLET, FILM COATED ORAL at 21:05

## 2018-01-12 RX ADMIN — Medication 10 ML: at 21:05

## 2018-01-12 RX ADMIN — CARBIDOPA AND LEVODOPA 1 TABLET: 25; 250 TABLET ORAL at 21:05

## 2018-01-12 RX ADMIN — CEFTRIAXONE SODIUM 1 G: 1 INJECTION, POWDER, FOR SOLUTION INTRAMUSCULAR; INTRAVENOUS at 21:05

## 2018-01-12 RX ADMIN — POTASSIUM & SODIUM PHOSPHATES POWDER PACK 280-160-250 MG 1 PACKET: 280-160-250 PACK at 13:19

## 2018-01-12 RX ADMIN — Medication 10 ML: at 13:13

## 2018-01-12 RX ADMIN — POTASSIUM & SODIUM PHOSPHATES POWDER PACK 280-160-250 MG 1 PACKET: 280-160-250 PACK at 18:48

## 2018-01-12 RX ADMIN — POTASSIUM & SODIUM PHOSPHATES POWDER PACK 280-160-250 MG 1 PACKET: 280-160-250 PACK at 09:02

## 2018-01-12 RX ADMIN — CARBIDOPA AND LEVODOPA 1 TABLET: 25; 250 TABLET ORAL at 09:01

## 2018-01-12 RX ADMIN — LOSARTAN POTASSIUM 50 MG: 50 TABLET ORAL at 09:01

## 2018-01-12 RX ADMIN — APIXABAN 2.5 MG: 2.5 TABLET, FILM COATED ORAL at 18:48

## 2018-01-12 NOTE — PROGRESS NOTES
9/73/31731:00 PM Planning for discharge on 1/13 to [address] [place], [place] [address]Yosvany Slater at 3:30PM. Pt's daughter, TIFFANY([phone]) will transport pt. Nursing please call report to [phone], please ask for [facility] in admissions. Please fax pt's avs, mars, scripts, discharge summary, and most recent progress notes to [address] [place], [place] [address]Yosvany Slater at [phone]. Thanks. 1/12/2018  3:35 PM Spoke with pt's daughter, Rossana Chung and notified of pt's acceptance to [address] [place] [address]. Way on 1/13. Pt's daughter confirmed she can transport pt to [address] [place] [address] at 3:30PM on 1/13. 
 
 
1/12/2018 2:42 PM Confirmed with Guy Pozo at [address] [place] [address] [place] they can accept pt on 1/13. CM called and lvm with pt's daughter, Rossana Chung at [phone]. CM will follow up.  
 
1/12/2018 9:33 AM EMR reviewed, referral pending with Anton of [facility]. Pt's inpatient order was placed on 1/10, pt will be able to discharge to SNF on 1/13. CM called and spoke with Guy Pozo in admissions at [address] [place], [place] [address]Yosvany Slater, referral pending. CM will follow up. HANNA Mueller

## 2018-01-12 NOTE — PROGRESS NOTES
Bedside and Verbal shift change report given to Harry Ott (oncoming nurse) by JOSE DANIEL Bustillos (offgoing nurse). Report given with SBAR, Kardex, OR Summary, Intake/Output, MAR and Recent Results.

## 2018-01-12 NOTE — PROGRESS NOTES
[address] [facility] RESIDENCY PROGRAM 
PROGRESS NOTE  
 
1/11/2018 PCP: Delmy Alexandre MD  
 
Assessment/Plan:  
 
Arline Kenney a 80 y. o. male with PMH signficiant for DVT, who is admitted for AMS found to have UTI. 
  
24 Hour Events: /92 given IV Hydralazine 10mg x1 
  
Encephalopathy in pt with h/o TIA - Much Improved. CT Head neg for acute process but signficant for microvascular infarcts of undetermined age. UA significant for WBCs, moderate LE, 4+ bacteria. TSH 3.15. Orthostatics in ED positive for inadequate response of BP to positional changes. Echo EF 50% with mildly increased wall thickness. Carotid dopplers <50% ICA stenosis BL.  
- Neuro consulted, canceled MRI/MRA - Fall precautions, Ambulate with assistance 
- PT/OT recommend Rehab 
   
Complicated UTI with hx of BPH - UA with moderate LE, >100 WBC, 4+ bacteria. UCx not collected in ED. 
- IV CTX 1g daily (day 3/5) 
   
Accelerated Hypertension with orthostatic hypotension - BP overnight 173/92. Given Hydralazine 10mg x1.  
- Labetalol 300mg BID 
   
H/o DVT - On Eliquis 2.5mg BID. 
- Eliquis 4.4OC BID 
   
Systolic Heart Failure with reduced EF - Echo EF 50% with mildly increased wall thickness. POA CXR celar. ProBNP elevated 3407 (last admission 2968), may be elevated at [facility]. 
- Cards consulted - increased dose of Labetalol and discontinued Losartan 
   
At risk for TEVIN Stage 2 CKD - POA GFR 37 (BL 60), Cr 2.08 (BL 1.1-1.4). Today Cr back to BL 1.18. 
- Lasix 20mg daily 
- Daily BMP 
   
Elevated Troponin - 0.2, 0.18 and 0.26 respectively. - Cards consulted, increased Labetalol, discontinued Losartan 
   
Diabetes Mellitus T2 -  A1c 6.4 
- Diet controlled, hypoglycemia protocols ordered 
- SSI high sensitivity with ACHS BG 
- Diabetic diet 
   
HLD - , TG 51, , HDL 86 
- Simvastatin 40mg daily 
   
Prolonged Qtc - Qtc of 495, Previous ECG on 4/7/17 showed qtc of 483. - Avoid Qtc prolonging agents - Cardiac monitoring 
   
Parkinson Disease -Progression of disease may be contributing to instability.  
- Continue Sinemet 25-250mg tablet TID 
   
Depression - stable. - Zoloft 50mg qhs 
  
Electrolyte Abnormalities - Hypomagnesemia - Mg 1.9, IV Mag 1g x1 
   
FEN/GI - Diabetic diet with low sodium. Activity - Up with assistance DVT prophylaxis - Eliquis GI prophylaxis -  Not indicated Disposition - PT/OT recommend Rehab, CM working on dispo planning CODE STATUS:  FULL 
  
I appreciate the opportunity to participate in the care of this patient, Bill Alfonso DO Family Medicine Resident 
  
Pt will be discussed with Dr. Neto Huang MD ([address] [place] [address] attending physician) Subjective: Pt was seen and examined at bedside. Patient states he is doing well. Denies chest pain, SOB, abdominal pain. States he feels \"good. \" 
Acute Events Overnight: None Diet: Tolerating diet Activity Level: Working with PT/OT Objective:  
Physical examination Visit Vitals  /75 (BP 1 Location: Right arm, BP Patient Position: At rest)  Pulse 70  Temp 97.7 °F (36.5 °C)  Resp 18  Ht 5' 10\" (1.778 m)  Wt 165 lb 9.6 oz (75.1 kg)  SpO2 96%  BMI 23.76 kg/m2 Temp (24hrs), Av.2 °F (36.8 °C), Min:97.4 °F (36.3 °C), Max:99.1 °F (37.3 °C) O2 Device: Room air Intake/Output Summary (Last 24 hours) at 18 Last data filed at 18 1208 Gross per 24 hour Intake              750 ml Output              600 ml Net              150 ml Last shift: 
    
Last 3 shifts: 
  01/10 07 -  1900 In: 750 [P.O.:600; I.V.:150] Out: 900 [Urine:900] General:   Alert, cooperative, thin elderly man, no acute distress Lungs:   Clear to auscultation bilaterally Heart:   R carotid bruit +, Regular rhythm, systolic murmur Abdomen:    Soft, non-tender No masses or organomegaly Extremities:  No edema or DVT signs Pulses:  Symmetric all extremities Skin:  Warm and dry  No rashes or lesions Neurologic:  Oriented to person and place, but not time No focal deficits Data Review:  
Reviewed Hg 10.5 Telemetry NSR, 79 Recent Labs  
   01/11/18 
 0112  01/10/18 
 0114 01/09/18 
 0435 WBC  4.2  4.6  5.0 HGB  10.5*  11.0*  11.2* HCT  31.5*  33.2*  34.1*  
PLT  155  163  171 Recent Labs  
   01/11/18 
 0112  01/10/18 
 0114  01/09/18 
 1012  01/09/18 
 7208  01/09/18 
 0435  01/08/18 
 2254 NA  138  140   --    --   149*  143  
K  4.1  4.3   --    --   3.4*  4.1 CL  106  105   --    --   109*  102 CO2  29  28   --    --   28  30 GLU  98  104*   --    --   89  54*  
BUN  24*  28*   --    --   28*  32* CREA  1.14  1.18   --    --   1.61*  2.08* CA  8.8  8.8   --    --   7.6*  9.5 MG  1.9  1.8  1.8   --    --    --   
PHOS  3.7  2.8  3.0   --    --    --   
ALB   --    --    --    --    --   4.0 TBILI   --    --    --    --    --   0.5 SGOT   --    --    --    --    --   25 ALT   --    --    --    --    --   10* INR   --    --    --   1.1   --    -- Medications reviewed Current Facility-Administered Medications Medication Dose Route Frequency  cefTRIAXone (ROCEPHIN) 1 g in 0.9% sodium chloride (MBP/ADV) 50 mL  1 g IntraVENous Q24H  
 losartan (COZAAR) tablet 50 mg  50 mg Oral DAILY  potassium, sodium phosphates (NEUTRA-PHOS) packet 1 Packet  1 Packet Oral QID  furosemide (LASIX) tablet 20 mg  20 mg Oral DAILY  apixaban (ELIQUIS) tablet 2.5 mg  2.5 mg Oral BID  labetalol (NORMODYNE) tablet 300 mg  300 mg Oral BID  simvastatin (ZOCOR) tablet 40 mg  40 mg Oral QHS  sertraline (ZOLOFT) tablet 50 mg  50 mg Oral DAILY  sodium chloride (NS) flush 5-10 mL  5-10 mL IntraVENous Q8H  
 sodium chloride (NS) flush 5-10 mL  5-10 mL IntraVENous PRN  
 glucose chewable tablet 16 g  4 Tab Oral PRN  
 dextrose (D50W) injection syrg 12.5-25 g  12.5-25 g IntraVENous PRN  
 glucagon (GLUCAGEN) injection 1 mg  1 mg IntraMUSCular PRN  
 insulin lispro (HUMALOG) injection   SubCUTAneous AC&HS  carbidopa-levodopa (SINEMET)  mg per tablet 1 Tab  1 Tab Oral TID Signed: 
 Ashkan Guzman DO Resident, Family Medicine, PGY1 Attending note: Attending note to follow. ..

## 2018-01-12 NOTE — PROGRESS NOTES
Spiritual Care Partner Volunteer visited patient in 56 on 1.11.18. Documented by: 
 
Camille Cullen M.Div, M.S, [facility] Spiritual Care available at [phone])

## 2018-01-12 NOTE — PROGRESS NOTES
[address] [facility] PROGRAM 
PROGRESS NOTE  
 
1/12/2018 PCP: Kati Bethea MD  
 
Assessment/Plan:  
Bimal Minors a 80 y. o. male with PMH signficiant for DVT, who is admitted for AMS found to have UTI. Admitted 1/9/18.  
   
24 Hour Events: NAEO 
   
Encephalopathy likely 2/2 Complicated UTI - Resolved. CT Head neg for acute process but signficant for microvascular infarcts of undetermined age. UA significant for WBCs, moderate LE, 4+ bacteria. TSH 3.15. Orthostatics in ED positive for inadequate response of BP to positional changes. Echo EF 50% with mildly increased wall thickness. Carotid dopplers <50% ICA stenosis BL. UA showed moderate LE, >100WBC, 4+ bacteria. Ucx not collected in ED. 
- Neuro consulted, canceled MRI/MRA - Fall precautions, Ambulate with assistance - IV CTX 1g daily (day 4/5) - PT recommended HH, OT reccomends rehab, daughter and patient desire patient to be discharged to SNF, likely discharge 1/13 as patient requires 3 nights inpatient status Accelerated Hypertension with orthostatic hypotension - BP overnight 173/92. Given Hydralazine 10mg x1.  
- Labetalol 300mg BID 
- Decreased Lasix from 40mg daily to 20mg daily - Per Cardio do not recommend ARB on discharge, Jobst stockings, and consider stopping Lasix if orthostatics persists 
   
History of DVT  
- Continue home Eliquis 3.2PJ BID 
   
Systolic Heart Failure with reduced EF - Echo EF 50% with mildly increased wall thickness. POA CXR celar. ProBNP elevated 3407 (last admission 2968), may be elevated at [facility]. 
- Cards consulted - increased dose of Labetalol, discontinued Losartan - Decreased Lasix from home 40mg daily to 20mg, consider discontinuing if orthostatics persists 
   
At risk for TEVIN Stage 2 CKD - Resolved. POA GFR 37 (BL 60), Cr 2.08 (BL 1.1-1.4).  Today Cr back to BL 1.05. 
- Decreased Lasix 40mg daily to 20mg 
- Daily BMP 
   
ACS Rule out in patient with hypertroponinemia - 0.2, 0.18 and 0.26 respectively. - Cards consulted, increased Labetalol, discontinued Losartan 
   
Diabetes Mellitus T2 -  A1c 6.4 
- Diet controlled, hypoglycemia protocols ordered 
- SSI high sensitivity with ACHS BG 
- Diabetic diet 
   
HLD - , TG 51, , HDL 86 
- Simvastatin 40mg daily 
   
Prolonged Qtc - Qtc of 495, Previous ECG on 17 showed qtc of 483. - Avoid Qtc prolonging agents - Cardiac monitoring 
   
Parkinson Disease -Progression of disease may be contributing to instability.  
- Continue Sinemet 25-250mg tablet TID 
   
Depression - stable. - Zoloft 50mg qhs 
   
Electrolyte Abnormalities - Hypomagnesemia - Mg 1.9, IV Mag 1g x1 
   
FEN/GI - Diabetic diet with low sodium. Activity - Up with assistance DVT prophylaxis - Eliquis GI prophylaxis -  Not indicated Disposition - PT/OT recommend Rehab, CM working on dispo planning CODE STATUS:  FULL 
  
I appreciate the opportunity to participate in the care of this patient, Sepedy Us DO Family Medicine Resident 
  
Pt will be discussed with Dr. Evan Francisco MD ([address] [place] [address] attending physician) Subjective: Pt was seen and examined at bedside. Patient had no complaints. States \"I am doing good. \" Denies chest pain, shortness of breath, abdominal pain. Acute Events Overnight: None Diet: Tolerating  Diabetic diet with low sodium Activity Level: Working with PT/OT Objective:  
Physical examination Visit Vitals  /83 (BP 1 Location: Left arm, BP Patient Position: Sitting)  Pulse 72  Temp 98 °F (36.7 °C)  Resp 16  
 Ht 5' 10\" (1.778 m)  Wt 165 lb 8 oz (75.1 kg)  SpO2 99%  BMI 23.75 kg/m2 Temp (24hrs), Av.9 °F (36.6 °C), Min:97.6 °F (36.4 °C), Max:98.2 °F (36.8 °C) O2 Device: Room air No intake or output data in the 24 hours ending 18 1723 Last shift: 
    
Last 3 shifts: 
  01/10 1901 -  0700 In: 750 [P.O.:600; I.V.:150] Out: 600 [Urine:600] General:   Alert, cooperative, thin elderly man, no acute distress Lungs:   Clear to auscultation bilaterally Heart:   R carotid bruit +, Regular rhythm, systolic murmur Abdomen:    Soft, non-tender No masses or organomegaly Extremities:  No edema or DVT signs Pulses:  Symmetric all extremities Skin:  Warm and dry No rashes or lesions Neurologic:  Oriented to person, place and time No focal deficits Data Review:  
Reviewed WBC 3.9, Hg 10.7, Mg 1.9 Telemetry NSR, 79 Recent Labs  
   01/12/18 
 0257  01/11/18 
 0112  01/10/18 
 0114 WBC  3.9*  4.2  4.6 HGB  10.7*  10.5*  11.0*  
HCT  32.1*  31.5*  33.2*  
PLT  151  155  163 Recent Labs  
   01/12/18 
 0257  01/11/18 
 0112  01/10/18 
 0114 NA  139  138  140  
K  4.0  4.1  4.3 CL  104  106  105 CO2  28  29  28 GLU  101*  98  104* BUN  25*  24*  28* CREA  1.05  1.14  1.18  
CA  8.5  8.8  8.8 MG  1.9  1.9  1.8 PHOS  3.9  3.7  2.8 Medications reviewed Current Facility-Administered Medications Medication Dose Route Frequency  losartan (COZAAR) tablet 50 mg  50 mg Oral DAILY  cefTRIAXone (ROCEPHIN) 1 g in 0.9% sodium chloride (MBP/ADV) 50 mL  1 g IntraVENous Q24H  potassium, sodium phosphates (NEUTRA-PHOS) packet 1 Packet  1 Packet Oral QID  furosemide (LASIX) tablet 20 mg  20 mg Oral DAILY  apixaban (ELIQUIS) tablet 2.5 mg  2.5 mg Oral BID  labetalol (NORMODYNE) tablet 300 mg  300 mg Oral BID  simvastatin (ZOCOR) tablet 40 mg  40 mg Oral QHS  sertraline (ZOLOFT) tablet 50 mg  50 mg Oral DAILY  sodium chloride (NS) flush 5-10 mL  5-10 mL IntraVENous Q8H  
 sodium chloride (NS) flush 5-10 mL  5-10 mL IntraVENous PRN  
 glucose chewable tablet 16 g  4 Tab Oral PRN  
 dextrose (D50W) injection syrg 12.5-25 g  12.5-25 g IntraVENous PRN  
 glucagon (GLUCAGEN) injection 1 mg  1 mg IntraMUSCular PRN  
 insulin lispro (HUMALOG) injection   SubCUTAneous AC&HS  carbidopa-levodopa (SINEMET)  mg per tablet 1 Tab  1 Tab Oral TID Signed: 
 Ephraim Olmedo DO Resident, Family Medicine, PGY1 Attending note: Attending note to follow. ..

## 2018-01-12 NOTE — PROGRESS NOTES
Problem: Mobility Impaired (Adult and Pediatric) Goal: *Acute Goals and Plan of Care (Insert Text) Physical Therapy Goals Initiated 1/9/2018 1. Patient will move from supine to sit and sit to supine  in bed with minimal assistance/contact guard assist within 7 day(s). 2.  Patient will transfer from bed to chair and chair to bed with moderate assistance  using the least restrictive device within 7 day(s). 3.  Patient will perform sit to stand with moderate assistance  within 7 day(s). 4.  Patient will ambulate with moderate assistance  for 50 feet with the least restrictive device within 7 day(s). 5.  Patient will ascend/descend 2 stairs with 1 handrail(s) with minimal assistance/contact guard assist within 7 day(s). physical Therapy TREATMENT Patient: Hanna Dixon (2 y.o. male) Date: 1/12/2018 Diagnosis: Elevated troponin Accelerated hypertension Altered mental status UTI (urinary tract infection) Encephalopathy UTI (urinary tract infection) Orthostatic hypotension Precautions: Fall ASSESSMENT: 
TP received in bed, agreeable to participate with physical therapy. Performs functional transfers with CGA and additional time to complete task. BP drop with initial supine>sitting recovered within one min with BLE there ex, please see flowsheet. Gait training using RW x 20' with CGA/min A for steadying verbal cues for upright posture. demonstrates shuffling, parkinsonian gait pattern with increased \"freezing\" with turning. Returned to chair, [facility] then bertha to chair. Total A for bowel hygiene. Woudl benefit form rehab to improve functional mobility as he was ambulating with a straight cane and independent in ADL's PTA. Progression toward goals: 
[]    Improving appropriately and progressing toward goals [x]    Improving slowly and progressing toward goals 
[]    Not making progress toward goals and plan of care will be adjusted PLAN: 
Patient continues to benefit from skilled intervention to address the above impairments. Continue treatment per established plan of care. Discharge Recommendations:  Rehab Further Equipment Recommendations for Discharge:  none SUBJECTIVE:  
Patient stated I get a little dizzy sometime. Uziel Carrero OBJECTIVE DATA SUMMARY:  
Critical Behavior: 
Neurologic State: Alert Orientation Level: Oriented X4 Cognition: Decreased attention/concentration Safety/Judgement: Lack of insight into deficits Functional Mobility Training: 
Bed Mobility: 
  
Supine to Sit: Contact guard assistance; Additional time Scooting: Additional time;Contact guard assistance Transfers: 
Sit to Stand: Minimum assistance; Additional time Stand to Sit: Minimum assistance; Additional time Balance: 
Sitting: Intact Standing: Impaired; With support Standing - Static: Fair Standing - Dynamic : Fair Ambulation/Gait Training: 
Distance (ft): 20 Feet (ft) Assistive Device: Walker, rolling;Gait belt Ambulation - Level of Assistance: Minimal assistance Gait Abnormalities: Decreased step clearance; Path deviations Base of Support: Narrowed Speed/[place]: Slow;Shuffled Stairs: 
  
  
   
Neuro Re-Education: 
 
Therapeutic Exercises:  
 
Pain: 
Pain Scale 1: Numeric (0 - 10) Pain Intensity 1: 0 Activity Tolerance:  
good Please refer to the flowsheet for vital signs taken during this treatment. After treatment:  
[x]    Patient left in no apparent distress sitting up in chair 
[]    Patient left in no apparent distress in bed 
[x]    Call bell left within reach [x]    Nursing notified 
[]    Caregiver present [x]    Chair alarm activated COMMUNICATION/COLLABORATION:  
The patients plan of care was discussed with: Registered Nurse Olita Cockayne ,PTA Time Calculation: 40 mins

## 2018-01-12 NOTE — PROGRESS NOTES
1/12/2018 9:33 AM EMR reviewed, referral pending with [facility]. Pt's inpatient order was placed on 1/10, pt will be able to discharge to SNF on 1/13. CM called and spoke with Carter Santillan in admissions at [facility], referral pending. CM will follow up. HANNA Dent

## 2018-01-12 NOTE — PROGRESS NOTES
Problem: Self Care Deficits Care Plan (Adult) Goal: *Acute Goals and Plan of Care (Insert Text) Occupational Therapy Goals Initiated 1/10/2018 1. Patient will perform self-feeding with modified independence within 7 day(s). 2.  Patient will perform upper body dressing with modified independence within 7 day(s). 3.  Patient will perform lower body dressing with minimal assistance/contact guard assist within 7 day(s). 4.  Patient will perform toilet transfers with minimal assistance/contact guard assist within 7 day(s). 5.  Patient will perform all aspects of toileting with minimal assistance/contact guard assist within 7 day(s). 6.  Patient will participate in upper extremity therapeutic exercise/activities with modified independence for 5 minutes within 7 day(s). 7.  Patient will utilize energy conservation techniques during functional activities with verbal cues within 7 day(s). Occupational Therapy TREATMENT Patient: Alejandra Gerard (85 y.o. male) Date: 1/12/2018 Diagnosis: Elevated troponin Accelerated hypertension Altered mental status UTI (urinary tract infection) Encephalopathy UTI (urinary tract infection) Orthostatic hypotension Precautions: Fall Chart, occupational therapy assessment, plan of care, and goals were reviewed. ASSESSMENT: 
Pt set up to eat lunch in bed. After using fork to feed himself vegetables pt asked whether this therapist would feed him his soup. Encouraged him to  his spoon and try this himself. Pt was successful feeding himself 5 bites of soup with minimal spillage. He than asked for ice cream. Pt continues to take additional time for tasks and requires increased time to answer questions. Recommend SNF. Progression toward goals: 
[]          Improving appropriately and progressing toward goals [x]          Improving slowly and progressing toward goals 
[]          Not making progress toward goals and plan of care will be adjusted PLAN: Patient continues to benefit from skilled intervention to address the above impairments. Continue treatment per established plan of care. Discharge Recommendations:  SNF Further Equipment Recommendations for Discharge: None SUBJECTIVE:  
Patient stated  \" Will you see if there is any ice cream?. OBJECTIVE DATA SUMMARY:  
Cognitive/Behavioral Status: 
Neurologic State: Alert Orientation Level: Oriented X4 Cognition: Decreased attention/concentration Functional Mobility and Transfers for ADLs: 
 Bed Mobility: Not tested Transfers: 
 Not tested ADL Intervention: 
Feeding Feeding Assistance: Supervision/set-up Cues: Verbal cues provided, pt asked this writer to feed him his soup however with encouragement pt picked up spoon and fed himself with minimal spillage. Pt needing additional time to complete tasks and seems to have delayed response time for questions. Pain: 
Pain Scale 1: Numeric (0 - 10) Pain Intensity 1: 0 Activity Tolerance:   
Fair Please refer to the flowsheet for vital signs taken during this treatment. After treatment:  
[]  Patient left in no apparent distress sitting up in chair 
[x]  Patient left in no apparent distress in bed 
[x]  Call bell left within reach 
[]  Nursing notified 
[]  Caregiver present 
[]  Bed alarm activated COMMUNICATION/COLLABORATION:  
The patients plan of care was discussed with: Occupational Therapist 
 
[address] [place]/L Time Calculation: 20 mins

## 2018-01-12 NOTE — PROGRESS NOTES
Primary Nurse Shameka Gao RN and Rey Mcintosh RN performed a dual skin assessment on this patient No impairment noted Song score is 19, eschar area noted on left heal, no other wounds noted at this time.

## 2018-01-12 NOTE — DISCHARGE INSTRUCTIONS
[facility] / Pamela Ybarra / [id number] : 1935    Admission date: 2018 Discharge date: 2018       Primary care provider: Shruti Suresh MD    Discharging provider:  Glendy Amanda MD  - Family Medicine Resident  Russell Villatoro MD - Attending, Family Medicine   . . . . . . . . . . . . . . . . . . . . . . . . . . . . . . . . . . . . . . . . . . . . . . . . . . . . . . . . . . . . . . . . . . . . . . . Suresh Jeffries FINAL DIAGNOSES & HOSPITAL COURSE:  Encephalopathy likely 2/2 Complicated UTI - Resolved. CT Head neg for acute process but signficant for microvascular infarcts of undetermined age. POA UA significant for WBCs, moderate LE, 4+ bacteria. TSH 3.15. Orthostatics in ED positive for inadequate response of BP to positional changes. Echo EF 50% with mildly increased wall thickness. Carotid dopplers <50% ICA stenosis BL. UA showed moderate LE, >100WBC, 4+ bacteria. Ucx was not collected in ED. Pt was treated on 5 days of Rocephin. Neuro was consulted who did not recommend an MRI because it would likely not . Cardiology recommended discontinuing ARB and cutting back on Lasix or discontinuing if patient continued to have orthostatics. At discharge patient was back to baseline mentation per daughter and PT/OT recommended SNF. Daughter and patient were agreeable to SNF and patient was discharged to [facility].   - Follow up with PCP 1 week after discharge from SNF for BP and BMP       Accelerated Hypertension with orthostatic hypotension - Orthostatics in ED showed patient's BP did not respond adequately. Held home Lasix 40mg daily and gave IVF. Once patient was able to tolerate fluids and assured his kidney function was within normal limits discontinued IVF and resumed 1/2 of patient's home Lasix dose of 20mg. Cardiology recommended discontinuing home Losartan and increasing Labetalol to 300mg BID.  Cardio also recommended Jobst stockings and said to consider stopping lasix if orthostatics persists. - Labetalol 300mg BID  - Decreased Lasix from 40mg to 20mg daily  - Consider stopping lasix if orthostatics persists  - Follow up with PCP 1 week after discharge from SNF      History of DVT - Continued home Eliquis 2.5mg BID  - Continue home Eliquis 6.2OO BID      Systolic Heart Failure with reduced EF - Echo EF 50% with mildly increased wall thickness. POA CXR celar. ProBNP elevated 3407 (last admission 2968), may be elevated at BL. Cards was consulted who increased dose of Labetalol to 300mg BID and discontinued his home Losartan. Decreased Lasix from 40mg to 20mg daily. Cardiology recommended discontinuing Lasix if patient continues to be orthostatic.       At risk for TEVIN Stage 2 CKD - Resolved. POA GFR 37 (BL 60), Cr 2.08 (BL 1.1-1.4). Today Cr back to BL 1.05. Lasix was decreased from 40mg to 20mg daily once patient's kidney function returned to baseline.   - Decreased Lasix 20mg      ACS Rule out in patient with hypertroponinemia - 0.2, 0.18 and 0.26 respectively. Cards was consulted who stated not likely ACS. Increased Labetalol to 300mg BID and discontinued Losartan.       Diabetes Mellitus T2 -  A1c 6.4. Patient was diet controlled and started on SSI. Continued diabetic diet. Patient's BG was well controlled while admitted. HLD - , TG 51, , HDL 86. Continued Simvastatin 40mg daily.       Prolonged Qtc - Qtc of 495, Previous ECG on 4/7/17 showed qtc of 483.      Parkinson Disease -Progression of disease may be contributing to instability. Continued Sinemet 25-250mg tablet TID.     Depression- Continue  Zoloft 50mg qhs      Electrolyte Abnormalities   - Hypomagnesemia - Resolved, repleted as needed    FOLLOW-UP CARE RECOMMENDATIONS:  Follow-up Information     Follow up With Details Comments Contact Info    Nimesh Whitt NP Go on 2/2/2018 Neurology appt.  for  Parkinson's mgmt, with nurse practitioner:  Arrive: 1pm, Appt at: 130pm [address] [facility]  Neurology Clinic [address] [facility] Debra Jenkins      Fran Reyes MD Go in 3 days For transitional care appointment  [address] [place] [address]  [phone]              It is very important that you keep follow-up appointment(s). Bring discharge papers, medication list (and/or medication bottles) to follow-up appointments for review by outpatient provider(s). FOLLOW-UP TESTS RECOMMENDED:   - BMP, BP at PCP      ONGOING TREATMENT PLAN: Discontinue Losartan,  Continue Labetalol 300mg BID    PENDING TEST RESULTS:  At the time of discharge the following test results are still pending: None. Please review these results as they become available. Specific symptoms to watch for: chest pain, shortness of breath, fever, chills, nausea, vomiting, diarrhea, change in mentation, falling, weakness, bleeding. DIET:  Diabetic Diet    ACTIVITY:  Activity as tolerated and PT/OT Eval and Treat    WOUND CARE: None needed    EQUIPMENT needed:  None     INCIDENTAL FINDINGS:  None     GOALS OF CARE:    Eventual return to home/independent/assisted living     Long term SNF      Hospice   x  Rehab      Patient condition at discharge:   Functional status    Poor    x  Deconditioned      Independent   Cognition    Lucid     Forgetful (some sensescence)   x  Dementia   Catheters/lines (plus indication)    Sanders     PICC      PEG         Code status  X  Full code      DNR    . . . . . . . . . . . . . . . . . . . . . . . . . . . . . . . . . . . . . . . . . . . . . . . . . . . . . . . . . . . . . . . . . . . . . . . Arnav Santiago      CHRONIC MEDICAL CONDITIONS:  Problem List as of 1/13/2018  Date Reviewed: 1/9/2018          Codes Class Noted - Resolved    Leukopenia ICD-10-CM: D72.819  ICD-9-CM: 288.50  1/12/2018 - Present        Normocytic anemia ICD-10-CM: D64.9  ICD-9-CM: 285.9 1/12/2018 - Present        * (Principal)Acute metabolic encephalopathy [id number]-[id number]: G93.41  ICD-9-CM: 348.31  1/12/2018 - Present        Complicated UTI (urinary tract infection) ICD-10-CM: N39.0  ICD-9-CM: 599.0  1/9/2018 - Present        History of DVT (deep vein thrombosis) ICD-10-CM: Z86.718  ICD-9-CM: V12.51  4/11/2017 - Present        Acute on chronic diastolic CHF (congestive heart failure) (HCC) ICD-10-CM: I50.33  ICD-9-CM: 428.33, 428.0  4/7/2017 - Present        Diastolic dysfunction with chronic heart failure (HCC) ICD-10-CM: I50.32  ICD-9-CM: 428.32  12/21/2015 - Present        Mild anemia ICD-10-CM: D64.9  ICD-9-CM: 285.9  12/20/2015 - Present        Accelerated hypertension ICD-10-CM: I10  ICD-9-CM: 401.0  12/18/2015 - Present        Situational anxiety ICD-10-CM: F41.8  ICD-9-CM: 300.09  6/17/2015 - Present        BPH (benign prostatic hypertrophy) ICD-10-CM: N40.0  ICD-9-CM: 600.00  6/17/2015 - Present        Parkinsons disease ([facility] ICD-10-CM: Maurizio Grime  ICD-9-CM: 332.0  5/26/2015 - Present        Asthma, mild intermittent ICD-10-CM: J45.20  ICD-9-CM: 493.90  3/17/2015 - Present        Chronic lumbar pain ICD-10-CM: M54.5, G89.29  ICD-9-CM: 724.2, 338.29  3/11/2015 - Present        DJD (degenerative joint disease), lumbar ICD-10-CM: M47.816  ICD-9-CM: 721.3  2/6/2015 - Present        Diabetes mellitus type II, controlled ([facility] ICD-10-CM: E11.9  ICD-9-CM: 250.00  1/26/2015 - Present        Lipid disorder ICD-10-CM: E78.9  ICD-9-CM: 272.9  8/16/2012 - Present    Overview Signed 8/16/2012  1:36 PM by Sherren Honour, MD     LDL goal < 70             Abnormal EKG ICD-10-CM: R94.31  ICD-9-CM: 794.31  5/12/2011 - Present    Overview Signed 7/6/2011  5:47 PM by Crystal Graves MD     Echo 2d adult   5/19/11   mild-mod LVH, EF 50-55% (low normal).  Mod LAE, mild MR.      Nm cardiac spect w stress / rest mult   5/19/11   no inducible ischemia; LVEF 45%              Cardiac dysrhythmia, unspecified ICD-10-CM: I49.9  ICD-9-CM: 427.9  5/12/2011 - Present        HTN (hypertension) ICD-10-CM: I10  ICD-9-CM: 401.9  3/19/2011 - Present    Overview Signed 5/23/2011  8:08 AM by April Minaya MD     ECHO 5/2011 = LVH, EF 50%             ED (erectile dysfunction) ICD-10-CM: N52.9  ICD-9-CM: 607.84  3/19/2011 - Present        RAD (reactive airway disease) ICD-10-CM: J45.909  ICD-9-CM: 493.90  3/19/2011 - Present        H/O: GI bleed ICD-10-CM: Z87.19  ICD-9-CM: V12.79  5/5/2010 - Present        RESOLVED: Encephalopathy ICD-10-CM: G93.40  ICD-9-CM: 348.30  1/10/2018 - 1/12/2018        RESOLVED: Altered mental status ICD-10-CM: R41.82  ICD-9-CM: 780.97  1/9/2018 - 1/12/2018        RESOLVED: Elevated troponin ICD-10-CM: R74.8  ICD-9-CM: 790.6  1/9/2018 - 1/12/2018        RESOLVED: CHF exacerbation ([address] ICD-10-CM: I50.9  ICD-9-CM: 428.0  4/7/2017 - 4/8/2017        RESOLVED: Protein in urine ICD-10-CM: R80.9  ICD-9-CM: 791.0  5/28/2015 - 1/11/2016        RESOLVED: Hyperglycemia ICD-10-CM: R73.9  ICD-9-CM: 790.29  9/6/2012 - 3/11/2015    Overview Addendum 7/17/2014  5:13 PM by Alberto Vasquez MD     a1c 6.8 7/2014  Need to repeat to confirm T2DM             RESOLVED: Weight loss, unintentional ICD-10-CM: R63.4  ICD-9-CM: 783.21  7/6/2011 - 3/11/2015    Overview Signed 7/6/2011  5:45 PM by April Minaya MD     Saw nutritionist 6/2011 at [address]: Grief ICD-10-CM: F43.20  ICD-9-CM: 309.0  6/3/2011 - 3/11/2015    Overview Signed 6/3/2011 11:06 AM by April Minaya MD     Lost wife in 2011             RESOLVED: Prostatism ICD-10-CM: N40.0  ICD-9-CM: 600.90  6/3/2011 - 12/2/2015        RESOLVED: Orthostatic hypotension (Chronic) ICD-10-CM: I95.1  ICD-9-CM: 458.0  Unknown - 1/12/2018    Overview Signed 5/12/2011  2:55 PM by Sumaya Soto MD     Complaints of dizziness.  (lying) to 120 (standing) in 3/18/11.              RESOLVED: Hypotension, postural ICD-10-CM: I95.1  ICD-9-CM: 458.0  3/19/2011 - 1/12/2018 Overview Signed 7/6/2011  5:47 PM by Savi Wright MD     Echo 2d adult   5/19/11   mild-mod LVH, EF 50-55% (low normal). Mod LAE, mild MR.      Nm cardiac spect w stress / rest mult   5/19/11   no inducible ischemia; LVEF 45%              RESOLVED: Arthritis ICD-10-CM: M19.90  ICD-9-CM: 716.90  3/19/2011 - 12/2/2015              Information obtained by :   I understand that if any problems occur once I am at home I am to contact my physician. I understand and acknowledge receipt of the instructions indicated above.                                                                                                                                              Physician's or R.N.'s Signature                                                                  Date/Time                                                                                                                                              Patient or Representative Signature                                                          Date/Time

## 2018-01-12 NOTE — PROGRESS NOTES
Nurse attempted to complete the PTA medication review. Patient admitted for altered mental status. Awaiting assistance from family members.

## 2018-01-12 NOTE — PROGRESS NOTES
Bedside shift change report given to [address] (oncoming nurse) by Mariajose Pearl RN (offgoing nurse). Report included the following information SBAR, Kardex, MAR, Recent Results and Cardiac Rhythm NSR.

## 2018-01-13 VITALS
DIASTOLIC BLOOD PRESSURE: 68 MMHG | WEIGHT: 165.5 LBS | OXYGEN SATURATION: 96 % | TEMPERATURE: 97.7 F | SYSTOLIC BLOOD PRESSURE: 138 MMHG | BODY MASS INDEX: 23.69 KG/M2 | HEIGHT: 70 IN | RESPIRATION RATE: 16 BRPM | HEART RATE: 80 BPM

## 2018-01-13 LAB
ANION GAP SERPL CALC-SCNC: 9 MMOL/L (ref 5–15)
BUN SERPL-MCNC: 23 MG/DL (ref 6–20)
BUN/CREAT SERPL: 21 (ref 12–20)
CALCIUM SERPL-MCNC: 8.9 MG/DL (ref 8.5–10.1)
CHLORIDE SERPL-SCNC: 103 MMOL/L (ref 97–108)
CO2 SERPL-SCNC: 28 MMOL/L (ref 21–32)
CREAT SERPL-MCNC: 1.07 MG/DL (ref 0.7–1.3)
ERYTHROCYTE [DISTWIDTH] IN BLOOD BY AUTOMATED COUNT: 14.3 % (ref 11.5–14.5)
GLUCOSE BLD STRIP.AUTO-MCNC: 100 MG/DL (ref 65–100)
GLUCOSE BLD STRIP.AUTO-MCNC: 86 MG/DL (ref 65–100)
GLUCOSE BLD STRIP.AUTO-MCNC: 98 MG/DL (ref 65–100)
GLUCOSE SERPL-MCNC: 94 MG/DL (ref 65–100)
HCT VFR BLD AUTO: 32.6 % (ref 36.6–50.3)
HGB BLD-MCNC: 10.9 G/DL (ref 12.1–17)
MAGNESIUM SERPL-MCNC: 1.9 MG/DL (ref 1.6–2.4)
MCH RBC QN AUTO: 29.9 PG (ref 26–34)
MCHC RBC AUTO-ENTMCNC: 33.4 G/DL (ref 30–36.5)
MCV RBC AUTO: 89.6 FL (ref 80–99)
PHOSPHATE SERPL-MCNC: 3.7 MG/DL (ref 2.6–4.7)
PLATELET # BLD AUTO: 162 K/UL (ref 150–400)
POTASSIUM SERPL-SCNC: 4.1 MMOL/L (ref 3.5–5.1)
RBC # BLD AUTO: 3.64 M/UL (ref 4.1–5.7)
SERVICE CMNT-IMP: NORMAL
SODIUM SERPL-SCNC: 140 MMOL/L (ref 136–145)
WBC # BLD AUTO: 3.9 K/UL (ref 4.1–11.1)

## 2018-01-13 PROCEDURE — 82962 GLUCOSE BLOOD TEST: CPT

## 2018-01-13 PROCEDURE — 85027 COMPLETE CBC AUTOMATED: CPT | Performed by: FAMILY MEDICINE

## 2018-01-13 PROCEDURE — 84100 ASSAY OF PHOSPHORUS: CPT | Performed by: FAMILY MEDICINE

## 2018-01-13 PROCEDURE — 77030011256 HC DRSG MEPILEX <16IN NO BORD MOLN -A

## 2018-01-13 PROCEDURE — 74011250637 HC RX REV CODE- 250/637: Performed by: NURSE PRACTITIONER

## 2018-01-13 PROCEDURE — 36415 COLL VENOUS BLD VENIPUNCTURE: CPT | Performed by: FAMILY MEDICINE

## 2018-01-13 PROCEDURE — 80048 BASIC METABOLIC PNL TOTAL CA: CPT | Performed by: FAMILY MEDICINE

## 2018-01-13 PROCEDURE — 74011250637 HC RX REV CODE- 250/637: Performed by: HOSPITALIST

## 2018-01-13 PROCEDURE — 74011250637 HC RX REV CODE- 250/637: Performed by: FAMILY MEDICINE

## 2018-01-13 PROCEDURE — 83735 ASSAY OF MAGNESIUM: CPT | Performed by: FAMILY MEDICINE

## 2018-01-13 RX ORDER — FUROSEMIDE 20 MG/1
20 TABLET ORAL DAILY
Qty: 30 TAB | Refills: 0 | Status: ON HOLD | OUTPATIENT
Start: 2018-01-13 | End: 2018-07-14

## 2018-01-13 RX ORDER — CEPHALEXIN 500 MG/1
500 CAPSULE ORAL 4 TIMES DAILY
Qty: 40 CAP | Refills: 0 | Status: SHIPPED | OUTPATIENT
Start: 2018-01-13 | End: 2018-01-23

## 2018-01-13 RX ADMIN — APIXABAN 2.5 MG: 2.5 TABLET, FILM COATED ORAL at 17:28

## 2018-01-13 RX ADMIN — CARBIDOPA AND LEVODOPA 1 TABLET: 25; 250 TABLET ORAL at 09:43

## 2018-01-13 RX ADMIN — LABETALOL HCL 300 MG: 200 TABLET, FILM COATED ORAL at 17:28

## 2018-01-13 RX ADMIN — CARBIDOPA AND LEVODOPA 1 TABLET: 25; 250 TABLET ORAL at 16:21

## 2018-01-13 RX ADMIN — POTASSIUM & SODIUM PHOSPHATES POWDER PACK 280-160-250 MG 1 PACKET: 280-160-250 PACK at 12:43

## 2018-01-13 RX ADMIN — SERTRALINE HYDROCHLORIDE 50 MG: 50 TABLET ORAL at 09:44

## 2018-01-13 RX ADMIN — FUROSEMIDE 20 MG: 20 TABLET ORAL at 09:43

## 2018-01-13 RX ADMIN — APIXABAN 2.5 MG: 2.5 TABLET, FILM COATED ORAL at 09:43

## 2018-01-13 RX ADMIN — LOSARTAN POTASSIUM 50 MG: 50 TABLET ORAL at 09:43

## 2018-01-13 RX ADMIN — POTASSIUM & SODIUM PHOSPHATES POWDER PACK 280-160-250 MG 1 PACKET: 280-160-250 PACK at 17:28

## 2018-01-13 RX ADMIN — LABETALOL HCL 300 MG: 200 TABLET, FILM COATED ORAL at 09:43

## 2018-01-13 RX ADMIN — Medication 10 ML: at 05:41

## 2018-01-13 RX ADMIN — POTASSIUM & SODIUM PHOSPHATES POWDER PACK 280-160-250 MG 1 PACKET: 280-160-250 PACK at 09:43

## 2018-01-13 RX ADMIN — Medication 10 ML: at 12:43

## 2018-01-13 NOTE — ROUTINE PROCESS
Bedside and Verbal shift change report given to [address] (oncoming nurse) by Shell Sung RN (offgoing nurse). Report included the following information SBAR, Kardex, Procedure Summary, MAR and Recent Results.

## 2018-01-13 NOTE — PROGRESS NOTES
Patient discharged via wheelchair van. Patient's daughter has not called back. Patient made aware. I suggested he have the nurses try to call her to update her when she is off work. All patient questions and concerns addressed.

## 2018-01-13 NOTE — PROGRESS NOTES
Patient's daughter arrived at bedside to escort patient to rehab. When I started to review discharge instructions with the patient and his daughter, his daughter stated that she was not going to be taking the patient to rehab. She stated she wanted an ambulance because she felt unsafe transport the patient herself. Patient stated \"I thought this was all set up or I would have taken the day off work. \" I informed the patient and his daughter that I would call case management to report the concerns. While case management was kindly setting up a wheelchair S[facility] Oconnor for transport, the daughter left without communicating with staff. Wheelchair van set up for 1800. I informed the patient of this change, he was agreeable and pleasant. I attempted to call his daughter to inform her of the new plan, but I was not able to reach her. I let the patient know that I could not reach his daughter. Patient said, \"okay\".

## 2018-01-13 NOTE — PROGRESS NOTES
Bedside shift change report given to [facility] (oncoming nurse) by Samy Bahena (offgoing nurse). Report included the following information SBAR and Kardex.

## 2018-01-13 NOTE — PROGRESS NOTES
1602: 
Pt's daughter uncomfortable with transporting pt by herself and wanted transport set up. However, she left the hospital.  [place] was called to transport pt. It will be billed to Case Management ($60).  time around 6 pm.  RAMANDEEP Cannon 
 
CM Note: 
Spoke with Lei at [employer] to notify her pt is d/c-ing today. She requested RN call report.   RAMANDEEP Cannon

## 2018-01-13 NOTE — DISCHARGE SUMMARY
[address]                                                                                DISCHARGE SUMMARY     Patient: Alcus Soulier  MRN: [id number]  YOB: 1935  Age: 80 y.o. Date of admission:  1/8/2018  Date of discharge:  1/13/2018  Primary care provider:  Carly Hatfield MD   Admitting provider:  Magalis Fu MD    Discharging provider(s): Lynsey Edward MD - Family Medicine Resident  Dr. Nicole Brizuela MD -  Family Medicine Attending      Consultations:  [address] TO NEUROLOGY  IP CONSULT TO CARDIOLOGY  IP CONSULT TO CARDIOLOGY    Procedures:  · None    Chief Complaint:   · AMS    Discharge destination: SNF. The patient is stable for discharge. Admission diagnosis:  Elevated troponin  Accelerated hypertension  Altered mental status  UTI (urinary tract infection)  Encephalopathy  UTI (urinary tract infection)    HPI:   As per Dr. Amee Sheldon MD    Alcus Soulier is a 80 y.o. male with a h/o HTN, TIA, DM, dCHF, h/o DVT on Eliquis, Parkinson disease, and postural hypotension who presents with family to the ER complaining of slurred speech and altered MS. Family reports that they noticed an unsteady gait and slurred speech around 1830 on the night of admission. Also during that time, daughter states that pt was less interactive, almost nonverbal and became so dizzy that he was leaning to the left and had to be guided down to a seated position. Pt reports that he felt like he was a little weak and had a difficult time standing. When his daughter took his BP it was 86/50 which she found odd since he is normally hypertensive. Pt endorses some constipation, urine urgency, and chronic cough. Pt denies CP, fever, changes in oral intake, dysuria, abdominal pain, vision changes, unilateral weakness, or headache. PT's family states that pt is back to baseline mentation.       In the ER, vital signs were remarkable for /81. Labs were remarkable for Cr 2.08, glucose 54, GFR 37, trop 0.20, LA 1.6, and NT-Pro BNP 3407. CT of head showed no intracranial process but there were signs of microvascular infarcts of indeterminate age. CXR showed no acute process. UA was remarkable for 4+ bacteria, >100WBC with WBC casts, moderate LE, and few epithelial cells. Pt was treated with 1L NS bolus and was given 1 dose of rocephin. Blood and urine cx were obtained. Final discharge diagnoses and brief hospital course:   Encephalopathy likely 2/2 Complicated UTI - Resolved. CT Head neg for acute process but signficant for microvascular infarcts of undetermined age. POA UA significant for WBCs, moderate LE, 4+ bacteria. TSH 3.15. Orthostatics in ED positive for inadequate response of BP to positional changes. Echo EF 50% with mildly increased wall thickness. Carotid dopplers <50% ICA stenosis BL. UA showed moderate LE, >100WBC, 4+ bacteria. Ucx was not collected in ED. Pt was treated on 5 days of Rocephin. Neuro was consulted who did not recommend an MRI because it would likely not . Cardiology recommended discontinuing ARB and cutting back on Lasix or discontinuing if patient continued to have orthostatics. At discharge patient was back to baseline mentation per daughter and PT/OT recommended SNF. Daughter and patient were agreeable to SNF and patient was discharged to [facility] 1/13/18.   - Follow up with PCP 1 week after discharge from SNF for BP and BMP       Accelerated Hypertension with orthostatic hypotension - Orthostatics in ED showed patient's BP did not respond adequately. Held home Lasix 40mg daily and gave IVF. Once patient was able to tolerate fluids and assured his kidney function was within normal limits discontinued IVF and resumed 1/2 of patient's home Lasix dose of 20mg. Cardiology recommended discontinuing home Losartan and increasing Labetalol to 300mg BID.  Cardio also recommended Jobst stockings and said to consider stopping lasix if orthostatics persists. - Labetalol 300mg BID  - Decreased Lasix from 40mg to 20mg daily  - Consider stopping lasix if orthostatics persists  - Follow up with PCP 1 week after discharge from SNF      History of DVT - Continued home Eliquis 2.5mg BID  - Continue home Eliquis 5.7LK BID      Systolic Heart Failure with reduced EF - Echo EF 50% with mildly increased wall thickness. POA CXR celar. ProBNP elevated 3407 (last admission 2968), may be elevated at BL. Cards was consulted who increased dose of Labetalol to 300mg BID and discontinued his home Losartan. Decreased Lasix from 40mg to 20mg daily. Cardiology recommended discontinuing Lasix if patient continues to be orthostatic.       At risk for TEVIN Stage 2 CKD - Resolved. POA GFR 37 (BL 60), Cr 2.08 (BL 1.1-1.4). Today Cr back to BL 1.05. Lasix was decreased from 40mg to 20mg daily once patient's kidney function returned to baseline.   - Decreased Lasix 20mg      ACS Rule out in patient with hypertroponinemia - 0.2, 0.18 and 0.26 respectively. Cards was consulted who stated not likely ACS. Increased Labetalol to 300mg BID and discontinued Losartan.       Diabetes Mellitus T2 -  A1c 6.4. Patient was diet controlled and started on SSI. Continued diabetic diet. Patient's BG was well controlled while admitted. HLD - , TG 51, , HDL 86. Continued Simvastatin 40mg daily.       Prolonged Qtc - Qtc of 495, Previous ECG on 4/7/17 showed qtc of 483.      Parkinson Disease -Progression of disease may be contributing to instability. Continued Sinemet 25-250mg tablet TID.        Depression- Continue  Zoloft 50mg qhs      Electrolyte Abnormalities   - Hypomagnesemia - Resolved, repleted as needed    Diarrhea while on antibiotics : cdiff negative     Follow-up Cares:   · Pending LABS at the time of Discharge: None  · Labs Need to Repeat by PCP: BP, BMP    Follow-up Information     Follow up With Details Comments [address], NP Go on 2/2/2018 Neurology appt. for  Parkinson's mgmt, with nurse practitioner:  Meenuwong Arroyo: 1pm, [facility]: 130pm [facility] [address] [facility] 250  [facility] 170      Ludwig العراقي MD Go in 3 days For transitional care appointment  Jazmyn [address] [place] [address]  [phone]              Physical Examination at Discharge:     Visit Vitals    /77 (BP 1 Location: Left arm, BP Patient Position: At rest)    Pulse 75    Temp 98.3 °F (36.8 °C)    Resp 14    Ht 5' 10\" (1.778 m)    Wt 165 lb 8 oz (75.1 kg)    SpO2 98%    BMI 23.75 kg/m2       GEN: Patient is alert and oriented x2  NAD  HEENT:  Sclera clear, anicteric  Heart: RRR, S1 S2 noted. No M/R/G. Lungs: clear to auscultation and percussion throughout both lung fields  CV:normal  Abdomen  normal, soft, non-tender and no guarding   Extremeties:no clubbing, cyanosis, edema  Neuro no focal deficits  Skin:  warm       Pertinent Imaging Studies:     Ct Head Wo Cont    Result Date: 1/8/2018  EXAM:  CT HEAD WO CONT INDICATION: Altered mental status. COMPARISON: CT head 6/26/2011 TECHNIQUE: Axial noncontrast head CT from foramen magnum to vertex. Coronal and sagittal reformatted images were obtained. CT dose reduction was achieved through use of a standardized protocol tailored for this examination and automatic exposure control for dose modulation. FINDINGS:  There is diffuse age-related parenchymal volume loss. The ventricles and sulci are age-appropriate without hydrocephalus. There is no mass effect or midline shift. There is no intracranial hemorrhage or extra-axial fluid collection. Areas of low attenuation in the periventricular white matter represent progressive age-indeterminate microvascular ischemic changes. The gray-white matter differentiation is maintained. The basal cisterns are patent.  The osseous structures are intact. There is complete opacification of the left frontal sinus and patchy opacification in the left greater than right ethmoid air cells. The remaining visualized paranasal sinuses and mastoid air cells are clear. IMPRESSION: 1. There is no acute intracranial hemorrhage. 2. Progressive age-indeterminate microvascular ischemic changes in the periventricular white matter. 3. Left frontal and ethmoid paranasal sinus inflammatory changes. Xr Chest Port    Result Date: 1/9/2018  EXAM:  CR chest portable INDICATION:  Altered mental status. Elevated troponin. COMPARISON: 4/7/2017. TECHNIQUE: Portable AP upright chest view at 0114 hours FINDINGS: Cardiac monitoring wires overlie the thorax. The cardiomediastinal contours are stable. The lungs and pleural spaces are clear. There is no pneumothorax. The bones and upper abdomen are stable. IMPRESSION: No acute process. Stable exam.     ---------------------------------    Discharge Medications:      Current Discharge Medication List      CONTINUE these medications which have CHANGED    Details   furosemide (LASIX) 20 mg tablet Take 1 Tab by mouth daily. Qty: 30 Tab, Refills: 0         CONTINUE these medications which have NOT CHANGED    Details   labetalol (NORMODYNE) 300 mg tablet TAKE ONE TABLET BY MOUTH TWICE DAILY  Qty: 60 Tab, Refills: 5    Comments: PAST DUE FOR APPT  Associated Diagnoses: Essential hypertension      losartan (COZAAR) 50 mg tablet Take 1 Tab by mouth daily. Qty: 30 Tab, Refills: 5    Comments: PAST DUE FOR APPT  Associated Diagnoses: Essential hypertension      sertraline (ZOLOFT) 50 mg tablet TAKE ONE TABLET BY MOUTH ONCE DAILY FOR ANXIETY  Qty: 30 Tab, Refills: 5    Associated Diagnoses: Depression, unspecified depression type      simvastatin (ZOCOR) 40 mg tablet Take 1 Tab by mouth nightly.   Qty: 30 Tab, Refills: 5    Associated Diagnoses: Lipid disorder      apixaban (ELIQUIS) 2.5 mg tablet TAKE ONE TABLET BY MOUTH TWICE DAILY  Qty: 60 Tab, Refills: 12    Associated Diagnoses: Atrial fibrillation, unspecified type (HCC)      carbidopa-levodopa (SINEMET)  mg per tablet TAKE ONE TABLET BY MOUTH THREE TIMES DAILY  Qty: 90 Tab, Refills: 3    Associated Diagnoses: Parkinsons disease ([facility]      therapeutic multivitamin (THERAGRAN) tablet Take 1 Tab by mouth daily.              Chronic Diagnoses:    Problem List as of 1/13/2018  Date Reviewed: 1/9/2018          Codes Class Noted - Resolved    Leukopenia ICD-10-CM: D72.819  ICD-9-CM: 288.50  1/12/2018 - Present        Normocytic anemia ICD-10-CM: D64.9  ICD-9-CM: 285.9  1/12/2018 - Present        * (Principal)Acute metabolic encephalopathy QYW-50-VS: G93.41  ICD-9-CM: 348.31  1/12/2018 - Present        Complicated UTI (urinary tract infection) ICD-10-CM: N39.0  ICD-9-CM: 599.0  1/9/2018 - Present        History of DVT (deep vein thrombosis) ICD-10-CM: Z86.718  ICD-9-CM: V12.51  4/11/2017 - Present        Acute on chronic diastolic CHF (congestive heart failure) ([facility]) ICD-10-CM: I50.33  ICD-9-CM: 428.33, 428.0  4/7/2017 - Present        Diastolic dysfunction with chronic heart failure ([facility] ICD-10-CM: I50.32  ICD-9-CM: 428.32  12/21/2015 - Present        Mild anemia ICD-10-CM: D64.9  ICD-9-CM: 285.9  12/20/2015 - Present        Accelerated hypertension ICD-10-CM: I10  ICD-9-CM: 401.0  12/18/2015 - Present        Situational anxiety ICD-10-CM: F41.8  ICD-9-CM: 300.09  6/17/2015 - Present        BPH (benign prostatic hypertrophy) ICD-10-CM: N40.0  ICD-9-CM: 600.00  6/17/2015 - Present        Parkinsons disease ([facility] ICD-10-CM: Fabian Dirk  ICD-9-CM: 332.0  5/26/2015 - Present        Asthma, mild intermittent ICD-10-CM: J45.20  ICD-9-CM: 493.90  3/17/2015 - Present        Chronic lumbar pain ICD-10-CM: M54.5, G89.29  ICD-9-CM: 724.2, 338.29  3/11/2015 - Present        DJD (degenerative joint disease), lumbar ICD-10-CM: M47.816  ICD-9-CM: 721.3  2/6/2015 - Present        Diabetes mellitus type II, controlled ([facility] ICD-10-CM: E11.9  ICD-9-CM: 250.00  1/26/2015 - Present        Lipid disorder ICD-10-CM: E78.9  ICD-9-CM: 272.9  8/16/2012 - Present    Overview Signed 8/16/2012  1:36 PM by Ludwig العراقي MD     LDL goal < 70             Abnormal EKG ICD-10-CM: R94.31  ICD-9-CM: 794.31  5/12/2011 - Present    Overview Signed 7/6/2011  5:47 PM by Mary Blake MD     Echo 2d adult   5/19/11   mild-mod LVH, EF 50-55% (low normal).  Mod LAE, mild MR.      Nm cardiac spect w stress / rest mult   5/19/11   no inducible ischemia; LVEF 45%              Cardiac dysrhythmia, unspecified ICD-10-CM: I49.9  ICD-9-CM: 427.9  5/12/2011 - Present        HTN (hypertension) ICD-10-CM: I10  ICD-9-CM: 401.9  3/19/2011 - Present    Overview Signed 5/23/2011  8:08 AM by Mary Blake MD     ECHO 5/2011 = LVH, EF 50%             ED (erectile dysfunction) ICD-10-CM: N52.9  ICD-9-CM: 607.84  3/19/2011 - Present        RAD (reactive airway disease) ICD-10-CM: J45.909  ICD-9-CM: 493.90  3/19/2011 - Present        H/O: GI bleed ICD-10-CM: Z87.19  ICD-9-CM: V12.79  5/5/2010 - Present        RESOLVED: Encephalopathy ICD-10-CM: G93.40  ICD-9-CM: 348.30  1/10/2018 - 1/12/2018        RESOLVED: Altered mental status ICD-10-CM: R41.82  ICD-9-CM: 780.97  1/9/2018 - 1/12/2018        RESOLVED: Elevated troponin ICD-10-CM: R74.8  ICD-9-CM: 790.6  1/9/2018 - 1/12/2018        RESOLVED: CHF exacerbation ([facility] ICD-10-CM: I50.9  ICD-9-CM: 428.0  4/7/2017 - 4/8/2017        RESOLVED: Protein in urine ICD-10-CM: R80.9  ICD-9-CM: 791.0  5/28/2015 - 1/11/2016        RESOLVED: Hyperglycemia ICD-10-CM: R73.9  ICD-9-CM: 790.29  9/6/2012 - 3/11/2015    Overview Addendum 7/17/2014  5:13 PM by Ludwig العراقي MD     a1c 6.8 7/2014  Need to repeat to confirm T2DM             RESOLVED: Weight loss, unintentional ICD-10-CM: R63.4  ICD-9-CM: 783.21  7/6/2011 - 3/11/2015    Overview Signed 7/6/2011  5:45 PM by Mary Blake MD     Saw nutritionist 6/2011 at [facility]             RESOLVED: Grief ICD-10-CM: F43.20  ICD-9-CM: 309.0  6/3/2011 - 3/11/2015    Overview Signed 6/3/2011 11:06 AM by Edwin Null MD     Lost wife in 2011             RESOLVED: Prostatism ICD-10-CM: N40.0  ICD-9-CM: 600.90  6/3/2011 - 12/2/2015        RESOLVED: Orthostatic hypotension (Chronic) ICD-10-CM: I95.1  ICD-9-CM: 458.0  Unknown - 1/12/2018    Overview Signed 5/12/2011  2:55 PM by Jet Boyle MD     Complaints of dizziness.  (lying) to 120 (standing) in 3/18/11. RESOLVED: Hypotension, postural ICD-10-CM: I95.1  ICD-9-CM: 458.0  3/19/2011 - 1/12/2018    Overview Signed 7/6/2011  5:47 PM by Edwin Null MD     Echo 2d adult   5/19/11   mild-mod LVH, EF 50-55% (low normal). Mod LAE, mild MR.      Nm cardiac spect w stress / rest mult   5/19/11   no inducible ischemia; LVEF 45%              RESOLVED: Arthritis ICD-10-CM: M19.90  ICD-9-CM: 716.90  3/19/2011 - 12/2/2015              Signed:      Hayden Mcgowan MD   Family Medicine Resident      1/13/2018   1:36 PM     Cc: Maximus Bautista MD     Encounter Diagnoses     ICD-10-CM ICD-9-CM   1. Acute renal failure, unspecified acute renal failure type ([facility] N17.9 584.9   2. Elevated troponin R74.8 790.6   3. Urinary tract infection without hematuria, site unspecified N39.0 599.0   4. Transient cerebral ischemia, unspecified type G45.9 435.9   5.  Orthostatic hypertension I10 401.9

## 2018-01-14 LAB
BACTERIA SPEC CULT: NORMAL
SERVICE CMNT-IMP: NORMAL

## 2018-01-15 ENCOUNTER — PATIENT OUTREACH (OUTPATIENT)
Dept: FAMILY MEDICINE CLINIC | Age: 83
End: 2018-01-15

## 2018-01-15 NOTE — PROGRESS NOTES
Hospital Discharge Follow-Up Date/Time:     1/15/2018 11:43 AM 
 
Patient listed on discharge [facility]) report on 1/13/18. Patient discharged from [address] [place] [address] for Acute Metabolic Encephalopathy. RRAT score: High Risk   
      
 22 Total Score 3 Has Seen PCP in Last 6 Months (Yes=3, No=0)  
 4 IP Visits Last 12 Months (1-3=4, 4=9, >4=11) 15 Charlson Comorbidity Score (Age + Comorbid Conditions) Criteria that do not apply:  
 . Living with Significant Other. Assisted Living. LTAC. SNF. or  
Rehab Patient Length of Stay (>5 days = 3) Pt. Coverage (Medicare=5 , Medicaid, or Self-Pay=4) Medical History:    
Past Medical History:  
Diagnosis Date  Arthritis 5/5/2010  Diabetes mellitus type 2, diet-controlled ([facility]  DVT (deep venous thrombosis) ([facility] right LE DVT (mostly distal and non-occlusive) discovered 6/15  
 HTN (hypertension) 5/5/2010  
 hypertensive heart disease (LVH)  Hypercholesteremia 5/5/2010  in 4/09  Orthostatic hypotension Complaints of dizziness.  (lying) to 120 (standing) in 3/18/11.  Parkinson disease ([facility] diagnosed around middle of may 2015 Nurse Navigator(NN) contacted the patient by telephone to perform post hospital discharge assessment but was unable to reach this patient. Patient discharged to [facility] on 1/13/18. Outreach made to the [employer] and left a message for the  to return a call to this office for patient status updates and to complete a discharge assessment. Labs Reviewed: 
Recent Labs  
   01/13/18 
 7660 WBC  3.9* HGB  10.9* HCT  32.6*  
PLT  162 Recent Labs  
   01/13/18 
 0351 NA  140  
K  4.1 CL  103 CO2  28  
GLU  94 BUN  23* CREA  1.07  
CA  8.9 MG  1.9 PHOS  3.7 · Labs Need to Repeat by PCP: BP, BMP

## 2018-01-17 ENCOUNTER — PATIENT OUTREACH (OUTPATIENT)
Dept: CASE MANAGEMENT | Age: 83
End: 2018-01-17

## 2018-01-17 NOTE — PROGRESS NOTES
Community Care Team Documentation for Patient in [facility] Initial Follow Up Patient was admitted to [address] from 1/10 to 1/13. Patient was discharged to [address] [place] [address], on 1/13 (date). See previous [facility] Care Team documentation under Patient Outreach. Hospital Discharge diagnosis:  Encephalopathy likely 2/2 Complicated UTI 
 
RRAT score:  27 Advance Medical Directive on file in EMR? Not on file Total Hospitalizations/ED visits last 6 months? IP - 1; ED - 0 
 
PCP : Fran Reyes MD 
Nurse Navigator in PCP office: Sejal Staley Note routed to Nurse Navigator team. 
 
Per Sandra Alcocer and IDT at [facility], Reviewed [place] Back questions with SNF to ensure patient arrived with admission packet in order. Discussed upcoming follow up appointments: Cardiology 1/25 at 11:30AM and Neuro follow up 2/2 at 11:30AM.  Pt being skilled by PT/OT. Currently ambulating 100ft with RW and min assist. Transfers vary between mod to max assist. Pt lives with daughter in a split level home. Therapy to work on stairs prior to discharge. Tentative discharge date 2/9/18 pending progress. - CCT will keep an eye on d/c date and reschedule PCP FU as needed. Medications were not reconciled and general patient assessment was not completed during this skilled nursing facility outreach. HANNA Alexandre

## 2018-01-24 ENCOUNTER — PATIENT OUTREACH (OUTPATIENT)
Dept: FAMILY MEDICINE CLINIC | Age: 83
End: 2018-01-24

## 2018-01-24 NOTE — PROGRESS NOTES
Community Care Team Documentation for Patient in [facility] Subsequent Follow up Patient remains at [address] [facility]  ([facility]). See previous [facility] Team notes. PCP : Latasha Solomon MD 
Nurse Navigator in PCP office: Wendy Sung PCP follow up appointment:  2/13 at 1:00 PM 
 
Per Meghann Mark at [facility], PT/OT continue:  Currently patient is contact guard for transfers and bed mobility, ambulating 25 ft on level surface with rolling walker. Patient requires segmentation for task completion. Care plan meeting scheduled today with family. Tentative DC date 2/9 with [facility] if no preference. PCP follow up 2/13 at 1:00 PM. Medications were not reconciled and general patient assessment was not completed during this skilled nursing facility outreach. Maggie Lopez, MSN, RN, ACNS-BC, [facility] Nurse Navigator, [address] [place] [address] [phone]

## 2018-01-31 ENCOUNTER — PATIENT OUTREACH (OUTPATIENT)
Dept: CASE MANAGEMENT | Age: 83
End: 2018-01-31

## 2018-01-31 NOTE — PROGRESS NOTES
Community Care Team Documentation for Patient in [facility]  Subsequent Follow up     Patient remains at [facility] ([facility]). See previous [facility] Team notes. PCP : Delmy Alexandre MD  Nurse Navigator in PCP office: Tulio Encinas and IDT at [facility], PT/OT continue. Currently ambulating 100ft-150ft with contact guard assist. Practicing stairs. Projected discharge date 2/11-2/12 to transition to SBA rather than contact guard. PCP FU 2/13 at 1:00PM. Disposition: TBD. SNF to contact pt's daughter to confirm if pt will return to live with dtr. Medications were not reconciled and general patient assessment was not completed during this skilled nursing facility outreach.      ALEXI RamírezW

## 2018-02-07 ENCOUNTER — PATIENT OUTREACH (OUTPATIENT)
Dept: CASE MANAGEMENT | Age: 83
End: 2018-02-07

## 2018-02-07 NOTE — PROGRESS NOTES
Community Care Team Documentation for Patient in [facility]  Subsequent Follow up     Patient remains at [address] ([facility]). See previous [facility] Team notes. PCP : Alberto Vasquez MD  Nurse Navigator in PCP office: Nahid Rice at [facility], PT/OT continue. Currently ambulating 300ft with RW and SBA. Therapy has extended LOS to the end of February. Plan to return home with daughter in tri level home. Pt will need to be able to ascend and descend stairs prior to discharge. CCT to rescheduled PCP FU closer to discharge. Medications were not reconciled and general patient assessment was not completed during this skilled nursing facility outreach.      ALEXI GoldbergW

## 2018-02-14 ENCOUNTER — PATIENT OUTREACH (OUTPATIENT)
Dept: CASE MANAGEMENT | Age: 83
End: 2018-02-14

## 2018-02-14 NOTE — PROGRESS NOTES
Community Care Team Documentation for Patient in [facility]  Subsequent Follow up     Patient remains at [facility] ([facility]). See previous [facility] Team notes. PCP : Jefferson Borges MD  Nurse Navigator in PCP office: Mary Kraus at [facility], PT/OT continue. Currently ambulating 200ft with RW and contact guard assist for balance. Plan to return home with daughter and [facility]. Last therapy treat day 2/28. PCP FU scheduled for 3/2 at 10:20AM.    Medications were not reconciled and general patient assessment was not completed during this skilled nursing facility outreach.      ALEXI HernandezW

## 2018-02-21 ENCOUNTER — PATIENT OUTREACH (OUTPATIENT)
Dept: CASE MANAGEMENT | Age: 83
End: 2018-02-21

## 2018-02-21 NOTE — PROGRESS NOTES
Community Care Team Documentation for Patient in [facility]  Subsequent Follow up     Patient remains at [address] ([facility]). See previous [facility] Team notes. PCP : Martinez Og MD  Nurse Navigator in PCP office: Lynda Herrera    Per [facility] at [facility], PT/OT continue. Ambulating 100-200ft with RW and SBA. Ascending and descending 4-6 steps with SBA to contact guard assist. Therapy last treat day anticipated 3/1. Plan to discharge 3/2 home with daughter. PCP FU rescheduled for 3/5 at 10:20. [facility] TBD pending preference. Medications were not reconciled and general patient assessment was not completed during this skilled nursing facility outreach.      HANNA Bourgeois

## 2018-02-28 ENCOUNTER — PATIENT OUTREACH (OUTPATIENT)
Dept: FAMILY MEDICINE CLINIC | Age: 83
End: 2018-02-28

## 2018-02-28 NOTE — Clinical Note
DC planned for 3/2, PCP f/u 3/5. However, SNF staff overheard call to patient by son who said pt may not be leaving. Son may be working on alternate plan.

## 2018-02-28 NOTE — PROGRESS NOTES
Community Care Team Documentation for Patient in [facility] Subsequent Follow up Patient remains at [address] of [facility] ([facility]). See previous [facility] Team notes. PCP : Kim Kelley MD 
Nurse Navigator in PCP office: Ben Garcia PCP follow up appointment:  3/5 at 10:20 Per Russell Lou at [facility], PT/OT continue. Currently patient is ambulating 300 ft with rolling walker at supervision, and able to navigate 10 steps, up and down. Plan for DC 3/2 to home with 24/7 care by daughter. Home Health TBD. PCP follow up 3/5 at 10:20 AM. Medications were not reconciled and general patient assessment was not completed during this skilled nursing facility outreach. Can Lopez, MSN, RN, ACNS-BC, [facility] Nurse Navigator, [address] [place] [address] [phone]

## 2018-03-07 ENCOUNTER — PATIENT OUTREACH (OUTPATIENT)
Dept: FAMILY MEDICINE CLINIC | Age: 83
End: 2018-03-07

## 2018-03-07 NOTE — PROGRESS NOTES
Community Care Team Documentation for Patient in [facility] Discharge Note Per SNF staff, patient discharged from [address] [facility] ([facility]). See previous [facility] Team notes. PCP : Andra Weiss MD 
 
PCP BREANNE follow up appointment:  3/5 at 10:20 AM  
 
Nurse Navigator in PCP office: Buster Bamberger Note routed to Nurse Navigator team. 
 
Per Yumiko Turcios and team at [facility], Confirmed pt discharged Friday 3/2 home with [facility] and 24/7 care provided by daughter. PCP follow up 3/5 at 10:20 AM.. Community Care Team will sign off at this time. Medications were not reconciled and general patient assessment was not completed during this skilled nursing facility outreach. Vanessa Lopez, MSN, RN, ACNS-BC, [place] [facility] Nurse Navigator, [address] [place] [address] [phone]

## 2018-03-09 ENCOUNTER — TELEPHONE (OUTPATIENT)
Dept: FAMILY MEDICINE CLINIC | Age: 83
End: 2018-03-09

## 2018-03-09 NOTE — TELEPHONE ENCOUNTER
Elbert Rosales, ph [phone]    She called to say that he was not admitted to home health as the patient was not reachable by phone to contact him and they have not been out.

## 2018-06-28 ENCOUNTER — HOSPITAL ENCOUNTER (EMERGENCY)
Age: 83
Discharge: HOME | End: 2018-06-28
Attending: EMERGENCY MEDICINE
Payer: MEDICARE

## 2018-06-28 VITALS
SYSTOLIC BLOOD PRESSURE: 106 MMHG | TEMPERATURE: 97.5 F | RESPIRATION RATE: 18 BRPM | HEIGHT: 66 IN | DIASTOLIC BLOOD PRESSURE: 55 MMHG | OXYGEN SATURATION: 99 % | WEIGHT: 180 LBS | HEART RATE: 70 BPM | BODY MASS INDEX: 28.93 KG/M2

## 2018-06-28 DIAGNOSIS — G20 PARKINSONS DISEASE (HCC): ICD-10-CM

## 2018-06-28 DIAGNOSIS — E86.0 MILD DEHYDRATION: Primary | ICD-10-CM

## 2018-06-28 LAB
ANION GAP SERPL CALC-SCNC: 6 MMOL/L (ref 5–15)
APPEARANCE UR: CLEAR
BACTERIA URNS QL MICRO: NEGATIVE /HPF
BASOPHILS # BLD: 0 K/UL (ref 0–0.1)
BASOPHILS NFR BLD: 1 % (ref 0–1)
BILIRUB UR QL: NEGATIVE
BUN SERPL-MCNC: 23 MG/DL (ref 6–20)
BUN/CREAT SERPL: 17 (ref 12–20)
CALCIUM SERPL-MCNC: 9.4 MG/DL (ref 8.5–10.1)
CHLORIDE SERPL-SCNC: 108 MMOL/L (ref 97–108)
CO2 SERPL-SCNC: 31 MMOL/L (ref 21–32)
COLOR UR: NORMAL
CREAT SERPL-MCNC: 1.35 MG/DL (ref 0.7–1.3)
DIFFERENTIAL METHOD BLD: ABNORMAL
EOSINOPHIL # BLD: 0.1 K/UL (ref 0–0.4)
EOSINOPHIL NFR BLD: 2 % (ref 0–7)
EPITH CASTS URNS QL MICRO: NORMAL /LPF
ERYTHROCYTE [DISTWIDTH] IN BLOOD BY AUTOMATED COUNT: 15.1 % (ref 11.5–14.5)
GLUCOSE SERPL-MCNC: 73 MG/DL (ref 65–100)
GLUCOSE UR STRIP.AUTO-MCNC: NEGATIVE MG/DL
HCT VFR BLD AUTO: 38.5 % (ref 36.6–50.3)
HGB BLD-MCNC: 12 G/DL (ref 12.1–17)
HGB UR QL STRIP: NEGATIVE
HYALINE CASTS URNS QL MICRO: NORMAL /LPF (ref 0–5)
IMM GRANULOCYTES # BLD: 0 K/UL (ref 0–0.04)
IMM GRANULOCYTES NFR BLD AUTO: 0 % (ref 0–0.5)
KETONES UR QL STRIP.AUTO: NEGATIVE MG/DL
LEUKOCYTE ESTERASE UR QL STRIP.AUTO: NEGATIVE
LYMPHOCYTES # BLD: 1.2 K/UL (ref 0.8–3.5)
LYMPHOCYTES NFR BLD: 32 % (ref 12–49)
MCH RBC QN AUTO: 29.5 PG (ref 26–34)
MCHC RBC AUTO-ENTMCNC: 31.2 G/DL (ref 30–36.5)
MCV RBC AUTO: 94.6 FL (ref 80–99)
MONOCYTES # BLD: 0.5 K/UL (ref 0–1)
MONOCYTES NFR BLD: 13 % (ref 5–13)
NEUTS SEG # BLD: 1.9 K/UL (ref 1.8–8)
NEUTS SEG NFR BLD: 52 % (ref 32–75)
NITRITE UR QL STRIP.AUTO: NEGATIVE
NRBC # BLD: 0 K/UL (ref 0–0.01)
NRBC BLD-RTO: 0 PER 100 WBC
PH UR STRIP: 5.5 [PH] (ref 5–8)
PLATELET # BLD AUTO: 165 K/UL (ref 150–400)
PMV BLD AUTO: 11.8 FL (ref 8.9–12.9)
POTASSIUM SERPL-SCNC: 4 MMOL/L (ref 3.5–5.1)
PROT UR STRIP-MCNC: NEGATIVE MG/DL
RBC # BLD AUTO: 4.07 M/UL (ref 4.1–5.7)
RBC #/AREA URNS HPF: NORMAL /HPF (ref 0–5)
SODIUM SERPL-SCNC: 145 MMOL/L (ref 136–145)
SP GR UR REFRACTOMETRY: 1.01 (ref 1–1.03)
UA: UC IF INDICATED,UAUC: NORMAL
UROBILINOGEN UR QL STRIP.AUTO: 1 EU/DL (ref 0.2–1)
WBC # BLD AUTO: 3.6 K/UL (ref 4.1–11.1)
WBC URNS QL MICRO: NORMAL /HPF (ref 0–4)

## 2018-06-28 PROCEDURE — 99283 EMERGENCY DEPT VISIT LOW MDM: CPT

## 2018-06-28 PROCEDURE — 74011250636 HC RX REV CODE- 250/636: Performed by: EMERGENCY MEDICINE

## 2018-06-28 PROCEDURE — 80048 BASIC METABOLIC PNL TOTAL CA: CPT | Performed by: EMERGENCY MEDICINE

## 2018-06-28 PROCEDURE — 36415 COLL VENOUS BLD VENIPUNCTURE: CPT | Performed by: EMERGENCY MEDICINE

## 2018-06-28 PROCEDURE — 96360 HYDRATION IV INFUSION INIT: CPT

## 2018-06-28 PROCEDURE — 85025 COMPLETE CBC W/AUTO DIFF WBC: CPT | Performed by: EMERGENCY MEDICINE

## 2018-06-28 PROCEDURE — 81001 URINALYSIS AUTO W/SCOPE: CPT | Performed by: EMERGENCY MEDICINE

## 2018-06-28 RX ADMIN — SODIUM CHLORIDE 500 ML: 900 INJECTION, SOLUTION INTRAVENOUS at 17:47

## 2018-06-28 NOTE — PROGRESS NOTES
Date of previous inpatient admission/ ED visit? 1/2018 What brought the patient back to ED? Fatigue, weakness Did patient decline recommended services during last admission/ ED visit (if yes, what)? No. He was admitted then to the [facility] Has patient seen a provider since their last inpatient admission/ED visit (if yes, when)? yes CM Interventions: assessment From previous inpatient admission/ED visit: n/a From current inpatient admission/ED visit: n/a MSW obtained history from the daughter, Zaire Enamorado who he lives with in a 2nd floor apt with 15 steps to enter. Dtr works but her adult children are there with the patient. Dtr reports that the patient has Parkinson's and dementia. He uses a walker and has a cane, hospital bed, wheelchair, bedside commode and shower chair. He had [facility] a year ago. His PCP is Dr. Mikie Perry. Notified NN. He uses [facility] [address].  
 
Care Management Interventions PCP Verified by CM: Yes (Dr. Mikie Perry) Current Support Network: Relative's Home Plan discussed with Pt/Family/Caregiver: Yes Discharge Location Discharge Placement: Unable to determine at this time BRENDA Vickers

## 2018-06-28 NOTE — ED TRIAGE NOTES
Pt presents with daughter for generalized weakness. Pt has hx of parkinsons and dementia. Pt's daughter reports the pt has been shaking his legs, and increasing difficulty with his balance and gait.

## 2018-06-28 NOTE — ED PROVIDER NOTES
HPI Comments: 80 y.o. male with past medical history significant for HTN, hypercholesteremia and Parkinson's who presents via private vehicle with chief complaint of weakness. Daughter states that pt has had recent progressively worsening fatigue, weakness, gait problem and hallucinations. Daughter explains that pt told her that \"the chinese were not going to sell us hotdogs anymore\". Daughter also notes urinary frequency in pt and pt reports dysuria upon prompting. Daughter explains pt has chronic cough, which remains unchanged today. Daughter states pt is compliant with his medications but she has to struggle to get him to take them. Daughter denies fever in pt. Pt denies abd pain, CP and SOB. Daughter denies pt has had recent fall or injury. Daughter denies pt is followed by a neurologist. There are no other acute medical concerns at this time. Social hx: lives with daughter, former smoker, EtOH use (rarely) PCP: Cami Whittaker MD 
 
Note written by Noé Gray, as dictated by Rebeca Medina MD 4:11 PM 
 
 
The history is provided by the patient. Past Medical History:  
Diagnosis Date  Arthritis 5/5/2010  Diabetes mellitus type 2, diet-controlled ([address]  DVT (deep venous thrombosis) ([address] right LE DVT (mostly distal and non-occlusive) discovered 6/15  
 HTN (hypertension) 5/5/2010  
 hypertensive heart disease (LVH)  Hypercholesteremia 5/5/2010  in 4/09  Orthostatic hypotension Complaints of dizziness.  (lying) to 120 (standing) in 3/18/11.  Parkinson disease ([address] diagnosed around middle of may 2015 No past surgical history on file. Family History:  
Problem Relation Age of Onset  Heart Disease Mother  Diabetes Mother  Cancer Mother  Heart Disease Father  Asthma Father Social History Social History  Marital status:    Spouse name: N/A  
 Number of children: N/A  
 Years of education: N/A Occupational History  Not on file. Social History Main Topics  Smoking status: Former Smoker Packs/day: 0.30 Years: 5.00 Types: Cigarettes, Pipe  Smokeless tobacco: Former User Types: Snuff  Alcohol use 0.0 oz/week  
  0 Standard drinks or equivalent per week Comment: very rarely  Drug use: No  
 Sexual activity: Not Currently Other Topics Concern  Not on file Social History Narrative Lives with daughter, Salud Augustine ALLERGIES: Review of patient's allergies indicates no known allergies. Review of Systems Constitutional: Positive for fatigue. Negative for fever. Respiratory: Positive for cough. Negative for shortness of breath. Cardiovascular: Negative for chest pain. Genitourinary: Positive for dysuria and frequency. Musculoskeletal: Positive for gait problem. Neurological: Positive for weakness. Psychiatric/Behavioral: Positive for hallucinations. All other systems reviewed and are negative. Vitals:  
 06/28/18 1515 BP: 106/55 Pulse: 70 Resp: 18 Temp: 97.5 °F (36.4 °C) SpO2: 99% Weight: 81.6 kg (180 lb) Height: 5' 6\" (1.676 m) Physical Exam  
Constitutional: He is oriented to person, place, and time. He appears well-developed and well-nourished. No distress. HENT:  
Head: Normocephalic and atraumatic. Eyes: Conjunctivae are normal.  
Neck: Neck supple. No tracheal deviation present. Cardiovascular: Normal rate, regular rhythm and normal heart sounds. Pulmonary/Chest: Effort normal and breath sounds normal. No respiratory distress. Abdominal: He exhibits no distension. Musculoskeletal: Normal range of motion. He exhibits edema (2+ pitting BLE). Neurological: He is alert and oriented to person, place, and time. Nl  and BL upper extremity strength Skin: Skin is warm and dry. Psychiatric: He has a normal mood and affect. Nursing note and vitals reviewed. Note written by Micah Simmons. Riki Guillaume, as dictated by Juan Do MD 4:11 PM 
 
MDM 
 
80 y.o. male presents with increasing difficulty with ambulation. Chronically ill appearing. He has been having urinary frequency. No signs of UTI on UA. Labs reassuring with only signs of mild dehydration with slight Cr increase and Hb higher than previous. Will provide small fluid bolus and recommend PCP f/u as he may need medication dosing adjustment given progressing symptoms that I feel may be following the natural progression of his parkinson's. Plan to follow up with PCP as needed and return precautions discussed for worsening or new concerning symptoms. ED Course Procedures

## 2018-06-28 NOTE — DISCHARGE INSTRUCTIONS
Dehydration: Care Instructions  Your Care Instructions  Dehydration happens when your body loses too much fluid. This might happen when you do not drink enough water or you lose large amounts of fluids from your body because of diarrhea, vomiting, or sweating. Severe dehydration can be life-threatening. Water and minerals called electrolytes help put your body fluids back in balance. Learn the early signs of fluid loss, and drink more fluids to prevent dehydration. Follow-up care is a key part of your treatment and safety. Be sure to make and go to all appointments, and call your doctor if you are having problems. It's also a good idea to know your test results and keep a list of the medicines you take. How can you care for yourself at home? · To prevent dehydration, drink plenty of fluids, enough so that your urine is light yellow or clear like water. Choose water and other caffeine-free clear liquids until you feel better. If you have kidney, heart, or liver disease and have to limit fluids, talk with your doctor before you increase the amount of fluids you drink. · If you do not feel like eating or drinking, try taking small sips of water, sports drinks, or other rehydration drinks. · Get plenty of rest.  To prevent dehydration  · Add more fluids to your diet and daily routine, unless your doctor has told you not to. · During hot weather, drink more fluids. Drink even more fluids if you exercise a lot. Stay away from drinks with alcohol or caffeine. · Watch for the symptoms of dehydration. These include:  ¨ A dry, sticky mouth. ¨ Dark yellow urine, and not much of it. ¨ Dry and sunken eyes. ¨ Feeling very tired. · Learn what problems can lead to dehydration. These include:  ¨ Diarrhea, fever, and vomiting. ¨ Any illness with a fever, such as pneumonia or the flu. ¨ Activities that cause heavy sweating, such as endurance races and heavy outdoor work in hot or humid weather.   ¨ Alcohol or drug abuse or withdrawal.  ¨ Certain medicines, such as cold and allergy pills (antihistamines), diet pills (diuretics), and laxatives. ¨ Certain diseases, such as diabetes, cancer, and heart or kidney disease. When should you call for help? Call 911 anytime you think you may need emergency care. For example, call if:  ? · You passed out (lost consciousness). ?Call your doctor now or seek immediate medical care if:  ? · You are confused and cannot think clearly. ? · You are dizzy or lightheaded, or you feel like you may faint. ? · You have signs of needing more fluids. You have sunken eyes and a dry mouth, and you pass only a little dark urine. ? · You cannot keep fluids down. ? Watch closely for changes in your health, and be sure to contact your doctor if:  ? · You are not making tears. ? · Your skin is very dry and sags slowly back into place after you pinch it. ? · Your mouth and eyes are very dry. Where can you learn more? Go to http://silver-jesus.info/. Enter Z774 in the search box to learn more about \"Dehydration: Care Instructions. \"  Current as of: March 20, 2017  Content Version: 11.4  © 1847-8277 [employer]. Care instructions adapted under license by [employer] (which disclaims liability or warranty for this information). If you have questions about a medical condition or this instruction, always ask your healthcare professional. [place] [address] any warranty or liability for your use of this information. Movement Disorders: Exercises  Your Care Instructions  Here are some examples of exercises for problems with movement. Your doctor or physical therapist will tell you when you can start these exercises and which ones will work best for you. Problems with movement, or movement disorders, can happen along with many conditions, such as multiple sclerosis, Parkinson's disease, or damage from a stroke.  No matter what is making movement hard for you, these exercises can help you be more flexible, strong, and steady on your feet. Start each exercise slowly. Ease off the exercise if you start to have pain. How to do the exercises  Prayer stretch    1. Start with your palms together in front of your chest, just below your chin. 2. Slowly lower your hands toward your waistline, keeping your hands close to your stomach and your palms together until you feel a mild to moderate stretch under your forearms. 3. Hold for at least 15 to 30 seconds. Repeat 2 to 4 times. Shoulder stretch    1.  a doorway, and place one arm against the door frame. Your elbow should be a little higher than your shoulder. 2. Relax your shoulders as you lean forward, allowing your chest and shoulder muscles to stretch. You can also turn your body slightly away from your arm to stretch the muscles even more. 3. Hold for 15 to 30 seconds. 4. Repeat 2 to 4 times with each arm. Calf stretch    1. Place your hands on a wall for balance. You can also do this with your hands on the back of a chair or countertop. 2. Step back with your right leg. Keep the leg straight, and press your right heel into the floor. 3. Press your hips forward, bending your left leg slightly. You will feel the stretch in your right calf. 4. Hold the stretch 15 to 30 seconds. 5. Repeat 2 to 4 times with each leg. Hip flexor stretch (edge of table)    1. Lie flat on your back on a table or flat bench, with your knees and lower legs hanging off the edge of the table. 2. Grab one leg at the knee, and pull that knee back toward your chest. Relax the other leg. Let it hang down toward the floor until you feel a stretch in the upper thigh of that leg and hip. 3. Hold the stretch for at least 15 to 30 seconds. 4. Repeat 2 to 4 times for each leg. Back stretches    1. Get down on your hands and knees on the floor. 2. Relax your head, and allow it to droop.  Round your back up toward the ceiling until you feel a nice stretch in your upper, middle, and lower back. Hold this stretch for as long as it feels comfortable, or about 15 to 30 seconds. 3. Return to the starting position with a flat back while you are on your hands and knees. 4. Let your back sway by pressing your stomach toward the floor. Lift your buttocks toward the ceiling. 5. Hold this position for 15 to 30 seconds. 6. Repeat 2 to 4 times. Midback stretch    If you have knee pain, do not do this exercise. 1. Kneel on the floor, and sit back on your ankles. 2. Lean forward, place your hands on the floor, and stretch your arms out in front of you. Rest your head between your arms. 3. Gently push your chest toward the floor, reaching as far in front of you as you can. 4. Hold for 15 to 30 seconds. 5. Repeat 2 to 4 times. Press-up stretch    1. Lie on your stomach, supporting your body with your forearms. 2. Press your elbows down into the floor to raise your upper back. As you do this, relax your stomach muscles and allow your back to arch without using your back muscles. As you press up, do not let your hips or pelvis come off the floor. 3. Hold for 15 to 30 seconds, then relax. 4. Repeat 2 to 4 times. Chest and shoulder stretches    1. Put a few towels on top of one another and roll them together lengthwise. 2. Lie down, and place the roll of towels along the bones of your spine from your neck to your tailbone. Or lie on a foam roller if you have one. 3. Make sure that your head and tailbone area are supported with the roll of towels or on the foam roller. Be sure the towel roll or foam roller is in line with your spine. 4. Bend your knees to support your lower back. 5. Hold this position while you move your arms into the following positions:  1. Arms down at your sides, with the palms facing up. 2. Arms out to your sides in a \"T\" shape.   3. Arms out to your sides with your elbows bent to 90 degrees, as in a \"goalpost\" shape. 4. Arms stretched over your head. 6. Hold each arm position for 15 to 30 seconds. 7. Repeat the entire cycle of arm movements 2 to 4 times. Single knee-to-chest stretch    1. Lie on your back with your knees bent and your feet flat on the floor. You can put a small pillow under your head and neck if it is more comfortable. 2. Bring one knee to your chest, keeping the other foot flat on the floor. 3. Keep your lower back pressed to the floor. Hold for 15 to 30 seconds. 4. Relax, and lower the knee to the starting position. 5. Repeat with the other leg. Repeat 2 to 4 times with each leg. 6. To get more stretch, keep your other leg flat on the floor while pulling your knee to your chest.  Hip rotator stretch    1. Lie on your back with both knees bent and your feet flat on the floor. 2. Put the ankle of one leg on your opposite thigh near your knee. 3. Use your hand to gently push the raised knee away from your body until you feel a gentle stretch around your hip. 4. Hold the stretch for 15 to 30 seconds. 5. Repeat 2 to 4 times with each leg. Hamstring wall stretch    1. Lie on your back in a doorway, with your lower legs through the open door. 2. Slide the leg next to the doorway up the wall to straighten your knee. You should feel a gentle stretch down the back of your leg. 1. Do not arch your back. 2. Do not bend either knee. 3. Keep one heel touching the floor and the other heel touching the wall. Do not point your toes. 3. Hold the stretch for at least 1 minute to start. As you get used to it, work toward holding the stretch for as long as 6 minutes. 4. Do this exercise 2 to 4 times with one leg. Then move to the other side of the doorway to stretch the other leg. Hand flips for coordination    1. While seated, place your forearm and wrist on your thigh, palm down. 2. Flip your hand over so the back of your hand rests on your thigh and your palm is up.  Alternate between palm up and palm down while keeping your forearm on your thigh. 3. Repeat 8 to 12 times with each hand. Finger opposition for coordination    1. With one hand, point your fingers and thumb straight up. Your wrist should be relaxed, following the line of your fingers and thumb. 2. Touch your thumb to each finger, one finger at a time. This will look like an \"okay\" sign, but try to keep your other fingers straight and pointing upward as much as you can. 3. Repeat 8 to 12 times with each hand. Finger extension for coordination    1. Place your hand flat on a table. 2. Lift and then lower one finger at a time off the table. 3. Repeat 8 to 12 times with each hand. Shoulder blade squeeze for strength    1. Stand with your arms at your sides, and squeeze your shoulder blades together. Do not raise your shoulders up as you squeeze. 2. Hold 6 seconds. 3. Repeat 8 to 12 times. Bridging for strength    1. Lie on your back with both knees bent. Your knees should be bent about 90 degrees. 2. Then push your feet into the floor, squeeze your buttocks, and lift your hips off the floor until your shoulders, hips, and knees are all in a straight line. 3. Hold for about 6 seconds as you continue to breathe normally. 4. Slowly lower your hips back down to the floor. Rest for up to 10 seconds. 5. Repeat 8 to 12 times. Single-leg balance    1. Stand on a flat surface with your arms stretched out to your sides like you are making the letter \"T. \" Then lift one leg off the floor, bending it at the knee. If you are not steady on your feet, use one hand to hold on to a chair, counter, or wall. 2. Keep your standing knee straight. Try to balance on that leg for up to 30 seconds. Then rest for up to 10 seconds. 3. Repeat 6 to 8 times with each leg. 4. When you can balance on one leg for 30 seconds with your eyes open, try to balance on it with your eyes closed.   5. When you can do this exercise with your eyes closed for 30 seconds and with ease and no pain, try it while standing on a pillow or piece of foam.  Alternate arm and leg (bird dog) balance    Do this exercise slowly. Try to keep your body straight at all times. 1. Start on the floor, on your hands and knees. 2. Tighten your belly muscles by pulling your belly button in toward your spine. Be sure you continue to breathe normally and do not hold your breath. 3. Raise one arm off the floor, and hold it straight out in front of you. Be careful not to let your shoulder drop down, because that will twist your trunk. 4. Hold for about 6 seconds, then lower your arm and switch to your other arm. 5. Repeat 8 to 12 times with each arm. 6. When you can do this exercise with ease and no pain, try it with one leg raised off the floor. Hold your leg straight out behind you. Be careful not to let your hip drop down, because that will twist your trunk. 7. When holding your leg straight out becomes easier, try raising your opposite arm at the same time. Repeat steps 1 through 5. Balance-building exercise 1    1. Stand with a chair in front of you and a wall behind you, in case you lose your balance. 2. Stand with your feet together and your arms at your sides. 3. Move your head up and down 10 times. Balance-building exercise 2    1. Turn your head side to side 10 times. Balance-building exercise 3    1. Move your head diagonally up and down 10 times. Balance-building exercise 4    1. Move your head diagonally up and down 10 times on the other side. Follow-up care is a key part of your treatment and safety. Be sure to make and go to all appointments, and call your doctor if you are having problems. It's also a good idea to know your test results and keep a list of the medicines you take. Where can you learn more? Go to http://silver-jesus.info/. Enter S739 in the search box to learn more about \"Movement Disorders: Exercises. \"  Current as of: October 14, 2016  Content Version: 11.4  © 4070-5421 [employer]. Care instructions adapted under license by [employer] (which disclaims liability or warranty for this information). If you have questions about a medical condition or this instruction, always ask your healthcare professional. [address] any warranty or liability for your use of this information.

## 2018-07-03 ENCOUNTER — PATIENT OUTREACH (OUTPATIENT)
Dept: FAMILY MEDICINE CLINIC | Age: 83
End: 2018-07-03

## 2018-07-03 NOTE — PROGRESS NOTES
This NN left a message on Mr. Oh Butts answering machine to please call,  Then attempted to call his daughter, who was confirmed on [employer] form,  Sounded like a child answered the phone,  States 'you have the wrong number, and do not call back\"  Confirmed that the number was correctly dialed and it was the number listed for Ms. Maya Chavarria patients daughter.

## 2018-07-13 ENCOUNTER — HOSPITAL ENCOUNTER (INPATIENT)
Age: 83
LOS: 1 days | Discharge: NURSING, REHAB OR LONG-TERM CARE | DRG: 948 | End: 2018-07-17
Attending: EMERGENCY MEDICINE | Admitting: FAMILY MEDICINE
Payer: MEDICARE

## 2018-07-13 ENCOUNTER — APPOINTMENT (OUTPATIENT)
Dept: CT IMAGING | Age: 83
DRG: 948 | End: 2018-07-13
Attending: EMERGENCY MEDICINE
Payer: MEDICARE

## 2018-07-13 ENCOUNTER — APPOINTMENT (OUTPATIENT)
Dept: GENERAL RADIOLOGY | Age: 83
DRG: 948 | End: 2018-07-13
Attending: EMERGENCY MEDICINE
Payer: MEDICARE

## 2018-07-13 DIAGNOSIS — R53.1 WEAKNESS: Primary | ICD-10-CM

## 2018-07-13 LAB
ALBUMIN SERPL-MCNC: 3.4 G/DL (ref 3.5–5)
ALBUMIN/GLOB SERPL: 0.9 {RATIO} (ref 1.1–2.2)
ALP SERPL-CCNC: 90 U/L (ref 45–117)
ALT SERPL-CCNC: 42 U/L (ref 12–78)
ANION GAP SERPL CALC-SCNC: 6 MMOL/L (ref 5–15)
APPEARANCE UR: CLEAR
AST SERPL-CCNC: 55 U/L (ref 15–37)
ATRIAL RATE: 81 BPM
BACTERIA URNS QL MICRO: NEGATIVE /HPF
BASOPHILS # BLD: 0 K/UL (ref 0–0.1)
BASOPHILS NFR BLD: 0 % (ref 0–1)
BILIRUB SERPL-MCNC: 0.8 MG/DL (ref 0.2–1)
BILIRUB UR QL: NEGATIVE
BNP SERPL-MCNC: ABNORMAL PG/ML (ref 0–450)
BUN SERPL-MCNC: 31 MG/DL (ref 6–20)
BUN/CREAT SERPL: 24 (ref 12–20)
CALCIUM SERPL-MCNC: 9 MG/DL (ref 8.5–10.1)
CALCULATED P AXIS, ECG09: 59 DEGREES
CALCULATED R AXIS, ECG10: -2 DEGREES
CALCULATED T AXIS, ECG11: -70 DEGREES
CHLORIDE SERPL-SCNC: 107 MMOL/L (ref 97–108)
CO2 SERPL-SCNC: 32 MMOL/L (ref 21–32)
COLOR UR: NORMAL
CREAT SERPL-MCNC: 1.31 MG/DL (ref 0.7–1.3)
DIAGNOSIS, 93000: NORMAL
DIFFERENTIAL METHOD BLD: ABNORMAL
EOSINOPHIL # BLD: 0 K/UL (ref 0–0.4)
EOSINOPHIL NFR BLD: 1 % (ref 0–7)
EPITH CASTS URNS QL MICRO: NORMAL /LPF
ERYTHROCYTE [DISTWIDTH] IN BLOOD BY AUTOMATED COUNT: 15.3 % (ref 11.5–14.5)
GLOBULIN SER CALC-MCNC: 3.7 G/DL (ref 2–4)
GLUCOSE BLD STRIP.AUTO-MCNC: 85 MG/DL (ref 65–100)
GLUCOSE SERPL-MCNC: 123 MG/DL (ref 65–100)
GLUCOSE UR STRIP.AUTO-MCNC: NEGATIVE MG/DL
HCT VFR BLD AUTO: 39.2 % (ref 36.6–50.3)
HGB BLD-MCNC: 12.4 G/DL (ref 12.1–17)
HGB UR QL STRIP: NEGATIVE
IMM GRANULOCYTES # BLD: 0 K/UL (ref 0–0.04)
IMM GRANULOCYTES NFR BLD AUTO: 0 % (ref 0–0.5)
KETONES UR QL STRIP.AUTO: NEGATIVE MG/DL
LEUKOCYTE ESTERASE UR QL STRIP.AUTO: NEGATIVE
LYMPHOCYTES # BLD: 1.2 K/UL (ref 0.8–3.5)
LYMPHOCYTES NFR BLD: 36 % (ref 12–49)
MCH RBC QN AUTO: 29.5 PG (ref 26–34)
MCHC RBC AUTO-ENTMCNC: 31.6 G/DL (ref 30–36.5)
MCV RBC AUTO: 93.3 FL (ref 80–99)
MONOCYTES # BLD: 0.3 K/UL (ref 0–1)
MONOCYTES NFR BLD: 10 % (ref 5–13)
NEUTS SEG # BLD: 1.8 K/UL (ref 1.8–8)
NEUTS SEG NFR BLD: 52 % (ref 32–75)
NITRITE UR QL STRIP.AUTO: NEGATIVE
NRBC # BLD: 0 K/UL (ref 0–0.01)
NRBC BLD-RTO: 0 PER 100 WBC
P-R INTERVAL, ECG05: 148 MS
PH UR STRIP: 7 [PH] (ref 5–8)
PLATELET # BLD AUTO: 167 K/UL (ref 150–400)
PMV BLD AUTO: 10.3 FL (ref 8.9–12.9)
POTASSIUM SERPL-SCNC: 4.2 MMOL/L (ref 3.5–5.1)
PROT SERPL-MCNC: 7.1 G/DL (ref 6.4–8.2)
PROT UR STRIP-MCNC: NEGATIVE MG/DL
Q-T INTERVAL, ECG07: 428 MS
QRS DURATION, ECG06: 94 MS
QTC CALCULATION (BEZET), ECG08: 497 MS
RBC # BLD AUTO: 4.2 M/UL (ref 4.1–5.7)
RBC #/AREA URNS HPF: NORMAL /HPF (ref 0–5)
SERVICE CMNT-IMP: NORMAL
SODIUM SERPL-SCNC: 145 MMOL/L (ref 136–145)
SP GR UR REFRACTOMETRY: 1.01 (ref 1–1.03)
TROPONIN I SERPL-MCNC: 0.2 NG/ML
UR CULT HOLD, URHOLD: NORMAL
UROBILINOGEN UR QL STRIP.AUTO: 1 EU/DL (ref 0.2–1)
VENTRICULAR RATE, ECG03: 81 BPM
WBC # BLD AUTO: 3.3 K/UL (ref 4.1–11.1)
WBC URNS QL MICRO: NORMAL /HPF (ref 0–4)

## 2018-07-13 PROCEDURE — 74011250636 HC RX REV CODE- 250/636: Performed by: EMERGENCY MEDICINE

## 2018-07-13 PROCEDURE — 74011250637 HC RX REV CODE- 250/637: Performed by: STUDENT IN AN ORGANIZED HEALTH CARE EDUCATION/TRAINING PROGRAM

## 2018-07-13 PROCEDURE — 83880 ASSAY OF NATRIURETIC PEPTIDE: CPT

## 2018-07-13 PROCEDURE — 84484 ASSAY OF TROPONIN QUANT: CPT | Performed by: EMERGENCY MEDICINE

## 2018-07-13 PROCEDURE — 77030005563 HC CATH URETH INT MMGH -A

## 2018-07-13 PROCEDURE — 99218 HC RM OBSERVATION: CPT

## 2018-07-13 PROCEDURE — 93306 TTE W/DOPPLER COMPLETE: CPT | Performed by: STUDENT IN AN ORGANIZED HEALTH CARE EDUCATION/TRAINING PROGRAM

## 2018-07-13 PROCEDURE — 99285 EMERGENCY DEPT VISIT HI MDM: CPT

## 2018-07-13 PROCEDURE — 82962 GLUCOSE BLOOD TEST: CPT

## 2018-07-13 PROCEDURE — 80053 COMPREHEN METABOLIC PANEL: CPT | Performed by: EMERGENCY MEDICINE

## 2018-07-13 PROCEDURE — 36415 COLL VENOUS BLD VENIPUNCTURE: CPT | Performed by: EMERGENCY MEDICINE

## 2018-07-13 PROCEDURE — 71045 X-RAY EXAM CHEST 1 VIEW: CPT

## 2018-07-13 PROCEDURE — 51701 INSERT BLADDER CATHETER: CPT

## 2018-07-13 PROCEDURE — 70450 CT HEAD/BRAIN W/O DYE: CPT

## 2018-07-13 PROCEDURE — 93005 ELECTROCARDIOGRAM TRACING: CPT

## 2018-07-13 PROCEDURE — 85025 COMPLETE CBC W/AUTO DIFF WBC: CPT | Performed by: EMERGENCY MEDICINE

## 2018-07-13 PROCEDURE — 65270000029 HC RM PRIVATE

## 2018-07-13 PROCEDURE — 96360 HYDRATION IV INFUSION INIT: CPT

## 2018-07-13 PROCEDURE — 96361 HYDRATE IV INFUSION ADD-ON: CPT

## 2018-07-13 PROCEDURE — 65390000012 HC CONDITION CODE 44 OBSERVATION

## 2018-07-13 PROCEDURE — 81001 URINALYSIS AUTO W/SCOPE: CPT | Performed by: EMERGENCY MEDICINE

## 2018-07-13 PROCEDURE — 74011250637 HC RX REV CODE- 250/637: Performed by: EMERGENCY MEDICINE

## 2018-07-13 RX ORDER — SODIUM CHLORIDE 0.9 % (FLUSH) 0.9 %
5-10 SYRINGE (ML) INJECTION EVERY 8 HOURS
Status: DISCONTINUED | OUTPATIENT
Start: 2018-07-13 | End: 2018-07-17 | Stop reason: HOSPADM

## 2018-07-13 RX ORDER — LOSARTAN POTASSIUM 50 MG/1
50 TABLET ORAL DAILY
Status: DISCONTINUED | OUTPATIENT
Start: 2018-07-14 | End: 2018-07-17 | Stop reason: HOSPADM

## 2018-07-13 RX ORDER — FUROSEMIDE 20 MG/1
20 TABLET ORAL DAILY
Status: DISCONTINUED | OUTPATIENT
Start: 2018-07-13 | End: 2018-07-17 | Stop reason: HOSPADM

## 2018-07-13 RX ORDER — SERTRALINE HYDROCHLORIDE 50 MG/1
50 TABLET, FILM COATED ORAL DAILY
Status: DISCONTINUED | OUTPATIENT
Start: 2018-07-14 | End: 2018-07-17 | Stop reason: HOSPADM

## 2018-07-13 RX ORDER — SIMVASTATIN 20 MG/1
40 TABLET, FILM COATED ORAL
Status: DISCONTINUED | OUTPATIENT
Start: 2018-07-13 | End: 2018-07-17 | Stop reason: HOSPADM

## 2018-07-13 RX ORDER — FUROSEMIDE 40 MG/1
20 TABLET ORAL DAILY
Status: DISCONTINUED | OUTPATIENT
Start: 2018-07-14 | End: 2018-07-13

## 2018-07-13 RX ORDER — CARBIDOPA AND LEVODOPA 25; 250 MG/1; MG/1
1 TABLET ORAL 3 TIMES DAILY
Status: DISCONTINUED | OUTPATIENT
Start: 2018-07-13 | End: 2018-07-17 | Stop reason: HOSPADM

## 2018-07-13 RX ORDER — SODIUM CHLORIDE 0.9 % (FLUSH) 0.9 %
5-10 SYRINGE (ML) INJECTION AS NEEDED
Status: DISCONTINUED | OUTPATIENT
Start: 2018-07-13 | End: 2018-07-17 | Stop reason: HOSPADM

## 2018-07-13 RX ADMIN — CARBIDOPA AND LEVODOPA 1 TABLET: 25; 250 TABLET ORAL at 22:24

## 2018-07-13 RX ADMIN — SODIUM CHLORIDE 500 ML: 900 INJECTION, SOLUTION INTRAVENOUS at 14:08

## 2018-07-13 RX ADMIN — LABETALOL HYDROCHLORIDE 300 MG: 200 TABLET, FILM COATED ORAL at 19:41

## 2018-07-13 RX ADMIN — SIMVASTATIN 40 MG: 20 TABLET, FILM COATED ORAL at 22:24

## 2018-07-13 RX ADMIN — LABETALOL HYDROCHLORIDE 300 MG: 200 TABLET, FILM COATED ORAL at 17:11

## 2018-07-13 RX ADMIN — FUROSEMIDE 20 MG: 40 TABLET ORAL at 19:40

## 2018-07-13 RX ADMIN — Medication 10 ML: at 22:26

## 2018-07-13 NOTE — ED NOTES
Bedside and Verbal shift change report given to Jono Enriquez RN by Dariusz Méndez RN (offgoing nurse). Report included the following information SBAR, Kardex, ED Summary, Procedure Summary, Intake/Output, MAR, Recent Results, Med Rec Status and Cardiac Rhythm NSR.

## 2018-07-13 NOTE — H&P
[address] [facility]  
Admission H&P Date of admission: 7/13/2018 Patient name: Renita Ovalles MRN: [id number] YOB: 1935 Age: 80 y.o. Primary care provider:  Eri Zarate MD  
 
Source of Information: patient, medical records Chief complaint: \"I feel weak. \" History of Present Illness Renita Ovalles is a 80 y.o. male w/ history of HTN, dementia, Parkinson's, CYNTHIA, T2DM (diet controlled), HFpEF, DVT, HLD, and depression who presents to the ER complaining of generalized weakness. History is limited due to patient being poor historian. He reports that his daughter had him come to the ED because he was feeling weak. He feels that he felt weak yesterday, but feels a lot better today. He states that he has been feeling unsteady on his feet, and uses a walker to ambulate. He has fallen in the past, but has not hurt himself. He reports that he is in no pain. Pt endorses bilateral lower leg edema. Pt denies fevers, headaches, blurry vision, congestion, sore throat, CP, SOB, abdominal pain, diarrhea, constipation, nausea, vomiting, and dysuria. Because patient is poor historian, multiple attempts were made to contact patient's daughter with no answer. In the ER, vital signs were remarkable for BP of 183/119. Labs were remarkable for for a creatinine of 1.31 (BL 1.2) and glucose of 123. Troponin of 0.2. His UA was negative. Head CT and EKG were unremarkable. His CXR showed poor inspiratory effort with bibasilar mild interstitial prominence but not definite acute illness. Pt was treated with labetalol and a NS bolus. Home Medications Prior to Admission medications Medication Sig Start Date End Date Taking? Authorizing Provider  
furosemide (LASIX) 20 mg tablet Take 1 Tab by mouth daily.  1/13/18   Yogesh Bacon MD  
labetalol (NORMODYNE) 300 mg tablet TAKE ONE TABLET BY MOUTH TWICE DAILY 10/19/17   Maria Esther Engel MD  
losartan (COZAAR) 50 mg tablet Take 1 Tab by mouth daily. 10/19/17   Nubia Tinsley MD  
sertraline (ZOLOFT) 50 mg tablet TAKE ONE TABLET BY MOUTH ONCE DAILY FOR ANXIETY 10/19/17   Oleg Green MD  
simvastatin (ZOCOR) 40 mg tablet Take 1 Tab by mouth nightly. 10/19/17   Nubia Tinsley MD  
apixaban (ELIQUIS) 2.5 mg tablet TAKE ONE TABLET BY MOUTH TWICE DAILY 10/19/17   Oleg Green MD  
carbidopa-levodopa (SINEMET)  mg per tablet TAKE ONE TABLET BY MOUTH THREE TIMES DAILY 10/19/17   Oleg Green MD  
therapeutic multivitamin [facility]) tablet Take 1 Tab by mouth daily. Historical Provider Allergies No Known Allergies Past Medical History:  
Diagnosis Date  Arthritis 5/5/2010  Diabetes mellitus type 2, diet-controlled ([facility]  DVT (deep venous thrombosis) ([facility] right LE DVT (mostly distal and non-occlusive) discovered 6/15  
 HTN (hypertension) 5/5/2010  
 hypertensive heart disease (LVH)  Hypercholesteremia 5/5/2010  in 4/09  Orthostatic hypotension Complaints of dizziness.  (lying) to 120 (standing) in 3/18/11.  Parkinson disease ([facility] diagnosed around middle of may 2015 No past surgical history on file. Family History Problem Relation Age of Onset  Heart Disease Mother  Diabetes Mother  Cancer Mother  Heart Disease Father  Asthma Father Social History Patient resides Independently   
x  With family care - lives with daughter [facility]isted living SN[facility] Ambulates Independently With cane    
x  Assisted walker Alcohol history  
x  None Social  
  Chronic Smoking history 
x  None Former smoker Current smoker History Smoking Status  Former Smoker  Packs/day: 0.30  Years: 5.00  Types: Cigarettes, Pipe Smokeless Tobacco  
 Former User  Types: Snuff Drug history 
x  None Former drug user Current drug user Code status 
x  Full code DNR/DNI Partial   
Code status discussed with the patient. Review of Systems See HPI Physical Exam 
Visit Vitals  BP (!) 170/105  Pulse 79  Temp 98.2 °F (36.8 °C)  Resp 12  Wt 180 lb (81.6 kg)  SpO2 100%  BMI 29.05 kg/m2 General: No acute distress. Alert. Cooperative. Head: Normocephalic. Atraumatic. Eyes:  Conjunctiva pink. Sclera white. PERRL. Ears:  Ear canals patent. TM non-erythematous. Nose:  Septum midline. Mucosa pink. No drainage. Throat: Mucosa pink. Moist mucous membranes. No tonsillar exudates or erythema. Palate movement equal bilaterally. Neck: Supple. Normal ROM. No stiffness. Respiratory: CTAB. No w/r/r/c.  
Cardiovascular: RRR. Normal S1,S2. No m/r/g. Pulses 2+ throughout. GI: + bowel sounds. Nontender. No rebound tenderness or guarding. Nondistended. Extremities: 2+ pitting edema in BLE. No palpable cord. No tenderness. Musculoskeletal: Full ROM in all extremities. Neuro: CN II-XII grossly intact. Strength 5/5 in all extremities. Sensation intact in all extremities. Oriented to person, place, but not time. No resting tremor. Skin: Clear. No rashes. No ulcers. Laboratory Data Recent Results (from the past 24 hour(s)) CBC WITH AUTOMATED DIFF Collection Time: 07/13/18  1:28 PM  
Result Value Ref Range WBC 3.3 (L) 4.1 - 11.1 K/uL  
 RBC 4.20 4. 10 - 5.70 M/uL  
 HGB 12.4 12.1 - 17.0 g/dL HCT 39.2 36.6 - 50.3 % MCV 93.3 80.0 - 99.0 FL  
 MCH 29.5 26.0 - 34.0 PG  
 MCHC 31.6 30.0 - 36.5 g/dL  
 RDW 15.3 (H) 11.5 - 14.5 % PLATELET 343 240 - 045 K/uL MPV 10.3 8.9 - 12.9 FL  
 NRBC 0.0 0  WBC ABSOLUTE NRBC 0.00 0.00 - 0.01 K/uL NEUTROPHILS 52 32 - 75 % LYMPHOCYTES 36 12 - 49 % MONOCYTES 10 5 - 13 % EOSINOPHILS 1 0 - 7 % BASOPHILS 0 0 - 1 % IMMATURE GRANULOCYTES 0 0.0 - 0.5 % ABS. NEUTROPHILS 1.8 1.8 - 8.0 K/UL  
 ABS. LYMPHOCYTES 1.2 0.8 - 3.5 K/UL  
 ABS. MONOCYTES 0.3 0.0 - 1.0 K/UL  
 ABS. EOSINOPHILS 0.0 0.0 - 0.4 K/UL  
 ABS. BASOPHILS 0.0 0.0 - 0.1 K/UL  
 ABS. IMM. GRANS. 0.0 0.00 - 0.04 K/UL  
 DF AUTOMATED METABOLIC PANEL, COMPREHENSIVE Collection Time: 07/13/18  1:28 PM  
Result Value Ref Range Sodium 145 136 - 145 mmol/L Potassium 4.2 3.5 - 5.1 mmol/L Chloride 107 97 - 108 mmol/L  
 CO2 32 21 - 32 mmol/L Anion gap 6 5 - 15 mmol/L Glucose 123 (H) 65 - 100 mg/dL BUN 31 (H) 6 - 20 MG/DL Creatinine 1.31 (H) 0.70 - 1.30 MG/DL  
 BUN/Creatinine ratio 24 (H) 12 - 20 GFR est AA >60 >60 ml/min/1.73m2 GFR est non-AA 52 (L) >60 ml/min/1.73m2 Calcium 9.0 8.5 - 10.1 MG/DL Bilirubin, total 0.8 0.2 - 1.0 MG/DL  
 ALT (SGPT) 42 12 - 78 U/L  
 AST (SGOT) 55 (H) 15 - 37 U/L Alk. phosphatase 90 45 - 117 U/L Protein, total 7.1 6.4 - 8.2 g/dL Albumin 3.4 (L) 3.5 - 5.0 g/dL Globulin 3.7 2.0 - 4.0 g/dL A-G Ratio 0.9 (L) 1.1 - 2.2 URINALYSIS W/MICROSCOPIC Collection Time: 07/13/18  1:28 PM  
Result Value Ref Range Color YELLOW/STRAW Appearance CLEAR CLEAR Specific gravity 1.014 1.003 - 1.030    
 pH (UA) 7.0 5.0 - 8.0 Protein NEGATIVE  NEG mg/dL Glucose NEGATIVE  NEG mg/dL Ketone NEGATIVE  NEG mg/dL Bilirubin NEGATIVE  NEG Blood NEGATIVE  NEG Urobilinogen 1.0 0.2 - 1.0 EU/dL Nitrites NEGATIVE  NEG Leukocyte Esterase NEGATIVE  NEG    
 WBC 0-4 0 - 4 /hpf  
 RBC 0-5 0 - 5 /hpf Epithelial cells FEW FEW /lpf Bacteria NEGATIVE  NEG /hpf URINE CULTURE HOLD SAMPLE Collection Time: 07/13/18  1:28 PM  
Result Value Ref Range Urine culture hold URINE ON HOLD IN MICROBIOLOGY DEPT FOR 3 DAYS. IF UNPRESERVED URINE IS SUBMITTED, IT CANNOT BE USED FOR ADDITIONAL TESTING AFTER 24 HRS, RECOLLECTION WILL BE REQUIRED. TROPONIN I Collection Time: 07/13/18  1:28 PM  
Result Value Ref Range Troponin-I, Qt. 0.20 (H) <0.05 ng/mL NT-PRO BNP  Collection Time: 07/13/18  1:28 PM  
Result Value Ref Range NT pro-BNP 92355 (H) 0 - 450 PG/ML  
EKG, 12 LEAD, INITIAL Collection Time: 07/13/18  1:31 PM  
Result Value Ref Range Ventricular Rate 0 BPM  
 Atrial Rate 0 BPM  
 QRS Duration 0 ms Q-T Interval 0 ms QTC Calculation (Bezet) 0 ms Calculated R Axis 0 degrees Calculated T Axis 0 degrees Diagnosis ** No QRS complexes found, no ECG analysis possible ** When compared with ECG of 09-JAN-2018 06:48, 
Current undetermined rhythm precludes rhythm comparison, needs review GLUCOSE, POC Collection Time: 07/13/18  6:42 PM  
Result Value Ref Range Glucose (POC) 85 65 - 100 mg/dL Performed by [facility] CXR - IMPRESSION: 
Poor inspiratory effort with bibasilar mild interstitial prominence but no 
definite acute disease. CT Head w/o contrast - IMPRESSION: No acute intracranial hemorrhage or infarct. EKG:  NSR. Rate 81. Normal axis. Isolated T wave inversion in V6. Assessment and Plan Wesley Carmona is a 80 y.o. male w/ history of HTN, dementia, Parkinson's, CYNTHIA, T2DM (diet controlled), HFpEF, DVT, HLD, and depression who was admitted for generalized weakness. Generalized weakness - Full strength on neuro exam. Very unlikely that patient had stroke. CT head unremarkable. No signs or symptoms of stroke. His troponin was 0.2, but this appears to be his baseline. His UA was negative. CXR - Poor inspiratory effort with bibasilar mild interstitial prominence but no definite acute disease. 
- will continue to observe 
- repeat troponin 6 hours after first read 
- daily CBC, BMP 
- consulted nutrition Parkinson's - Patient oriented to person, place, but not time. Was not aware of month or year. No resting tremor on exam 
- continue home sinemet  mg TID Depression - stable. With history of dementia. - continue home sertraline 50 mg daily Possible new edema in BLE w/ hx of HFpEF - unsure if this is new swelling as patient was alone and poor historian. Last echo on 1/9/2018 - Left ventricle: Ejection fraction was estimated to be 50 %. Wall thickness was mildly increased. - f/u new echo Hypertension - BP on admission 178/110. At this time 170/105. 
- Continuing home medications of furosemide 20 mg daily, labetalol 300 mg BID, losartan 50 mg daily. - Will continue to monitor at this time and readjust as BP's trend. Diabetes Mellitus T2:  Diet controlled. Not on home meds. - Last HgA1c on 7/2014 was 6.8.  
- f/u A1C 
- POC glucose checks ACHS History of DVT 
- Continue home apixaban 2.5 mg BID Hyperlipidemia: Last lipid panel on 1/9/2018 and values were Chol 229 TG 51 .8 HDL 86 
- Continue home simvastatin 40 mg daily 
  
 
FEN/GI - No IVF at this time. Diabetic diet with sodium precautions. Activity - Ambulate with assistance DVT prophylaxis - anticoagulated with home apixaban GI prophylaxis -  Not indicated Disposition - TBD. Consult to PT/OT. CODE STATUS:  Full Patient will be discussed with Dr. Traci Mera. Tylor Barba MD 
Family Medicine Resident Hospital Problems Hospital Problems  Date Reviewed: 1/9/2018 Codes Class Noted POA Weakness generalized ICD-10-CM: R53.1 ICD-9-CM: 780.79  7/13/2018 Unknown Generalized weakness ICD-10-CM: R53.1 ICD-9-CM: 780.79  7/13/2018 Unknown

## 2018-07-13 NOTE — ED NOTES
The patient is very weak and immobile and states he rarely is able to stand at home. Dr. Calos Whitaker notified.

## 2018-07-13 NOTE — ED TRIAGE NOTES
Arrives via EMS with complaints of generalized weakness and fatigue, patient recently diagnosed with UTI, unsure if he is being treated.

## 2018-07-13 NOTE — PROGRESS NOTES
[facility]: MED RECONCILIATION    Comments/Recommendations:    Patient unable to name medications and states his daughter Camden Montoya ([phone]) helps him with his medications.  Pharmacist left  with daughter, waiting for call back.  No Rx query to review, most recent discharge from [facility] is 1/13/18.       Thank Pamela Masters, PharmD   Contact: [phone]

## 2018-07-13 NOTE — ED PROVIDER NOTES
HPI Comments: 80 y.o. male with past medical history significant for HTN, dementia, and Parkinson's who presents from home via EMS with chief complaint of generalized weakness. Per EMS, pt's daughter called for generalized weakness. On arrival, EMS had difficulty ambulating the pt, but are unsure of the pt's baseline. Daughter told EMS the pt was dx with a UTI one week ago, unknown whether the pt is on abx. Daughter also mentioned bilateral hand and feet swelling. BS en route 152. Pt with history of dementia and Parkinson's. Pt denies any current pain. There are no other acute medical concerns at this time. Full history, physical exam, and ROS unable to be obtained due to:  Dementia. Old Chart Review: Pt was seen here 6/28 for generalized weakness and various other complaints. Pt was found to be mildly dehydrated, given fluids in the ED. Pt's UA was normal, and was discharged with PCP F/U. Social hx: Former smoker; Rare EtOH use  PCP: Maira Ellison MD    Note written by Noé Matute, as dictated by Dae Martinez MD 1:03 PM    The history is provided by the patient, the EMS personnel and medical records. The history is limited by the absence of a caregiver. No  was used. Past Medical History:   Diagnosis Date    Arthritis 5/5/2010    Diabetes mellitus type 2, diet-controlled ([address]     DVT (deep venous thrombosis) ([facility])     right LE DVT (mostly distal and non-occlusive) discovered 6/15    HTN (hypertension) 5/5/2010    hypertensive heart disease (LVH)    Hypercholesteremia 5/5/2010     in 4/09    Orthostatic hypotension     Complaints of dizziness.  (lying) to 120 (standing) in 3/18/11.  Parkinson disease ([address]     diagnosed around middle of may 2015       No past surgical history on file.       Family History:   Problem Relation Age of Onset    Heart Disease Mother     Diabetes Mother     Cancer Mother     Heart Disease Father     Asthma Father        Social History     Social History    Marital status:      Spouse name: N/A    Number of children: N/A    Years of education: N/A     Occupational History    Not on file. Social History Main Topics    Smoking status: Former Smoker     Packs/day: 0.30     Years: 5.00     Types: Cigarettes, Pipe    Smokeless tobacco: Former User     Types: Snuff    Alcohol use 0.0 oz/week     0 Standard drinks or equivalent per week      Comment: very rarely    Drug use: No    Sexual activity: Not Currently     Other Topics Concern    Not on file     Social History Narrative    Lives with daughter, Sathish Celis: Review of patient's allergies indicates no known allergies. Review of Systems   Unable to perform ROS: Dementia       There were no vitals filed for this visit. Physical Exam   Constitutional: He is oriented to person, place, and time. He appears well-developed and well-nourished. No distress. HENT:   Head: Normocephalic and atraumatic. Eyes: Pupils are equal, round, and reactive to light. Neck: Normal range of motion. Neck supple. Cardiovascular: Normal rate, regular rhythm and normal heart sounds. Exam reveals no gallop and no friction rub. No murmur heard. Pulmonary/Chest: Effort normal and breath sounds normal. No respiratory distress. He has no wheezes. Abdominal: Soft. Bowel sounds are normal. He exhibits no distension. There is no tenderness. There is no rebound and no guarding. Musculoskeletal: Normal range of motion. He exhibits edema. 3+ pitting edema bilateral lower extremities. Neurological: He is alert and oriented to person, place, and time. Skin: Skin is warm. No rash noted. He is not diaphoretic. Nursing note and vitals reviewed.      Note written by Noé Pena, as dictated by Aleksandra Jay MD 1:03 PM    [facility]      ED Course     Mr. Priyank Granados is an 72-year-old male with past medical history, review of systems, physical exam as above, presenting with complaints of fatigue and difficulty ambulating. Patient brought by EMS, with the above complaints, per the patient's daughter. Patient states he typically does not ambulate, and has no complaints, though he is awake, alert, he is sluggish to respond, and due to history of Parkinson's disease, maybe a poor historian. Physical exam arrival for elderly male in no acute distress, with a soft nontender abdomen, clear breath sounds, 3+ pitting edema in the bilateral lower extremities, noted to be afebrile, without tachycardia. Unclear the source of his complaints, consider there may be a social component as well. Plan to obtain CMP, CBC, UA, chest x-ray, EKG and cardiac enzymes. We will provide a small fluid bolus, and attempt to obtain orthostatics. Make disposition based on the patient's diagnostics and response to therapy. Procedures    ED EKG interpretation: 13:34  Rhythm: normal sinus rhythm; and regular . Rate (approx.): 81 bpm; Axis: normal; Isolated T wave inversion in V6. Note written by Noé Rueda, as dictated by Cassandra Minaya MD 1:50 PM    3:29 PM  Attempted to call the pt's daughter, no answer. CONSULT NOTE:  4:58 PM Cassnadra Minaya MD spoke with [facility], Consult for [facility]. Discussed available diagnostic tests and clinical findings. They will admit the pt.

## 2018-07-13 NOTE — ED NOTES
TRANSFER - OUT REPORT:    Verbal report given to Cleo Bradford on Rere Dobbsel  being transferred to room 524 for routine progression of care       Report consisted of patients Situation, Background, Assessment and   Recommendations(SBAR). Information from the following report(s) SBAR, Kardex, ED Summary, Intake/Output, MAR, Recent Results, Med Rec Status and Cardiac Rhythm NSR was reviewed with the receiving nurse. Lines:   Peripheral IV 07/13/18 Left Antecubital (Active)   Site Assessment Clean, dry, & intact 7/13/2018  1:44 PM   Phlebitis Assessment 0 7/13/2018  1:44 PM   Infiltration Assessment 0 7/13/2018  1:44 PM   Dressing Status Clean, dry, & intact 7/13/2018  1:44 PM   Dressing Type Transparent 7/13/2018  1:44 PM   Hub Color/Line Status Pink 7/13/2018  1:44 PM        Opportunity for questions and clarification was provided.       Patient transported with:   [employer]

## 2018-07-13 NOTE — PROGRESS NOTES
Reason for Admission:  Fatique, weakness, immobile,                RRAT Score: 23                 Resources/supports as identified by patient/family:  Daughter Sherren Laud [phone]                 Top Challenges facing patient (as identified by patient/family and CM):  Pt immobile, difficulty with balance and gait, daughter Micah Schreiber works, has adult children that can assit                     Finances/Medication cost? Medicare                    Transportation? S               Support system or lack thereof? Daughter, pt has limited support                      Living arrangements? Daughter, 2nd floor apt 15 steps,               Self-care/ADLs/Cognition? Pt is full assit          Current Advanced Directive/Advance Care Plan:  None                          Plan for utilizing home health:   TBD                      Likelihood of readmission: High                 Transition of Care Plan:  CM met with pt in ED, Pt very tired, not able to answer all of assessment questions, given permission to contact jessica Schreiber,   [employer] called Jerome Pereyra [phone] no answer left message. Care Management Interventions  PCP Verified by CM:  Yes  Palliative Care Criteria Met (RRAT>21 & CHF Dx)?: Yes  Palliative Consult Recommended?: No  Reason Palliative Care Not Recommended?: Palliative Care not utilized by provider  Mode of Transport at Discharge: S  Transition of Care Consult (CM Consult): Discharge Planning  Current Support Network: [address] [place] [address]

## 2018-07-13 NOTE — ED NOTES
The charge nurse from the 5th floor called and states they were unable to take the patient until the blood pressure was within normal parameters. Dr. Baldwin Dose contacted and orders are pending.

## 2018-07-13 NOTE — Clinical Note
Patient Class[de-identified] Observation [209] Type of Bed: Remote Telemetry [29] Reason for Observation: Weakness Admitting Diagnosis: Weakness generalized [108455] Admitting Physician: Gilberto García [[id number]] Attending Physician: Gilberto García [[id number]]

## 2018-07-14 PROBLEM — F32.A DEPRESSION: Status: ACTIVE | Noted: 2018-07-14

## 2018-07-14 PROBLEM — R53.1 GENERALIZED WEAKNESS: Status: RESOLVED | Noted: 2018-07-13 | Resolved: 2018-07-14

## 2018-07-14 LAB
ANION GAP SERPL CALC-SCNC: 9 MMOL/L (ref 5–15)
BUN SERPL-MCNC: 29 MG/DL (ref 6–20)
BUN/CREAT SERPL: 25 (ref 12–20)
CALCIUM SERPL-MCNC: 8.3 MG/DL (ref 8.5–10.1)
CHLORIDE SERPL-SCNC: 109 MMOL/L (ref 97–108)
CO2 SERPL-SCNC: 28 MMOL/L (ref 21–32)
CREAT SERPL-MCNC: 1.17 MG/DL (ref 0.7–1.3)
ERYTHROCYTE [DISTWIDTH] IN BLOOD BY AUTOMATED COUNT: 15.3 % (ref 11.5–14.5)
EST. AVERAGE GLUCOSE BLD GHB EST-MCNC: 154 MG/DL
GLUCOSE BLD STRIP.AUTO-MCNC: 105 MG/DL (ref 65–100)
GLUCOSE BLD STRIP.AUTO-MCNC: 119 MG/DL (ref 65–100)
GLUCOSE BLD STRIP.AUTO-MCNC: 141 MG/DL (ref 65–100)
GLUCOSE BLD STRIP.AUTO-MCNC: 92 MG/DL (ref 65–100)
GLUCOSE BLD STRIP.AUTO-MCNC: 94 MG/DL (ref 65–100)
GLUCOSE SERPL-MCNC: 95 MG/DL (ref 65–100)
HBA1C MFR BLD: 7 % (ref 4.2–6.3)
HCT VFR BLD AUTO: 33.3 % (ref 36.6–50.3)
HGB BLD-MCNC: 10.6 G/DL (ref 12.1–17)
MCH RBC QN AUTO: 29.5 PG (ref 26–34)
MCHC RBC AUTO-ENTMCNC: 31.8 G/DL (ref 30–36.5)
MCV RBC AUTO: 92.8 FL (ref 80–99)
NRBC # BLD: 0 K/UL (ref 0–0.01)
NRBC BLD-RTO: 0 PER 100 WBC
PLATELET # BLD AUTO: 149 K/UL (ref 150–400)
PMV BLD AUTO: 10.3 FL (ref 8.9–12.9)
POTASSIUM SERPL-SCNC: 4 MMOL/L (ref 3.5–5.1)
RBC # BLD AUTO: 3.59 M/UL (ref 4.1–5.7)
SERVICE CMNT-IMP: ABNORMAL
SERVICE CMNT-IMP: NORMAL
SERVICE CMNT-IMP: NORMAL
SODIUM SERPL-SCNC: 146 MMOL/L (ref 136–145)
TROPONIN I SERPL-MCNC: 0.17 NG/ML
WBC # BLD AUTO: 3.3 K/UL (ref 4.1–11.1)

## 2018-07-14 PROCEDURE — 97116 GAIT TRAINING THERAPY: CPT

## 2018-07-14 PROCEDURE — 74011250637 HC RX REV CODE- 250/637: Performed by: STUDENT IN AN ORGANIZED HEALTH CARE EDUCATION/TRAINING PROGRAM

## 2018-07-14 PROCEDURE — 97530 THERAPEUTIC ACTIVITIES: CPT

## 2018-07-14 PROCEDURE — 83036 HEMOGLOBIN GLYCOSYLATED A1C: CPT | Performed by: FAMILY MEDICINE

## 2018-07-14 PROCEDURE — 97162 PT EVAL MOD COMPLEX 30 MIN: CPT

## 2018-07-14 PROCEDURE — 80048 BASIC METABOLIC PNL TOTAL CA: CPT | Performed by: FAMILY MEDICINE

## 2018-07-14 PROCEDURE — 77030033269 HC SLV COMPR SCD KNE2 CARD -B

## 2018-07-14 PROCEDURE — 84484 ASSAY OF TROPONIN QUANT: CPT | Performed by: FAMILY MEDICINE

## 2018-07-14 PROCEDURE — 97165 OT EVAL LOW COMPLEX 30 MIN: CPT

## 2018-07-14 PROCEDURE — 36415 COLL VENOUS BLD VENIPUNCTURE: CPT | Performed by: FAMILY MEDICINE

## 2018-07-14 PROCEDURE — 85027 COMPLETE CBC AUTOMATED: CPT | Performed by: FAMILY MEDICINE

## 2018-07-14 PROCEDURE — 97535 SELF CARE MNGMENT TRAINING: CPT

## 2018-07-14 PROCEDURE — 65390000012 HC CONDITION CODE 44 OBSERVATION

## 2018-07-14 PROCEDURE — 65270000029 HC RM PRIVATE

## 2018-07-14 PROCEDURE — 97163 PT EVAL HIGH COMPLEX 45 MIN: CPT

## 2018-07-14 PROCEDURE — 82962 GLUCOSE BLOOD TEST: CPT

## 2018-07-14 RX ORDER — SERTRALINE HYDROCHLORIDE 100 MG/1
100 TABLET, FILM COATED ORAL DAILY
COMMUNITY

## 2018-07-14 RX ORDER — FUROSEMIDE 40 MG/1
40 TABLET ORAL DAILY
COMMUNITY

## 2018-07-14 RX ORDER — DEXTROSE 50 % IN WATER (D50W) INTRAVENOUS SYRINGE
12.5-25 AS NEEDED
Status: DISCONTINUED | OUTPATIENT
Start: 2018-07-14 | End: 2018-07-17 | Stop reason: HOSPADM

## 2018-07-14 RX ORDER — HYDRALAZINE HYDROCHLORIDE 20 MG/ML
20 INJECTION INTRAMUSCULAR; INTRAVENOUS ONCE
Status: COMPLETED | OUTPATIENT
Start: 2018-07-15 | End: 2018-07-15

## 2018-07-14 RX ORDER — MAGNESIUM SULFATE 100 %
4 CRYSTALS MISCELLANEOUS AS NEEDED
Status: DISCONTINUED | OUTPATIENT
Start: 2018-07-14 | End: 2018-07-17 | Stop reason: HOSPADM

## 2018-07-14 RX ORDER — INSULIN LISPRO 100 [IU]/ML
INJECTION, SOLUTION INTRAVENOUS; SUBCUTANEOUS
Status: DISCONTINUED | OUTPATIENT
Start: 2018-07-14 | End: 2018-07-17 | Stop reason: HOSPADM

## 2018-07-14 RX ORDER — ATORVASTATIN CALCIUM 20 MG/1
20 TABLET, FILM COATED ORAL DAILY
COMMUNITY

## 2018-07-14 RX ADMIN — LOSARTAN POTASSIUM 50 MG: 50 TABLET ORAL at 08:54

## 2018-07-14 RX ADMIN — APIXABAN 2.5 MG: 2.5 TABLET, FILM COATED ORAL at 11:17

## 2018-07-14 RX ADMIN — FUROSEMIDE 20 MG: 40 TABLET ORAL at 08:54

## 2018-07-14 RX ADMIN — LABETALOL HCL 300 MG: 100 TABLET, FILM COATED ORAL at 20:28

## 2018-07-14 RX ADMIN — CARBIDOPA AND LEVODOPA 1 TABLET: 25; 250 TABLET ORAL at 08:54

## 2018-07-14 RX ADMIN — LABETALOL HCL 100 MG: 100 TABLET, FILM COATED ORAL at 08:54

## 2018-07-14 RX ADMIN — APIXABAN 2.5 MG: 2.5 TABLET, FILM COATED ORAL at 18:10

## 2018-07-14 RX ADMIN — SIMVASTATIN 40 MG: 20 TABLET, FILM COATED ORAL at 21:49

## 2018-07-14 RX ADMIN — LABETALOL HCL 200 MG: 100 TABLET, FILM COATED ORAL at 08:53

## 2018-07-14 RX ADMIN — CARBIDOPA AND LEVODOPA 1 TABLET: 25; 250 TABLET ORAL at 21:49

## 2018-07-14 RX ADMIN — Medication 10 ML: at 21:50

## 2018-07-14 RX ADMIN — CARBIDOPA AND LEVODOPA 1 TABLET: 25; 250 TABLET ORAL at 16:28

## 2018-07-14 RX ADMIN — Medication 10 ML: at 05:19

## 2018-07-14 RX ADMIN — SERTRALINE HYDROCHLORIDE 50 MG: 50 TABLET ORAL at 09:00

## 2018-07-14 NOTE — PROGRESS NOTES
Samir Garcia [address] Office [phone] Fax [phone] Assessment and Plan Juan Blanco is a 80 y.o. male w/ history of HTN, dementia, Parkinson's, CYNTHIA, T2DM (diet controlled), HFpEF, DVT, HLD, and depression who was admitted for generalized weakness. 24 Hour Events: No acute events. Attempted calling Pt's daughter two times this morning. Still no response. Generalized weakness - Full strength on neuro exam. Very unlikely that patient had stroke. CT head unremarkable. No signs or symptoms of stroke. His troponin was 0.2, but this appears to be his baseline. His UA was negative. CXR - Poor inspiratory effort with bibasilar mild interstitial prominence but no definite acute disease. Repeat troponin was 0.17. 
- will continue to observe 
- daily CBC, BMP 
- consulted nutrition 
  
Parkinson's - Patient oriented to person, place, but not time. Was not aware of month or year. No resting tremor on exam 
- continue home sinemet  mg TID 
  
Depression - stable. With history of dementia. - continue home sertraline 50 mg daily 
  
Possible new edema in BLE w/ hx of HFpEF - unsure if this is new swelling as patient was alone and poor historian. Last echo on 1/9/2018 - Left ventricle: Ejection fraction was estimated to be 50 %. Wall thickness was mildly increased. - ECHO to be done today 
  
Hypertension - BP on admission 178/110. At this time 170/105. 
- Continuing home medications of furosemide 20 mg daily, labetalol 300 mg BID, losartan 50 mg daily. - Will continue to monitor at this time and readjust as BP's trend. 
  
Diabetes Mellitus T2:  Diet controlled. Not on home meds. - Last HgA1c on 7/2014 was 6.8.  
- f/u A1C 
- POC glucose checks ACHS 
  
History of DVT 
- Continue home apixaban 2.5 mg BID 
  
Hyperlipidemia: Last lipid panel on 1/9/2018 and values were Chol 229 TG 51 .8 HDL 86 
- Continue home simvastatin 40 mg daily    
  
FEN/GI - No IVF at this time. Diabetic diet with sodium precautions. Activity - Ambulate with assistance DVT prophylaxis - anticoagulated with home apixaban GI prophylaxis -  Not indicated Disposition - TBD. Consult to PT/OT. Will reach out to case management today. 
  
CODE STATUS:  Full Pt will be discussed with Dr. Yanick De La Cruz. I appreciate the opportunity to participate in the care of this patient, Aneta Corey MD 
Family Medicine Resident Subjective / Objective Subjective Mr. Christa Hollingsworth states that he is feeling better today regarding his weakness. Reports that his legs are still swollen. Denies CP, SOB, abdominal pain. Temp (24hrs), Av.9 °F (36.6 °C), Min:97.7 °F (36.5 °C), Max:98.2 °F (36.8 °C) Objective Respiratory:   O2 Device: Room air Visit Vitals  /88 (BP 1 Location: Right arm, BP Patient Position: At rest)  Pulse 73  Temp 97.9 °F (36.6 °C)  Resp 16  Wt 180 lb (81.6 kg)  SpO2 100%  BMI 29.05 kg/m2 General: No acute distress. Alert. Cooperative. Head: Normocephalic. Atraumatic. Eyes:              Conjunctiva pink. Sclera white. PERRL. Neck: Supple. Normal ROM. No stiffness. Respiratory: CTAB. No w/r/r/c.  
Cardiovascular: RRR. Normal S1,S2. No m/r/g. Pulses 2+ throughout. GI: + bowel sounds. Nontender. No rebound tenderness or guarding. Nondistended. Musculoskeletal: Full ROM in all extremities. 2+ pitting LE edema. Distal pulses intact. Skin: Warm, dry. No rashes. Neuro: CN II-XII grossly intact. Strength 5/5 in all extremities. Oriented to person, place, but not time. I/O: 
 
Date 18 - 18 0985 18 - 07/15/18 2794 Shift [id number] 24 Hour Total  24 Hour Total  
I 
N 
T 
A 
K 
E 
 P.O.  240 240     
   P. O.  240 240 Shift Total 
(mL/kg)  240 
(2.9) 240 
(2.9) O 
U T 
P 
U [place] Stacks Urine (mL/kg/hr) Urine Occurrence(s)  1 x 1 x Stool Stool Occurrence(s)  0 x 0 x Shift Total 
(mL/kg) NET  240 240 Weight (kg) 81.6 81.6 81.6 81.6 81.6 81.6  
 
 
CBC: 
Recent Labs  
   07/14/18 
 0356  07/13/18 
 1328 WBC  3.3*  3.3* HGB  10.6*  12.4 HCT  33.3*  39.2 PLT  149*  167 Metabolic Panel: 
Recent Labs  
   07/14/18 0356 07/13/18 
 1328 NA  146*  145  
K  4.0  4.2 CL  109*  107 CO2  28  32 BUN  29*  31* CREA  1.17  1.31* GLU  95  123* CA  8.3*  9.0 ALB   --   3.4* SGOT   --   55* ALT   --   42 For Billing Chief Complaint Patient presents with  Fatigue Hospital Problems  Date Reviewed: 1/9/2018 Codes Class Noted POA Weakness generalized ICD-10-CM: R53.1 ICD-9-CM: 780.79  7/13/2018 Unknown Generalized weakness ICD-10-CM: R53.1 ICD-9-CM: 780.79  7/13/2018 Unknown

## 2018-07-14 NOTE — PROGRESS NOTES
Problem: Falls - Risk of  Goal: *Absence of Falls  Document Sukhwinder Fall Risk and appropriate interventions in the flowsheet. Outcome: Progressing Towards Goal  Fall Risk Interventions:  Mobility Interventions: Bed/chair exit alarm, Communicate number of staff needed for ambulation/transfer    Mentation Interventions: Bed/chair exit alarm, More frequent rounding, Reorient patient, Update white board    Medication Interventions: Bed/chair exit alarm, Patient to call before getting OOB, Teach patient to arise slowly    Elimination Interventions: Bed/chair exit alarm, Call light in reach, Patient to call for help with toileting needs             Problem: Pressure Injury - Risk of  Goal: *Prevention of pressure injury  Document Song Scale and appropriate interventions in the flowsheet.    Outcome: Progressing Towards Goal  Pressure Injury Interventions:  Sensory Interventions: Assess changes in LOC, Assess need for specialty bed, Keep linens dry and wrinkle-free, Maintain/enhance activity level, Minimize linen layers, Monitor skin under medical devices, Pad between skin to skin, Pressure redistribution bed/mattress (bed type)    Moisture Interventions: Absorbent underpads, Apply protective barrier, creams and emollients, Assess need for specialty bed, Check for incontinence Q2 hours and as needed, Internal/External urinary devices, Maintain skin hydration (lotion/cream), Moisture barrier    Activity Interventions: Assess need for specialty bed, Pressure redistribution bed/mattress(bed type)    Mobility Interventions: Float heels, HOB 30 degrees or less, Pressure redistribution bed/mattress (bed type)    Nutrition Interventions: Document food/fluid/supplement intake, Discuss nutritional consult with provider, Offer support with meals,snacks and hydration    Friction and Shear Interventions: HOB 30 degrees or less, Lift team/patient mobility team

## 2018-07-14 NOTE — PROGRESS NOTES
[facility] : MED RECONCILIATION     Grand daughter Marcelina Díaz called back  She had medication bottles with her  Confirmed current list  Changed Lasix to 40 mg daily, Sertraline to 100 mg daily, Simvastatin to Atorvastatin. Prior to Admission Medications   Prescriptions Last Dose Informant Patient Reported? Taking? apixaban (ELIQUIS) 2.5 mg tablet  Family Member No Yes   Sig: TAKE ONE TABLET BY MOUTH TWICE DAILY   atorvastatin (LIPITOR) 20 mg tablet  Family Member Yes Yes   Sig: Take 20 mg by mouth daily. carbidopa-levodopa (SINEMET)  mg per tablet  Family Member No Yes   Sig: TAKE ONE TABLET BY MOUTH THREE TIMES DAILY   furosemide (LASIX) 40 mg tablet  Family Member Yes Yes   Sig: Take 40 mg by mouth daily. labetalol (NORMODYNE) 300 mg tablet  Family Member No Yes   Sig: TAKE ONE TABLET BY MOUTH TWICE DAILY   losartan (COZAAR) 50 mg tablet  Family Member No Yes   Sig: Take 1 Tab by mouth daily. sertraline (ZOLOFT) 100 mg tablet  Family Member Yes Yes   Sig: Take 100 mg by mouth daily. therapeutic multivitamin [facility]) tablet  Family Member Yes Yes   Sig: Take 1 Tab by mouth daily.            Thank you,  Solitario Dickinson, PharmD

## 2018-07-14 NOTE — PROGRESS NOTES
Bedside and Verbal shift change report given to RAMANDEEP Herrera(oncoming nurse) by Wellington Dakin (offgoing nurse). Report included the following information SBAR and Kardex.

## 2018-07-14 NOTE — PROGRESS NOTES
Samir Garcia [address] Office [phone] Fax [phone] Assessment and Plan Delia Fuentes is a 80 y.o. male w/ history of HTN, dementia, Parkinson's, CYNTHIA, T2DM (diet controlled), HFpEF, DVT, HLD, and depression who was admitted for generalized weakness. 24 Hour Events:  
- Several attempts were made to reach daughter with no response. CM was consulted but was unable to reach after initial discussion.  
- Per chart review pharmacy was able to discuss medications with granddaughter. Spoke w pharmacy and got a phone number for Cara Orozco (daughter) not listed in chart, [phone]. Went to [facility]. Will try again later. 
- Received IV hyrdalazine 20mg x 1 overnight for elevated BPs Generalized weakness POA - normal Neuro exam. Head CT neg. His UA was negative. CXR - Poor inspiratory effort with bibasilar mild interstitial prominence but no definite acute disease.  
- Nutrition following 
- PT/OT recommending SNF for rehab - CM consulted 7/14 Possible new edema in BLE w/ hx of HFpEF - unsure if this is new swelling as patient was alone and poor historian. Last echo on 1/9/2018 - Left ventricle: Ejection fraction was estimated to be 50 %. Wall thickness was mildly increased. Admission BNP elevated to 12,000 (previously 3,000 in 1/2018) - Continue home lasix 20mg daily 
- ECHO today Chronically elevated troponin - POA Trop 0.20 --> 0.17. Both at baseline.  
  
Parkinson's Dementia  - Patient oriented to person, place, but not time. Was not aware of month or year. No resting tremor on exam. Unclear what patient's baseline is as no family around to corroborate. - continue home sinemet  mg TID 
 
CKD Stage 2 (baseline Cr 1.2-1.3) - Cr 1.20 today, at baseline.  
  
Depression - stable.    
- continue home sertraline 50 mg daily 
  
Hypertension - Elevated on admission to 164Y-248M systolic but now improved, stable in 705Y-891G systolic.  
- Home lasix 20 mg daily, labetalol 300 mg BID, losartan 50 mg daily. - Will continue to monitor at this time and readjust as BP's trend. Consider PRN hydralazine.  
  
Diabetes Mellitus T2:  Diet controlled. No home meds. - Last HgA1c on 2014 was 6.8, repeat A1c on admission 7.0.  
- POC glucose checks ACHS with SSI, normal sensitivity - Has not required any lispro this admission.  
  
History of DVT - Continue home eliquis 2.5 mg BID 
  
Hyperlipidemia: Last lipid panel on 2018 and values were Chol 229 TG 51 .8 HDL 86 
- Continue home simvastatin 40 mg daily 
   
FEN/GI -  Diabetic diet with sodium precautions. Activity - Ambulate with assistance DVT prophylaxis - anticoagulated with home apixaban GI prophylaxis -  Not indicated Disposition - SNF, CM consulted  
  
CODE STATUS:  Full Pt will be discussed with Dr. Brian Elise. I appreciate the opportunity to participate in the care of this patient, Aggie Ma MD 
Family Medicine Resident Subjective / Objective Subjective Patient doing well, denies any pain, SOB, dizziness, weakness. Is c/o chronic cough. Temp (24hrs), Av.3 °F (36.8 °C), Min:97.2 °F (36.2 °C), Max:98.9 °F (37.2 °C) Objective: 
Vital signs: (most recent): Blood pressure 124/89, pulse 80, temperature 98.6 °F (37 °C), resp. rate 16, weight 180 lb (81.6 kg), SpO2 100 %. Respiratory:   O2 Device: Room air Visit Vitals  /87 (BP 1 Location: Right arm, BP Patient Position: At rest)  Pulse 79  Temp 98.9 °F (37.2 °C)  Resp 16  Wt 182 lb 15.7 oz (83 kg)  SpO2 95%  BMI 29.53 kg/m2 General: No acute distress. Alert. Cooperative. Head: Normocephalic. Atraumatic. Eyes:              Conjunctiva pink. Sclera white. PERRL. Neck: Supple. Normal ROM. No stiffness. Respiratory: CTAB. No w/r/r/c.  
Cardiovascular: RRR. Normal S1,S2. No m/r/g. Pulses 2+ throughout. GI: + bowel sounds. Nontender. No rebound tenderness or guarding. Nondistended. Musculoskeletal: Full ROM in all extremities. 2+ pitting LE edema. Distal pulses intact. Skin: Warm, dry. No rashes. Neuro: CN II-XII grossly intact. Strength 5/5 in all extremities. Oriented to person, place, but not time. I/O: 
 
Date 07/14/18 0700 - 07/15/18 7990 07/15/18 0700 - 07/16/18 8766 Shift 0700-1859 1900-0659 24 Hour Total 0700-1859 1900-0659 24 Hour Total  
I 
N 
T 
A 
K 
E 
 P.O. 120 300 420     
   P. O. 120 300 420 Shift Total 
(mL/kg) 120 
(1.5) 300 
(3.6) 420 
(5.1) O 
U T 
P 
U Sarah Home Urine (mL/kg/hr) Urine Occurrence(s) 1 x 3 x 4 x Stool Stool Occurrence(s) 1 x 1 x 2 x Shift Total 
(mL/kg)  300 420 Weight (kg) 81.6 83 83 83 83 83 CBC: 
Recent Labs  
   07/15/18 
 0424  07/14/18 
 0356  07/13/18 
 1328 WBC  4.9  3.3*  3.3* HGB  11.9*  10.6*  12.4 HCT  36.9  33.3*  39.2 PLT  178  149*  167 Metabolic Panel: 
Recent Labs  
   07/15/18 
 0424  07/14/18 
 0356  07/13/18 
 1328 NA  143  146*  145  
K  3.8  4.0  4.2 CL  106  109*  107 CO2  30  28  32 BUN  24*  29*  31* CREA  1.20  1.17  1.31* GLU  100  95  123* CA  8.6  8.3*  9.0 ALB   --    --   3.4* SGOT   --    --   55* ALT   --    --   42 For Billing Chief Complaint Patient presents with  Fatigue Hospital Problems  Date Reviewed: 1/9/2018 Codes Class Noted POA Depression ICD-10-CM: F32.9 ICD-9-CM: 976  7/14/2018 Unknown * (Principal)Weakness generalized ICD-10-CM: R53.1 ICD-9-CM: 780.79  7/13/2018 Unknown History of DVT (deep vein thrombosis) ICD-10-CM: V56.060 ICD-9-CM: V12.51  4/11/2017 Yes Diastolic dysfunction with chronic heart failure ([facility] ICD-10-CM: I50.32 
ICD-9-CM: 428.32  12/21/2015 Yes Parkinsons disease ([facility] ICD-10-CM: G20 
ICD-9-CM: 332.0  5/26/2015 Yes Diabetes mellitus type II, controlled ([facility] ICD-10-CM: E11.9 ICD-9-CM: 250.00 1/26/2015 Yes Hyperlipidemia ICD-10-CM: E78.5 ICD-9-CM: 272.4  8/16/2012 Yes Overview Signed 8/16/2012  1:36 PM by Roxi Rosales MD  
  LDL goal < 70 HTN (hypertension) ICD-10-CM: I10 
ICD-9-CM: 401.9  3/19/2011 Yes Overview Signed 5/23/2011  8:08 AM by Cong Ingram MD  
  ECHO 5/2011 = LVH, EF 50%

## 2018-07-14 NOTE — PROGRESS NOTES
Problem: Self Care Deficits Care Plan (Adult)  Goal: *Acute Goals and Plan of Care (Insert Text)  Occupational Therapy Goals  Initiated 7/14/2018  1. Patient will perform self-feeding with minimal assistance/contact guard assist within 7 day(s). 2.  Patient will perform grooming with minimal assistance/contact guard assist within 7 day(s). 3.  Patient will perform upper body dressing with minimal assistance/contact guard assist within 7 day(s). 4.  Patient will perform toilet transfers with moderate assistance  within 7 day(s). 5.  Patient will perform all aspects of toileting with maximal assistance within 7 day(s). 6.  Patient will participate in upper extremity therapeutic exercise/activities with supervision/set-up for 5 minutes within 7 day(s). 7.  Patient will utilize energy conservation techniques during functional activities with verbal cues within 7 day(s). Occupational Therapy EVALUATION  Patient: Paresh Hill (82 y.o. male)  Date: 7/14/2018  Primary Diagnosis: Weakness generalized  Generalized weakness        Precautions:  Fall    ASSESSMENT :  Based on the objective data described below, the patient presents with generalized weakness and B LE swelling and with significant PMH for PD / dementia. Patient with 50% ejection fraction. Upon presentation, patient with flat affect and with delay to respond to directions. Patient lives with dtr in assisted living apartment ([address] [place] [address]). His daughter assists with ADLs at baseline and all IADLs. Patient reports he has a tub shower combo with shower chair and grab bars. He reports he has a walker and cane. While his daughter works, he states \"I do the best I can. \". Patient now presents with global weakness and requires max A for functional transfers and up to total A for ADLs (including feeding and toileting) and bed mobility as evidenced by 5/100 on the Barthel Index.  His occupational performance deficits include: ataxia, poor motor planning and initiation, decreased B UE and LE strength, decreased coordination, decreased core stability, decreased endurance all of which greatly impact participation in daily roles, habits, and routines. He is disoriented to place and situation and would benefit from OT to address deficits to maximize independence in daily roles, habits, and routines. Expect slow progress towards goals with recommendation for SNF following discharge. Patient will benefit from skilled intervention to address the above impairments. Patients rehabilitation potential is considered to be Good  Factors which may influence rehabilitation potential include:   []             None noted  [x]             Mental ability/status  [x]             Medical condition  [x]             Home/family situation and support systems  [x]             Safety awareness  []             Pain tolerance/management  []             Other:      PLAN :  Recommendations and Planned Interventions:  [x]               Self Care Training                  [x]        Therapeutic Activities  [x]               Functional Mobility Training    []        Cognitive Retraining  [x]               Therapeutic Exercises           [x]        Endurance Activities  [x]               Balance Training                   [x]        Neuromuscular Re-Education  []               Visual/Perceptual Training     [x]   Home Safety Training  [x]               Patient Education                 [x]        Family Training/Education  []               Other (comment):    Frequency/Duration: Patient will be followed by occupational therapy 5 times a week to address goals. Discharge Recommendations: Skilled Nursing Facility  Further Equipment Recommendations for Discharge: TBD     SUBJECTIVE:   Patient stated I do what I can.     OBJECTIVE DATA SUMMARY:   HISTORY:   Past Medical History:   Diagnosis Date    Arthritis 5/5/2010    Diabetes mellitus type 2, diet-controlled [facility]     DVT (deep venous thrombosis) ([facility]     right LE DVT (mostly distal and non-occlusive) discovered 6/15    HTN (hypertension) 5/5/2010    hypertensive heart disease ([facility])    Hypercholesteremia 5/5/2010     in 4/09    Orthostatic hypotension     Complaints of dizziness.  (lying) to 120 (standing) in 3/18/11.  Parkinson disease ([facility]     diagnosed around middle of may 2015   No past surgical history on file. Prior Level of Function/Environment/Context: required assist for ADLs from dtr per report; total A for IADLs  Expanded or extensive additional review of patient history:     Home Situation  Home Environment: Other (comment) ([place] )  One/Two Story Residence: One story  Living Alone: No  Support Systems: Child(hector), Other (comments) (dtr lives w him)  Patient Expects to be Discharged to[de-identified] Apartment  Current DME Used/Available at Home: Cane, straight, Walker  Tub or Shower Type: Tub/Shower combination      EXAMINATION OF PERFORMANCE DEFICITS:  Cognitive/Behavioral Status:  Neurologic State: Lethargic  Orientation Level: Oriented to person;Disoriented to time;Disoriented to place; Disoriented to situation  Cognition: Impaired decision making; Follows commands;Poor safety awareness  Perception: Appears intact  Perseveration: No perseveration noted  Safety/Judgement: Lack of insight into deficits; Decreased insight into deficits; Decreased awareness of need for safety;Decreased awareness of need for assistance;Decreased awareness of environment    Skin: intact in the uppers    Edema: none noted in the uppers; B LE edema; positioning after session with pillows to raise LE's;     Hearing:   Auditory  Auditory Impairment: None    Vision/Perceptual:                           Acuity: Able to read clock/calendar on wall without difficulty         Range of Motion:  AROM: Grossly decreased, non-functional  PROM: Generally decreased, functional Strength:  Strength: Generally decreased, functional; able to support self in standing with hands on walker                Coordination:  Coordination: Grossly decreased, non-functional  Fine Motor Skills-Upper: Left Impaired;Right Impaired    Gross Motor Skills-Upper: Left Impaired;Right Impaired    Tone & Sensation:    Tone: Abnormal  Sensation: Intact                      Balance:  Sitting: Impaired;High guard; With support  Sitting - Static: Prop sitting; Fair (occasional)  Sitting - Dynamic: Poor (constant support)  Standing: Impaired; With support    Functional Mobility and Transfers for ADLs:  Bed Mobility:  Rolling: Total assistance  Supine to Sit: Total assistance  Sit to Supine: Total assistance  Scooting: Total assistance    Transfers:  Sit to Stand: Maximum assistance  Stand to Sit: Maximum assistance    ADL Assessment:  Patient received supine in bed with HOB and feet elevated; patient agreeable verbally to OT pierre. Patient was soiled at start of session and rehab tech and nurse tech notified for assist.  Patient required significantly increased time to verbally or physically respond, but able to follow commands with increased time. Required total A for bed mobility and max A for sit to stand with RW to complete toileting at total A level. Total A for UE and LE dressing this date. Patient returned to supine with HOB elevated and left with tray table in place with RN tech present to assist with eating lunch. Feeding: Total assistance    Oral Facial Hygiene/Grooming: Maximum assistance    Bathing: Total assistance    Upper Body Dressing: Total assistance    Lower Body Dressing: Total assistance    Toileting: Total assistance  Meal Preparation: Total assistance  Homemaking:  Total assistance  Medication Management: Total assistance  Financial Management: Total assistance    ADL Intervention and task modifications:  Feeding  Feeding Assistance: Maximum assistance  Container Management: Maximum assistance  Food to Mouth: Maximum assistance  Drink to Mouth: Maximum assistance                   Upper Body [address]: Maximum assistance         Toileting  Toileting Assistance: Total assistance(dependent)  Bladder Hygiene: Total assistance (dependent)  Bowel Hygiene: Total assistance (dependent)  Clothing Management: Total assistance (dependent)  Cues: Physical assistance for pants down;Physical assistance for pants up;Verbal cues provided  Adaptive Equipment: Walker    Cognitive Retraining  Following Commands: Follows one step commands/directions  Safety/Judgement: Lack of insight into deficits; Decreased insight into deficits; Decreased awareness of need for safety;Decreased awareness of need for assistance;Decreased awareness of environment  Cues: Verbal cues provided    Functional Measure:  Barthel Index:    Bathin  Bladder: 0  Bowels: 0  Groomin  Dressin  Feedin  Mobility: 0  Stairs: 0  Toilet Use: 0  Transfer (Bed to Chair and Back): 5  Total: 5       Barthel and G-code impairment scale:  Percentage of impairment CH  0% CI  1-19% CJ  20-39% CK  40-59% CL  60-79% CM  80-99% CN  100%   Barthel Score 0-100 100 99-80 79-60 59-40 20-39 1-19   0   Barthel Score 0-20 20 17-19 13-16 9-12 5-8 1-4 0      The Barthel ADL Index: Guidelines  1. The index should be used as a record of what a patient does, not as a record of what a patient could do. 2. The main aim is to establish degree of independence from any help, physical or verbal, however minor and for whatever reason. 3. The need for supervision renders the patient not independent. 4. A patient's performance should be established using the best available evidence. Asking the patient, friends/relatives and nurses are the usual sources, but direct observation and common sense are also important. However direct testing is not needed.   5. Usually the patient's performance over the preceding 24-48 hours is important, but occasionally longer periods will be relevant. 6. Middle categories imply that the patient supplies over 50 per cent of the effort. 7. Use of aids to be independent is allowed. Zoila Alaniz., Barthel, D.W. (2634). Functional evaluation: the Barthel Index. [facility] (14)2. Manuel OdomAWAIS Nolan, Mika Riddle., Adalberto Batista., [place], [address] [place] [address] (1999). Measuring the change indisability after inpatient rehabilitation; comparison of the responsiveness of the Barthel Index and Functional [place] Measure. Journal of Neurology, Neurosurgery, and Psychiatry, 66(4), 149-404. Nettie Bosch, N.J.A, STEPH Acevedo, & Steve Diaz, M.A. (2004.) Assessment of post-stroke quality of life in cost-effectiveness studies: The usefulness of the Barthel Index and the EuroQoL-5D. Quality of Life Research, 13, 272-45       G codes: In compliance with CMSs Claims Based Outcome Reporting, the following G-code set was chosen for this patient based on their primary functional limitation being treated: The outcome measure chosen to determine the severity of the functional limitation was the Barthel Index with a score of 5/100 which was correlated with the impairment scale. ?  Self Care:     - CURRENT STATUS: CM - 80%-99% impaired, limited or restricted    - GOAL STATUS: CL - 60%-79% impaired, limited or restricted    - D/C STATUS:  ---------------To be determined---------------     Occupational Therapy Evaluation Charge Determination   History Examination Decision-Making   LOW Complexity : Brief history review  LOW Complexity : 1-3 performance deficits relating to physical, cognitive , or psychosocial skils that result in activity limitations and / or participation restrictions  LOW Complexity : No comorbidities that affect functional and no verbal or physical assistance needed to complete eval tasks       Based on the above components, the patient evaluation is determined to be of the following complexity level: LOW   Pain:  Pain Scale 1: Numeric (0 - 10)  Pain Intensity 1: 0              Activity Tolerance:   Good  Please refer to the flowsheet for vital signs taken during this treatment. After treatment:   [] Patient left in no apparent distress sitting up in chair  [x] Patient left in no apparent distress in bed  [x] Call bell left within reach  [x] Nursing notified  [] Caregiver present  [] Bed alarm activated    COMMUNICATION/EDUCATION:   The patients plan of care was discussed with: Physical Therapist, Rehabilitation Attendant and Certified Nursing Assistant/Patient Care Technician. [x] Home safety education was provided and the patient/caregiver indicated understanding. [x] Patient/family have participated as able in goal setting and plan of care. [x] Patient/family agree to work toward stated goals and plan of care. [] Patient understands intent and goals of therapy, but is neutral about his/her participation. [] Patient is unable to participate in goal setting and plan of care. This patients plan of care is appropriate for delegation to [facility].     Thank you for this referral.  Ryan Gurrola OTR/BRENNAN  Time Calculation: 37 mins

## 2018-07-14 NOTE — PROGRESS NOTES
Occupational Therapy Note:     Orders acknowledged; chart reviewed; spoke with nursing who stated patient is currently undergoing ECHO. Appropriate for therapy after. Will continue to follow for OT evaluation.      Dylan Torres OTR/L

## 2018-07-14 NOTE — ROUTINE PROCESS
Bedside shift change report given to Joyce (oncoming nurse) by Isaac Gillespie (offgoing nurse). Report included the following information SBAR, Kardex, MAR and Recent Results.

## 2018-07-14 NOTE — CDMP QUERY
There is noted documentation of HFpEFon this record. Could this be further clarified as to the acuity    =>Acute Diastolic CHF POA in the setting of elevated BNP requiring continued lasix  Or  =>Chronic  Diastolic CHF POA in the setting of elevated BNP requiring continued lasix  =>Other Explanation of clinical findings  =>Clinically Undetermined (no explanation for clinical findings)    The medical record reflects the following clinical findings, treatment, and risk factors:    Risk Factors: HX of diastolic CHF  Clinical Indicators: NT pro-BNP: 43315 on admission , weakness,    2+ pitting edema in BLE. Treatment: continued po lasix      Please clarify and document your clinical opinion in the progress notes and discharge summary including the definitive and/or presumptive diagnosis, (suspected or probable), related to the above clinical findings.  Please include clinical findings supporting your diagnosis    Thank you,         Magalys Wayne [facility]

## 2018-07-14 NOTE — PROGRESS NOTES
Problem: Mobility Impaired (Adult and Pediatric)  Goal: *Acute Goals and Plan of Care (Insert Text)  Physical Therapy Goals  Initiated 7/14/2018  1. Patient will move from supine to sit and sit to supine  in bed with minimal assistance/contact guard assist within 7 day(s). 2.  Patient will transfer from bed to chair and chair to bed with minimal assistance/contact guard assist using the least restrictive device within 7 day(s). 3.  Patient will perform sit to stand with minimal assistance/contact guard assist within 7 day(s). 4.  Patient will ambulate with minimal assistance/contact guard assist for 100 feet with the least restrictive device within 7 day(s). physical Therapy EVALUATION  Patient: Renita Ovalles (40 y.o. male)  Date: 7/14/2018  Primary Diagnosis: Weakness generalized  Generalized weakness        Precautions: Parkinson's Dz; Fall    ASSESSMENT :  Based on the objective data described below, the patient presents with admission due to weakness/CHF. Pt received supine in bed with noted Parkinsonian rigidity. Difficulty with movement and speech. Last Sinomet 7hrs ago thus rigidity at its peak. Max to Total Ax2 with supine to sit. Good sitting balance on EOB although high guard with tendency to lean backwards. Sit to stand with Mod Ax2. Very NBOS and tendency to fall posteriorly. High fall risk only scoring 3/56 on Villarreal Balance Test.  Pt Mod Ax2 for 6' of gait with RW forward, retro and side. Noted festinating gait and difficulty with LE progression. Returned to supine with Total Ax2. BLE elevated on multiple pillows due to significant edema. Call bell modified to improve pt dexterity. Demonstrated good use. Will benefit from continued PT to progress functional mobility. SNF recommended for rehab. Patient will benefit from skilled intervention to address the above impairments.   Patients rehabilitation potential is considered to be Good  Factors which may influence rehabilitation potential include:   []         None noted  []         Mental ability/status  [x]         Medical condition  [x]         Home/family situation and support systems  []         Safety awareness  []         Pain tolerance/management  []         Other:      PLAN :  Recommendations and Planned Interventions:  [x]           Bed Mobility Training             [x]    Neuromuscular Re-Education  [x]           Transfer Training                   []    Orthotic/Prosthetic Training  [x]           Gait Training                         []    Modalities  [x]           Therapeutic Exercises           []    Edema Management/Control  [x]           Therapeutic Activities            [x]    Patient and Family Training/Education  []           Other (comment):    Frequency/Duration: Patient will be followed by physical therapy  5 times a week to address goals. Discharge Recommendations: Rehab at [address] [place], [place] [address]  Recommendations for Discharge: tbd     SUBJECTIVE:   Patient stated I live with my dtr.     OBJECTIVE DATA SUMMARY:   HISTORY:    Past Medical History:   Diagnosis Date    Arthritis 5/5/2010    [facility] type 2[facility]     DVT (deep venous thrombosis) ([facility]     right LE DVT (mostly distal and non-occlusive) discovered 6/15    HTN (hypertension) 5/5/2010    hypertensive heart disease (LVH)    Hypercholesteremia 5/5/2010     in 4/09    Orthostatic hypotension     Complaints of dizziness.  (lying) to 120 (standing) in 3/18/11.  Parkinson disease ([facility]     diagnosed around middle of may 2015   No past surgical history on file.   Prior Level of Function/Home Situation: lives with dtr; not sure of level of care PTA  Personal factors and/or comorbidities impacting plan of care: weak; CHF; Parkinson's; fall; DM    Home Situation  Home Environment: Other (comment) ([place] )  One/Two Story Residence: One story  Living Alone: No  Support Systems: Child(hector), Other (comments) (dtr lives w him)  Patient Expects to be Discharged to[de-identified] Apartment  Current DME Used/Available at Home: Onur Angulo, straight, Walker  Tub or Shower Type: Tub/Shower combination    EXAMINATION/PRESENTATION/DECISION MAKING:   Critical Behavior:  Neurologic State: Lethargic  Orientation Level: Oriented to person, Disoriented to time, Disoriented to place, Disoriented to situation  Cognition: Impaired decision making, Follows commands, Poor safety awareness  Safety/Judgement: Lack of insight into deficits, Decreased insight into deficits, Decreased awareness of need for safety, Decreased awareness of need for assistance, Decreased awareness of environment  Hearing: Auditory  Auditory Impairment: None  Skin:  IV;   Edema: BLE's and hands  Range Of Motion:  AROM: Grossly decreased, non-functional           PROM: Generally decreased, functional           Strength:    Strength: Generally decreased, functional                    Tone & Sensation:   Tone: Abnormal (cogwheel rigidity due to Parkinson's)              Sensation: Intact               Coordination:  Coordination: Grossly decreased, non-functional  Vision:   Acuity: Able to read clock/calendar on wall without difficulty  Functional Mobility:  Bed Mobility:  Rolling: Total assistance  Supine to Sit: Total assistance  Sit to Supine: Total assistance  Scooting: Total assistance  Transfers:  Sit to Stand: Maximum assistance; Moderate assistance;Assist x2  Stand to Sit: Maximum assistance; Moderate assistance;Assist x2                       Balance:   Sitting: Impaired;High guard; With support  Sitting - Static: Prop sitting; Fair (occasional)  Sitting - Dynamic: Poor (constant support)  Standing: Impaired; With support  Ambulation/Gait Training:  Distance (ft): 6 Feet (ft) (2' forward, back and sidestepping)  Assistive Device: Walker, rolling;Gait belt  Ambulation - Level of Assistance: Maximum assistance; Moderate assistance;Assist x2        Gait Abnormalities: Decreased step clearance; Festinating gait; Path deviations        Base of Support: Narrowed; Center of gravity altered (falling posteriorly)     Speed/Vesna: Slow;Shuffled;Pace decreased (<100 feet/min)  Step Length: Right shortened;Left shortened                    Functional Measure:  Villarreal Balance Test:    Sitting to Standin  Standing Unsupported: 0  Sitting with Back Unsupported: 3  Standing to Sittin  Transfers: 0  Standing Unsupported with Eyes Closed: 0  Standing Unsupported with Feet Together: 0  Reach Forward with Outstretched Arm: 0   Object: 0  Turn to Look Over Shoulders: 0  Turn 360 Degrees: 0  Alternate Foot on Step/Stool: 0  Standing Unsupported One Foot in Front: 0  Stand on One Le  Total: 3         56=Maximum possible score;   0-20=High fall risk  21-40=Moderate fall risk   41-56=Low fall risk     Villarreal Balance Test and G-code impairment scale:  Percentage of Impairment CH    0%   CI    1-19% CJ    20-39% CK    40-59% CL    60-79% CM    80-99% CN     100%   Villarreal   Score 0-56 56 45-55 34-44 23-33 12-22 1-11 0         G codes: In compliance with CMSs Claims Based Outcome Reporting, the following G-code set was chosen for this patient based on their primary functional limitation being treated: The outcome measure chosen to determine the severity of the functional limitation was the Layman Jesus with a score of 3/56 which was correlated with the impairment scale.     ? Mobility - Walking and Moving Around:     - CURRENT STATUS: CM - 80%-99% impaired, limited or restricted    - GOAL STATUS: CL - 60%-79% impaired, limited or restricted    - D/C STATUS:  ---------------To be determined---------------      Physical Therapy Evaluation Charge Determination   History Examination Presentation Decision-Making   HIGH Complexity :3+ comorbidities / personal factors will impact the outcome/ POC  HIGH Complexity : 4+ Standardized tests and measures addressing body structure, function, activity limitation and / or participation in recreation  HIGH Complexity : Unstable and unpredictable characteristics  Other outcome measures barthel  HIGH       Based on the above components, the patient evaluation is determined to be of the following complexity level: HIGH     Pain:  Pain Scale 1: Numeric (0 - 10)  Pain Intensity 1: 0              Activity Tolerance:   fair  Please refer to the flowsheet for vital signs taken during this treatment. After treatment:   []         Patient left in no apparent distress sitting up in chair  [x]         Patient left in no apparent distress in bed  [x]         Call bell left within reach  [x]         Nursing notified  []         Caregiver present  []         Bed alarm activated    COMMUNICATION/EDUCATION:   The patients plan of care was discussed with: Registered Nurse. [x]         Fall prevention education was provided and the patient/caregiver indicated understanding. [x]         Patient/family have participated as able in goal setting and plan of care. [x]         Patient/family agree to work toward stated goals and plan of care. []         Patient understands intent and goals of therapy, but is neutral about his/her participation. []         Patient is unable to participate in goal setting and plan of care.     Thank you for this referral.  Shari Charles, PT   Time Calculation: 30 mins

## 2018-07-15 LAB
ANION GAP SERPL CALC-SCNC: 7 MMOL/L (ref 5–15)
BUN SERPL-MCNC: 24 MG/DL (ref 6–20)
BUN/CREAT SERPL: 20 (ref 12–20)
CALCIUM SERPL-MCNC: 8.6 MG/DL (ref 8.5–10.1)
CHLORIDE SERPL-SCNC: 106 MMOL/L (ref 97–108)
CO2 SERPL-SCNC: 30 MMOL/L (ref 21–32)
CREAT SERPL-MCNC: 1.2 MG/DL (ref 0.7–1.3)
ERYTHROCYTE [DISTWIDTH] IN BLOOD BY AUTOMATED COUNT: 15.4 % (ref 11.5–14.5)
GLUCOSE BLD STRIP.AUTO-MCNC: 103 MG/DL (ref 65–100)
GLUCOSE BLD STRIP.AUTO-MCNC: 118 MG/DL (ref 65–100)
GLUCOSE BLD STRIP.AUTO-MCNC: 119 MG/DL (ref 65–100)
GLUCOSE BLD STRIP.AUTO-MCNC: 94 MG/DL (ref 65–100)
GLUCOSE SERPL-MCNC: 100 MG/DL (ref 65–100)
HCT VFR BLD AUTO: 36.9 % (ref 36.6–50.3)
HGB BLD-MCNC: 11.9 G/DL (ref 12.1–17)
MCH RBC QN AUTO: 29.8 PG (ref 26–34)
MCHC RBC AUTO-ENTMCNC: 32.2 G/DL (ref 30–36.5)
MCV RBC AUTO: 92.5 FL (ref 80–99)
NRBC # BLD: 0 K/UL (ref 0–0.01)
NRBC BLD-RTO: 0 PER 100 WBC
PLATELET # BLD AUTO: 178 K/UL (ref 150–400)
PMV BLD AUTO: 11 FL (ref 8.9–12.9)
POTASSIUM SERPL-SCNC: 3.8 MMOL/L (ref 3.5–5.1)
RBC # BLD AUTO: 3.99 M/UL (ref 4.1–5.7)
SERVICE CMNT-IMP: ABNORMAL
SERVICE CMNT-IMP: NORMAL
SODIUM SERPL-SCNC: 143 MMOL/L (ref 136–145)
WBC # BLD AUTO: 4.9 K/UL (ref 4.1–11.1)

## 2018-07-15 PROCEDURE — 85027 COMPLETE CBC AUTOMATED: CPT | Performed by: FAMILY MEDICINE

## 2018-07-15 PROCEDURE — 80048 BASIC METABOLIC PNL TOTAL CA: CPT | Performed by: FAMILY MEDICINE

## 2018-07-15 PROCEDURE — 36415 COLL VENOUS BLD VENIPUNCTURE: CPT | Performed by: FAMILY MEDICINE

## 2018-07-15 PROCEDURE — 77030020186 HC BOOT HL PROTCT SAGE -B

## 2018-07-15 PROCEDURE — 82962 GLUCOSE BLOOD TEST: CPT

## 2018-07-15 PROCEDURE — 65390000012 HC CONDITION CODE 44 OBSERVATION

## 2018-07-15 PROCEDURE — 74011250637 HC RX REV CODE- 250/637: Performed by: STUDENT IN AN ORGANIZED HEALTH CARE EDUCATION/TRAINING PROGRAM

## 2018-07-15 PROCEDURE — 96374 THER/PROPH/DIAG INJ IV PUSH: CPT

## 2018-07-15 PROCEDURE — 99218 HC RM OBSERVATION: CPT

## 2018-07-15 PROCEDURE — 74011250636 HC RX REV CODE- 250/636: Performed by: FAMILY MEDICINE

## 2018-07-15 RX ORDER — SIMVASTATIN 40 MG/1
40 TABLET, FILM COATED ORAL
Qty: 30 TAB | Refills: 0 | Status: SHIPPED
Start: 2018-07-16

## 2018-07-15 RX ORDER — HYDRALAZINE HYDROCHLORIDE 20 MG/ML
20 INJECTION INTRAMUSCULAR; INTRAVENOUS
Status: DISCONTINUED | OUTPATIENT
Start: 2018-07-15 | End: 2018-07-17 | Stop reason: HOSPADM

## 2018-07-15 RX ADMIN — HYDRALAZINE HYDROCHLORIDE 20 MG: 20 INJECTION INTRAMUSCULAR; INTRAVENOUS at 00:01

## 2018-07-15 RX ADMIN — CARBIDOPA AND LEVODOPA 1 TABLET: 25; 250 TABLET ORAL at 08:44

## 2018-07-15 RX ADMIN — SERTRALINE HYDROCHLORIDE 50 MG: 50 TABLET ORAL at 08:44

## 2018-07-15 RX ADMIN — CARBIDOPA AND LEVODOPA 1 TABLET: 25; 250 TABLET ORAL at 16:24

## 2018-07-15 RX ADMIN — LOSARTAN POTASSIUM 50 MG: 50 TABLET ORAL at 08:44

## 2018-07-15 RX ADMIN — APIXABAN 2.5 MG: 2.5 TABLET, FILM COATED ORAL at 18:40

## 2018-07-15 RX ADMIN — APIXABAN 2.5 MG: 2.5 TABLET, FILM COATED ORAL at 08:44

## 2018-07-15 RX ADMIN — FUROSEMIDE 20 MG: 40 TABLET ORAL at 08:44

## 2018-07-15 RX ADMIN — Medication 10 ML: at 06:41

## 2018-07-15 RX ADMIN — LABETALOL HCL 300 MG: 100 TABLET, FILM COATED ORAL at 08:44

## 2018-07-15 RX ADMIN — CARBIDOPA AND LEVODOPA 1 TABLET: 25; 250 TABLET ORAL at 22:03

## 2018-07-15 RX ADMIN — SIMVASTATIN 40 MG: 20 TABLET, FILM COATED ORAL at 22:03

## 2018-07-15 RX ADMIN — LABETALOL HCL 300 MG: 100 TABLET, FILM COATED ORAL at 20:40

## 2018-07-15 RX ADMIN — Medication 10 ML: at 22:03

## 2018-07-15 RX ADMIN — Medication 10 ML: at 00:00

## 2018-07-15 NOTE — PROGRESS NOTES
Bedside and Verbal shift change report given to [address] (oncoming nurse) by Ovid Dakin (offgoing nurse). Report included the following information SBAR and Kardex.

## 2018-07-15 NOTE — PROGRESS NOTES
Spoke to daughter. Update on current plan of care and answered all questions.      Carol Carrillo MD    Peqz -[phone]

## 2018-07-15 NOTE — PROGRESS NOTES
1:30pm  7/15/2018    CM received contact from [facility] nurse regarding patient's  Code 44 status. CM provided patient with a copy of the Code 44 letter and placed one to be scanned into his chart. CM also educated patient on Obs status and provided a copy of the [address] [place] [address] letter, which patient declined to sign.

## 2018-07-15 NOTE — PROGRESS NOTES
Problem: Falls - Risk of  Goal: *Absence of Falls  Document Sukhwinder Fall Risk and appropriate interventions in the flowsheet. Outcome: Progressing Towards Goal  Fall Risk Interventions:  Mobility Interventions: Bed/chair exit alarm, Communicate number of staff needed for ambulation/transfer    Mentation Interventions: Bed/chair exit alarm, More frequent rounding, Reorient patient, Room close to nurse's station, Toileting rounds, Update white board    Medication Interventions: Bed/chair exit alarm    Elimination Interventions: Bed/chair exit alarm, Call light in reach, Toileting schedule/hourly rounds             Problem: Pressure Injury - Risk of  Goal: *Prevention of pressure injury  Document Song Scale and appropriate interventions in the flowsheet. Outcome: Progressing Towards Goal  Pressure Injury Interventions:  Sensory Interventions: Assess changes in LOC, Assess need for specialty bed, Check visual cues for pain    Moisture Interventions: Assess need for specialty bed, Limit adult briefs, Maintain skin hydration (lotion/cream), Minimize layers, Moisture barrier, Offer toileting Q_hr    Activity Interventions: Assess need for specialty bed, Increase time out of bed, Pressure redistribution bed/mattress(bed type), PT/OT evaluation    Mobility Interventions: Float heels, HOB 30 degrees or less, Pressure redistribution bed/mattress (bed type), PT/OT evaluation, Turn and reposition approx.  every two hours(pillow and wedges)    Nutrition Interventions: Document food/fluid/supplement intake    Friction and Shear Interventions: HOB 30 degrees or less, Lift sheet

## 2018-07-15 NOTE — PROGRESS NOTES
Notified on call MD pt blood pressure 183/110 and 189/116 requesting as prn medications. MD advice to give scheduled Labetalol po and recheck blood pressure after for poss. Hydralazine dose. 2346 /101 Informed  on call with new orders.

## 2018-07-15 NOTE — PROGRESS NOTES
Attempted to call daughter again at listed cell number ([phone]), went straight to voicemail. When I called this morning it rang for several times and then went to voicemail. Attempted to call at number pharmacy had ([phone]) - Ava Hanks (granddaughter) answered. Said she was aware grandfather was in the hospital, stated her mom Macho Medina) would be coming by the hospital later this afternoon, likely around 4pm, as she works until [employer]. Reports the cell phone listed is incorrect, she lost that cell phone and is waiting to get a new cell phone. Explained that we were concerned as we were told she was coming to the hospital and ED had even considered discharge but was never able to reach family so could not do safe discharge. Ava Hanks unsure why her mom was unable to be reached but states Fátima Gauthier will be by this afternoon. Will ask someone from the [facility] team to discuss with daughter. Informed Ava Hanks that if Ftáima Gauthier does not come later this afternoon we will call [phone] again (granddaughter's number) to check in further. She expressed understanding. No questions at this time.      Yevgeniy Pineda MD

## 2018-07-16 PROBLEM — R53.1 WEAKNESS: Status: ACTIVE | Noted: 2018-07-16

## 2018-07-16 PROBLEM — I10 ESSENTIAL HYPERTENSION: Status: ACTIVE | Noted: 2018-07-16

## 2018-07-16 PROBLEM — G20 PARKINSON'S DISEASE (HCC): Status: ACTIVE | Noted: 2018-07-16
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
## 2018-07-16 PROBLEM — E11.22 CKD STAGE 2 DUE TO TYPE 2 DIABETES MELLITUS (HCC): Status: ACTIVE | Noted: 2018-07-16

## 2018-07-16 PROBLEM — N18.2 CKD STAGE 2 DUE TO TYPE 2 DIABETES MELLITUS (HCC): Status: ACTIVE | Noted: 2018-07-16

## 2018-07-16 PROBLEM — E11.9 TYPE 2 DIABETES MELLITUS (HCC): Status: ACTIVE | Noted: 2018-07-16

## 2018-07-16 LAB
ANION GAP SERPL CALC-SCNC: 8 MMOL/L (ref 5–15)
BUN SERPL-MCNC: 25 MG/DL (ref 6–20)
BUN/CREAT SERPL: 18 (ref 12–20)
CALCIUM SERPL-MCNC: 8.4 MG/DL (ref 8.5–10.1)
CHLORIDE SERPL-SCNC: 106 MMOL/L (ref 97–108)
CO2 SERPL-SCNC: 27 MMOL/L (ref 21–32)
CREAT SERPL-MCNC: 1.36 MG/DL (ref 0.7–1.3)
ERYTHROCYTE [DISTWIDTH] IN BLOOD BY AUTOMATED COUNT: 15.4 % (ref 11.5–14.5)
GLUCOSE BLD STRIP.AUTO-MCNC: 103 MG/DL (ref 65–100)
GLUCOSE BLD STRIP.AUTO-MCNC: 120 MG/DL (ref 65–100)
GLUCOSE BLD STRIP.AUTO-MCNC: 124 MG/DL (ref 65–100)
GLUCOSE BLD STRIP.AUTO-MCNC: 124 MG/DL (ref 65–100)
GLUCOSE SERPL-MCNC: 127 MG/DL (ref 65–100)
HCT VFR BLD AUTO: 35.7 % (ref 36.6–50.3)
HGB BLD-MCNC: 11.7 G/DL (ref 12.1–17)
MCH RBC QN AUTO: 30.1 PG (ref 26–34)
MCHC RBC AUTO-ENTMCNC: 32.8 G/DL (ref 30–36.5)
MCV RBC AUTO: 91.8 FL (ref 80–99)
NRBC # BLD: 0 K/UL (ref 0–0.01)
NRBC BLD-RTO: 0 PER 100 WBC
PLATELET # BLD AUTO: 179 K/UL (ref 150–400)
PMV BLD AUTO: 10.3 FL (ref 8.9–12.9)
POTASSIUM SERPL-SCNC: 3.7 MMOL/L (ref 3.5–5.1)
RBC # BLD AUTO: 3.89 M/UL (ref 4.1–5.7)
SERVICE CMNT-IMP: ABNORMAL
SODIUM SERPL-SCNC: 141 MMOL/L (ref 136–145)
WBC # BLD AUTO: 7.6 K/UL (ref 4.1–11.1)

## 2018-07-16 PROCEDURE — 74011250637 HC RX REV CODE- 250/637: Performed by: STUDENT IN AN ORGANIZED HEALTH CARE EDUCATION/TRAINING PROGRAM

## 2018-07-16 PROCEDURE — 85027 COMPLETE CBC AUTOMATED: CPT

## 2018-07-16 PROCEDURE — 82962 GLUCOSE BLOOD TEST: CPT

## 2018-07-16 PROCEDURE — 65270000029 HC RM PRIVATE

## 2018-07-16 PROCEDURE — 80048 BASIC METABOLIC PNL TOTAL CA: CPT

## 2018-07-16 PROCEDURE — 99218 HC RM OBSERVATION: CPT

## 2018-07-16 PROCEDURE — 36415 COLL VENOUS BLD VENIPUNCTURE: CPT

## 2018-07-16 RX ADMIN — CARBIDOPA AND LEVODOPA 1 TABLET: 25; 250 TABLET ORAL at 22:03

## 2018-07-16 RX ADMIN — Medication 10 ML: at 06:00

## 2018-07-16 RX ADMIN — FUROSEMIDE 20 MG: 40 TABLET ORAL at 08:05

## 2018-07-16 RX ADMIN — LABETALOL HCL 300 MG: 100 TABLET, FILM COATED ORAL at 08:05

## 2018-07-16 RX ADMIN — APIXABAN 2.5 MG: 2.5 TABLET, FILM COATED ORAL at 17:45

## 2018-07-16 RX ADMIN — SERTRALINE HYDROCHLORIDE 50 MG: 50 TABLET ORAL at 08:05

## 2018-07-16 RX ADMIN — CARBIDOPA AND LEVODOPA 1 TABLET: 25; 250 TABLET ORAL at 16:49

## 2018-07-16 RX ADMIN — SIMVASTATIN 40 MG: 20 TABLET, FILM COATED ORAL at 22:00

## 2018-07-16 RX ADMIN — LABETALOL HCL 300 MG: 100 TABLET, FILM COATED ORAL at 22:02

## 2018-07-16 RX ADMIN — LOSARTAN POTASSIUM 50 MG: 50 TABLET ORAL at 08:05

## 2018-07-16 RX ADMIN — CARBIDOPA AND LEVODOPA 1 TABLET: 25; 250 TABLET ORAL at 08:05

## 2018-07-16 RX ADMIN — Medication 10 ML: at 22:03

## 2018-07-16 RX ADMIN — Medication 10 ML: at 13:02

## 2018-07-16 RX ADMIN — APIXABAN 2.5 MG: 2.5 TABLET, FILM COATED ORAL at 08:05

## 2018-07-16 NOTE — PROGRESS NOTES
Spiritual Care Partner Volunteer visited patient in Med Surg on 7/16/18. Documented by:    Una Yan M.S.   Spiritual Care Department  If needs arise please call SANDRA ([phone])

## 2018-07-16 NOTE — PROGRESS NOTES
Problem: Falls - Risk of  Goal: *Absence of Falls  Document Sukhwinder Fall Risk and appropriate interventions in the flowsheet.    Outcome: Progressing Towards Goal  Fall Risk Interventions:  Mobility Interventions: Bed/chair exit alarm    Mentation Interventions: Bed/chair exit alarm    Medication Interventions: Bed/chair exit alarm    Elimination Interventions: Bed/chair exit alarm

## 2018-07-16 NOTE — PROGRESS NOTES
1200: Patient has been lethargic throughout the morning. Awakes to voice. Delayed response to questions. Notified residents. Will be in to see the patient. Bedside shift change report given to [address] (oncoming nurse) by Noe Kelly RN (offgoing nurse). Report included the following information SBAR, Kardex, Intake/Output, MAR and Recent Results.

## 2018-07-16 NOTE — PROGRESS NOTES
NUTRITION education       Nutrition Assessment:   Consult received for diet education. Pt poor historian with history of dementia. Parkinson's, DM, HTN, LDL. Pt familiar to Jose R Waite from previous admission in January. Pt not appropriate for DM diet education with poor memory and retention of information. Pt is on observation status and might d/c if/when family is able to attend to him. Pt currently on CCD/2gm Na diet. Eating ~75% of meals. Added Ensure HP once per day as snack. BMI 30.5.      Patient Vitals for the past 168 hrs:   % Diet Eaten   07/15/18 1158 75 %   07/14/18 1028 75 %     Jamie Dyer, [address]

## 2018-07-16 NOTE — PROGRESS NOTES
Samir Na[facility] [address] Office [phone] Fax [phone] Assessment and Plan Vaibhav Rayo is a 80 y.o. male w/ history of HTN, dementia, Parkinson's, CYNTHIA, T2DM (diet controlled), HFpEF, DVT, HLD, and depression who was admitted for generalized weakness. 24 Hour Events:  
- Daughter was reached by telephone yesterday - Received IV hyrdalazine 20mg x 1 overnight for elevated BPs Generalized weakness POA - normal Neuro exam. Head CT neg. His UA was negative. CXR - Poor inspiratory effort with bibasilar mild interstitial prominence but no definite acute disease.  
- Nutrition following 
- PT/OT recommending SNF for rehab - CM consulted 7/14 Possible new edema in BLE w/ hx of HFpEF - unsure if this is new swelling as patient was alone and poor historian. Last echo on 1/9/2018 - Left ventricle: Ejection fraction was estimated to be 50 %. Wall thickness was mildly increased. Admission BNP elevated to 12,000 (previously 3,000 in 1/2018) - Continue home lasix 20mg daily 
- ECHO today Chronically elevated troponin - POA Trop 0.20 --> 0.17. Both at baseline.  
  
Parkinson's Dementia  - Patient oriented to person, place, but not time. Was not aware of month or year. No resting tremor on exam. Unclear what patient's baseline is as no family around to corroborate. - continue home sinemet  mg TID 
 
CKD Stage 2 (baseline Cr 1.2-1.3) - Cr 1.20 today, at baseline.  
  
Depression - stable. - continue home sertraline 50 mg daily 
  
Hypertension - Elevated on admission to 572S-637A systolic but now improved, stable in 759J-770M systolic.  
- Home lasix 20 mg daily, labetalol 300 mg BID, losartan 50 mg daily. - Will continue to monitor at this time and readjust as BP's trend. Consider PRN hydralazine.  
  
Diabetes Mellitus T2:  Diet controlled. No home meds.   
- Last HgA1c on 7/2014 was 6.8, repeat A1c on admission 7.0.  
- POC glucose checks ACHS with SSI, normal sensitivity - Has not required any lispro this admission.  
  
History of DVT - Continue home eliquis 2.5 mg BID 
  
Hyperlipidemia: Last lipid panel on 2018 and values were Chol 229 TG 51 .8 HDL 86 
- Continue home simvastatin 40 mg daily 
   
FEN/GI -  Diabetic diet with sodium precautions. Activity - Ambulate with assistance DVT prophylaxis - anticoagulated with home apixaban GI prophylaxis -  Not indicated Disposition - SNF, CM consulted  
  
CODE STATUS:  Full Pt will be discussed with Dr. Lola Robin. I appreciate the opportunity to participate in the care of this patient, Gisele Mandujano MD 
Family Medicine Resident Subjective / Objective Subjective He reports that he is feeling a lot stronger, denies any pain, SOB, dizziness, weakness. Is c/o chronic cough. Temp (24hrs), Av.5 °F (36.9 °C), Min:98 °F (36.7 °C), Max:99.3 °F (37.4 °C) Objective: 
Vital signs: (most recent): Blood pressure 170/89, pulse 83, temperature 98.5 °F (36.9 °C), resp. rate 18, weight 182 lb 15.7 oz (83 kg), SpO2 96 %. Respiratory:   O2 Device: Room air Visit Vitals  /89 (BP 1 Location: Right arm, BP Patient Position: At rest)  Pulse 83  Temp 98.5 °F (36.9 °C)  Resp 18  Wt 182 lb 15.7 oz (83 kg)  SpO2 96%  BMI 29.53 kg/m2 General: No acute distress. Alert. Cooperative. Head: Normocephalic. Atraumatic. Eyes:              Conjunctiva pink. Sclera white. PERRL. Neck: Supple. Normal ROM. No stiffness. Respiratory: CTAB. No w/r/r/c.  
Cardiovascular: RRR. Normal S1,S2. No m/r/g. Pulses 2+ throughout. GI: + bowel sounds. Nontender. No rebound tenderness or guarding. Nondistended. Musculoskeletal: Full ROM in all extremities. 2+ pitting LE edema. Distal pulses intact. Skin: Warm, dry. No rashes. Neuro: CN II-XII grossly intact. Strength 5/5 in all extremities. Oriented to person, place, but not time.  
  
 
I/O: 
 
Date 07/15/18 0700 - 07/16/18 3585 07/16/18 0700 - 07/17/18 2586 Shift 0700-1859 6677-7176 24 Hour Total 4196-4395 0510-3091 24 Hour Total  
I 
N 
T 
A 
K 
E 
 P.O. 240 200 440     
   P. O. 240 200 440 Shift Total 
(mL/kg) 240 
(2.9) 200 
(2.4) 440 
(5.3) O 
U T 
P 
U Mariluz Spurling Urine (mL/kg/hr) Urine Occurrence(s)  1 x 1 x Stool Stool Occurrence(s) 1 x 1 x 2 x Shift Total 
(mL/kg)  200 440 Weight (kg) 83 83 83 83 83 83 CBC: 
Recent Labs  
   07/16/18 
 0245  07/15/18 
 0424  07/14/18 
 0356 WBC  7.6  4.9  3.3* HGB  11.7*  11.9*  10.6* HCT  35.7*  36.9  33.3*  
PLT  179  178  149* Metabolic Panel: 
Recent Labs  
   07/16/18 
 0245  07/15/18 
 0424 07/14/18 
 0356  07/13/18 
 1328 NA  141  143  146*  145  
K  3.7  3.8  4.0  4.2 CL  106  106  109*  107 CO2  27  30  28  32 BUN  25*  24*  29*  31* CREA  1.36*  1.20  1.17  1.31* GLU  127*  100  95  123* CA  8.4*  8.6  8.3*  9.0 ALB   --    --    --   3.4* SGOT   --    --    --   55* ALT   --    --    --   42 For Billing Chief Complaint Patient presents with  Fatigue Hospital Problems  Date Reviewed: 1/9/2018 Codes Class Noted POA Depression ICD-10-CM: F32.9 ICD-9-CM: 239  7/14/2018 Unknown * (Principal)Weakness generalized ICD-10-CM: R53.1 ICD-9-CM: 780.79  7/13/2018 Unknown History of DVT (deep vein thrombosis) ICD-10-CM: H48.233 ICD-9-CM: V12.51  4/11/2017 Yes Diastolic dysfunction with chronic heart failure ([facility] ICD-10-CM: I50.32 
ICD-9-CM: 428.32  12/21/2015 Yes Parkinsons disease ([facility] ICD-10-CM: G20 
ICD-9-CM: 332.0  5/26/2015 Yes Diabetes mellitus type II, controlled ([facility] ICD-10-CM: E11.9 ICD-9-CM: 250.00  1/26/2015 Yes Hyperlipidemia ICD-10-CM: E78.5 ICD-9-CM: 272.4  8/16/2012 Yes Overview Signed 8/16/2012  1:36 PM by Ina Ortiz MD  
  LDL goal < 70  HTN (hypertension) ICD-10-CM: I10 
ICD-9-CM: 401.9  3/19/2011 Yes Overview Signed 5/23/2011  8:08 AM by Ben Lima MD  
  ECHO 5/2011 = LVH, EF 50%

## 2018-07-16 NOTE — PROGRESS NOTES
Problem: Falls - Risk of  Goal: *Absence of Falls  Document Sukhwinder Fall Risk and appropriate interventions in the flowsheet. Outcome: Progressing Towards Goal  Fall Risk Interventions:  Mobility Interventions: Communicate number of staff needed for ambulation/transfer, OT consult for ADLs, Patient to call before getting OOB, PT Consult for mobility concerns, PT Consult for assist device competence, Utilize walker, cane, or other assistive device    Mentation Interventions: Adequate sleep, hydration, pain control, Door open when patient unattended, Family/sitter at bedside, Gait belt with transfers/ambulation, Increase mobility, More frequent rounding, Reorient patient, Toileting rounds, Update white board    Medication Interventions: Patient to call before getting OOB, Teach patient to arise slowly, Bed/chair exit alarm, Utilize gait belt for transfers/ambulation    Elimination Interventions: Bed/chair exit alarm, Call light in reach, Patient to call for help with toileting needs, Toileting schedule/hourly rounds, Urinal in reach             Problem: Pressure Injury - Risk of  Goal: *Prevention of pressure injury  Document Song Scale and appropriate interventions in the flowsheet. Outcome: Progressing Towards Goal  Pressure Injury Interventions:  Sensory Interventions: Assess changes in LOC, Check visual cues for pain, Keep linens dry and wrinkle-free, Float heels, Discuss PT/OT consult with provider, Minimize linen layers, Maintain/enhance activity level, Monitor skin under medical devices, Turn and reposition approx.  every two hours (pillows and wedges if needed), Pressure redistribution bed/mattress (bed type)    Moisture Interventions: Absorbent underpads, Apply protective barrier, creams and emollients, Assess need for specialty bed, Check for incontinence Q2 hours and as needed, Limit adult briefs, Minimize layers, Moisture barrier, Offer toileting Q_hr, Maintain skin hydration (lotion/cream)    Activity Interventions: Increase time out of bed, Pressure redistribution bed/mattress(bed type), PT/OT evaluation    Mobility Interventions: HOB 30 degrees or less, Pressure redistribution bed/mattress (bed type), PT/OT evaluation, Turn and reposition approx.  every two hours(pillow and wedges), Float heels    Nutrition Interventions: Document food/fluid/supplement intake, Offer support with meals,snacks and hydration    Friction and Shear Interventions: HOB 30 degrees or less, Apply protective barrier, creams and emollients, Lift team/patient mobility team, Minimize layers

## 2018-07-16 NOTE — ROUTINE PROCESS
Bedside shift change report given to Raye Ormond (oncoming nurse) by Joaquina Burns (offgoing nurse). Report included the following information SBAR, MAR and Recent Results.

## 2018-07-16 NOTE — PROGRESS NOTES
07/16/18     MSW tried calling daughter. Phone numbers on face sheet are incorrect. New phone numbers, [phone], [phone]. Met with ford Castillo from [facility], [phone] as she received referral for another CM. She called and talked with the daughter. She stated that agreed to referral at [facility], [facility]man Rendon. She will take information to the Medical Director and will give answer tomorrow.     Sapna Renteria MSW

## 2018-07-17 ENCOUNTER — APPOINTMENT (OUTPATIENT)
Dept: GENERAL RADIOLOGY | Age: 83
DRG: 948 | End: 2018-07-17
Attending: FAMILY MEDICINE
Payer: MEDICARE

## 2018-07-17 VITALS
TEMPERATURE: 98 F | WEIGHT: 188.8 LBS | HEART RATE: 84 BPM | RESPIRATION RATE: 18 BRPM | BODY MASS INDEX: 30.47 KG/M2 | SYSTOLIC BLOOD PRESSURE: 135 MMHG | DIASTOLIC BLOOD PRESSURE: 83 MMHG | OXYGEN SATURATION: 97 %

## 2018-07-17 LAB
ANION GAP SERPL CALC-SCNC: 8 MMOL/L (ref 5–15)
BUN SERPL-MCNC: 27 MG/DL (ref 6–20)
BUN/CREAT SERPL: 21 (ref 12–20)
CALCIUM SERPL-MCNC: 8.1 MG/DL (ref 8.5–10.1)
CHLORIDE SERPL-SCNC: 105 MMOL/L (ref 97–108)
CO2 SERPL-SCNC: 30 MMOL/L (ref 21–32)
CREAT SERPL-MCNC: 1.29 MG/DL (ref 0.7–1.3)
GLUCOSE BLD STRIP.AUTO-MCNC: 110 MG/DL (ref 65–100)
GLUCOSE BLD STRIP.AUTO-MCNC: 110 MG/DL (ref 65–100)
GLUCOSE BLD STRIP.AUTO-MCNC: 117 MG/DL (ref 65–100)
GLUCOSE SERPL-MCNC: 132 MG/DL (ref 65–100)
POTASSIUM SERPL-SCNC: 3.5 MMOL/L (ref 3.5–5.1)
SERVICE CMNT-IMP: ABNORMAL
SODIUM SERPL-SCNC: 143 MMOL/L (ref 136–145)

## 2018-07-17 PROCEDURE — 74011250637 HC RX REV CODE- 250/637: Performed by: STUDENT IN AN ORGANIZED HEALTH CARE EDUCATION/TRAINING PROGRAM

## 2018-07-17 PROCEDURE — 36415 COLL VENOUS BLD VENIPUNCTURE: CPT | Performed by: FAMILY MEDICINE

## 2018-07-17 PROCEDURE — 82962 GLUCOSE BLOOD TEST: CPT

## 2018-07-17 PROCEDURE — 92610 EVALUATE SWALLOWING FUNCTION: CPT

## 2018-07-17 PROCEDURE — 97110 THERAPEUTIC EXERCISES: CPT

## 2018-07-17 PROCEDURE — 97530 THERAPEUTIC ACTIVITIES: CPT

## 2018-07-17 PROCEDURE — 71045 X-RAY EXAM CHEST 1 VIEW: CPT

## 2018-07-17 PROCEDURE — 80048 BASIC METABOLIC PNL TOTAL CA: CPT | Performed by: FAMILY MEDICINE

## 2018-07-17 RX ADMIN — SERTRALINE HYDROCHLORIDE 50 MG: 50 TABLET ORAL at 08:14

## 2018-07-17 RX ADMIN — CARBIDOPA AND LEVODOPA 1 TABLET: 25; 250 TABLET ORAL at 08:14

## 2018-07-17 RX ADMIN — FUROSEMIDE 20 MG: 40 TABLET ORAL at 08:14

## 2018-07-17 RX ADMIN — APIXABAN 2.5 MG: 2.5 TABLET, FILM COATED ORAL at 17:32

## 2018-07-17 RX ADMIN — Medication 5 ML: at 06:00

## 2018-07-17 RX ADMIN — APIXABAN 2.5 MG: 2.5 TABLET, FILM COATED ORAL at 08:14

## 2018-07-17 RX ADMIN — LABETALOL HCL 300 MG: 100 TABLET, FILM COATED ORAL at 08:14

## 2018-07-17 RX ADMIN — LOSARTAN POTASSIUM 50 MG: 50 TABLET ORAL at 08:14

## 2018-07-17 RX ADMIN — CARBIDOPA AND LEVODOPA 1 TABLET: 25; 250 TABLET ORAL at 16:44

## 2018-07-17 NOTE — ROUTINE PROCESS
Bedside shift change report given to Malu (oncoming nurse) by Krysten Fuller (offgoing nurse). Report included the following information SBAR, MAR and Recent Results.

## 2018-07-17 NOTE — PROGRESS NOTES
Report called to raiza at [facility] and [facility] [phone]. Iv removed left ac site clear. Gauze and tape applied. Pt belongings packed and pt ready for ambulance transport.

## 2018-07-17 NOTE — PROGRESS NOTES
Problem: Self Care Deficits Care Plan (Adult)  Goal: *Acute Goals and Plan of Care (Insert Text)  Occupational Therapy Goals  Initiated 7/14/2018  1. Patient will perform self-feeding with minimal assistance/contact guard assist within 7 day(s). 2.  Patient will perform grooming with minimal assistance/contact guard assist within 7 day(s). 3.  Patient will perform upper body dressing with minimal assistance/contact guard assist within 7 day(s). 4.  Patient will perform toilet transfers with moderate assistance  within 7 day(s). 5.  Patient will perform all aspects of toileting with maximal assistance within 7 day(s). 6.  Patient will participate in upper extremity therapeutic exercise/activities with supervision/set-up for 5 minutes within 7 day(s). 7.  Patient will utilize energy conservation techniques during functional activities with verbal cues within 7 day(s). Occupational Therapy TREATMENT  Patient: Nevin Lennox (5 y.o. male)  Date: 7/17/2018  Diagnosis: Weakness generalized  Generalized weakness  Weakness generalized  Weakness  Fall  Parkinson's disease ([facility]  CKD stage 2 due to type 2 diabetes mellitus ([facility] [facility]  Type 2 diabetes mellitus ([facility]  Essential hypertension Weakness generalized       Precautions: Fall  Chart, occupational therapy assessment, plan of care, and goals were reviewed. ASSESSMENT:  Pt presents with flat affect, not communicating when asked questions. After supine to sit, pt maintained his balance with high guard however leans posterior. When presented with wet wipe for hygiene pt needed physical assist  to reach for cloth to bring to his face. Total assist for face washing. Pt not following simple commands for exercise such as to extend LE for leg kicks. Pt returned to bed with total assist. Recommend rehab.   Progression toward goals:  []       Improving appropriately and progressing toward goals  [x]       Improving slowly and progressing toward goals  [] Not making progress toward goals and plan of care will be adjusted     PLAN:  Patient continues to benefit from skilled intervention to address the above impairments. Continue treatment per established plan of care. Discharge Recommendations:  Rehab  Further Equipment Recommendations for Discharge: None     SUBJECTIVE:   Patient stated .    OBJECTIVE DATA SUMMARY:   Cognitive/Behavioral Status:  Neurologic State: Eyes open spontaneously  Orientation Level: Oriented to person;Oriented to place  Cognition: Unable to assess (comment)             Functional Mobility and Transfers for ADLs:  Bed Mobility:  Rolling: Total assistance  Supine to Sit: Total assistance  Sit to Supine: Total assistance  Scooting: Total assistance    Transfers:      Not attempted. Balance: Sitting edge of bed, high guard. ADL Intervention:       Grooming  Washing Face: Total assistance (dependent) (pt does not initiate task)  Cues: Physical assistance; Tactile cues provided;Verbal cues provided       Lower Body Dressing Assistance  Socks: Total assistance (dependent)  Position Performed: Supine  Cues: Don;Physical assistance    Toileting  Toileting Assistance: Total assistance(dependent)       Pain:  Pain Scale 1: Numeric (0 - 10)  Pain Intensity 1: 0              Activity Tolerance:   Poor  Please refer to the flowsheet for vital signs taken during this treatment.   After treatment:   [] Patient left in no apparent distress sitting up in chair  [x] Patient left in no apparent distress in bed  [x] Call bell left within reach  [x] Nursing notified  [] Caregiver present  [x] Bed alarm activated    COMMUNICATION/COLLABORATION:   The patients plan of care was discussed with: Physical Therapy Assistant, Occupational Therapist and Registered Nurse    BRAYDEN Rogers  Time Calculation: 15 mins

## 2018-07-17 NOTE — CONSULTS
Ilene Krause DO  [facility] [address], [address] [place] [address], [address] [place] [address] N[place]                                       Office [phone],IIR [phone]  Office [phone],SNI [phone]      Date of  Admission: 7/13/2018 12:54 PM  PCP- Marietta Fenton MD    Benji Granados is a 80 y.o. male admitted for Weakness generalized  Generalized weakness  Weakness generalized  Weakness  Fall  Parkinson's disease ([address]  CKD stage 2 due to type 2 diabetes mellitus ([facility]  Type 2 diabetes mellitus ([facility] [address]  Essential hypertension. Consult requested by Robin Huizar MD    Assessment/Plan    1. 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 Recommend cardiac MRI, which could be performed on an outpatient basis. Likely would limit AV elsie blocking agents, consider changing labetolol to a different antihypertensive on an outpatient basis with f/u with Dr. Nathaly Hernandez. Continue diuretic therapy. I appreciate the opportunity to be involved in [place] [id number]. See below note for details. Please do not hesitate to contact us with questions or concerns. Blanka Patino,     Cardiac Testing/ Procedures:    ECHO/JESSICA:  Left ventricle: Ejection fraction was estimated in the range of 45 % to 50%. There is a grainy appearance to the myocardium and would recommend cardiac MRI for any infiltrative processes. LVH. Subjective:  Idris Anderson is a 79 yo male with multiple medical comorbidities that was admitted for generalized weakness. He is followed by Dr. Nathaly Hernandez. Unable to obtain history from patient, history is obtained from chart review. Neurological evaluation without significant findings. Echocardiogram showed LVH, mild reduced LV function, speckled appearance of myocardium.   Chronically elevated troponin with elevated BNP    Past Medical History:   Diagnosis Date    Arthritis 5/5/2010    Diabetes mellitus type 2, diet-controlled ([facility]     DVT (deep venous thrombosis) ([facility])     right LE DVT (mostly distal and non-occlusive) discovered 6/15    HTN (hypertension) 5/5/2010    hypertensive heart disease (LVH)    Hypercholesteremia 5/5/2010     in 4/09    Orthostatic hypotension     Complaints of dizziness.  (lying) to 120 (standing) in 3/18/11.  Parkinson disease ([facility] [address]     diagnosed around middle of may 2015      No past surgical history on file. No Known Allergies  Family History   Problem Relation Age of Onset    Heart Disease Mother     Diabetes Mother     Cancer Mother     Heart Disease Father     Asthma Father       Social History   Substance Use Topics    Smoking status: Former Smoker     Packs/day: 0.30     Years: 5.00     Types: Cigarettes, Pipe    Smokeless tobacco: Former User     Types: Snuff    Alcohol use 0.0 oz/week     0 Standard drinks or equivalent per week      Comment: very rarely          Medications:  Prescriptions Prior to Admission   Medication Sig    furosemide (LASIX) 40 mg tablet Take 40 mg by mouth daily.  sertraline (ZOLOFT) 100 mg tablet Take 100 mg by mouth daily.  atorvastatin (LIPITOR) 20 mg tablet Take 20 mg by mouth daily.  labetalol (NORMODYNE) 300 mg tablet TAKE ONE TABLET BY MOUTH TWICE DAILY    losartan (COZAAR) 50 mg tablet Take 1 Tab by mouth daily.  apixaban (ELIQUIS) 2.5 mg tablet TAKE ONE TABLET BY MOUTH TWICE DAILY    carbidopa-levodopa (SINEMET)  mg per tablet TAKE ONE TABLET BY MOUTH THREE TIMES DAILY    therapeutic multivitamin (THERAGRAN) tablet Take 1 Tab by mouth daily.      Current Facility-Administered Medications   Medication Dose Route Frequency    hydrALAZINE (APRESOLINE) 20 mg/mL injection 20 mg  20 mg IntraVENous Q6H PRN    glucose chewable tablet 16 g  4 Tab Oral PRN    dextrose (D50W) injection syrg 12.5-25 g  12.5-25 g IntraVENous PRN    glucagon (GLUCAGEN) injection 1 mg  1 mg IntraMUSCular PRN    insulin lispro (HUMALOG) injection   SubCUTAneous AC&HS    apixaban (ELIQUIS) tablet 2.5 mg  2.5 mg Oral BID    carbidopa-levodopa (SINEMET)  mg per tablet 1 Tab  1 Tab Oral TID    losartan (COZAAR) tablet 50 mg  50 mg Oral DAILY    sertraline (ZOLOFT) tablet 50 mg  50 mg Oral DAILY    simvastatin (ZOCOR) tablet 40 mg  40 mg Oral QHS    sodium chloride (NS) flush 5-10 mL  5-10 mL IntraVENous Q8H    sodium chloride (NS) flush 5-10 mL  5-10 mL IntraVENous PRN    furosemide (LASIX) tablet 20 mg  20 mg Oral DAILY    labetalol (NORMODYNE) tablet 300 mg  300 mg Oral BID         Review of Systems:  Unable to obtain due to acute illnees        Physical Exam:  Visit Vitals    /72 (BP 1 Location: Right arm, BP Patient Position: At rest)    Pulse 84    Temp 98.2 °F (36.8 °C)    Resp 18    Wt 85.6 kg (188 lb 12.8 oz)    SpO2 97%    BMI 30.47 kg/m2           Gen: Well-developed, well-nourished, in no acute distress  HEENT:  Pink conjunctivae, hearing intact to voice, moist mucous membranes  Neck: Supple,No JVD, No Carotid Bruit, Thyroid- non tender  Resp: No accessory muscle use, Clear breath sounds, No rales or rhonchi  Card: Normal Rate,Regular Rythm,Normal S1, S2  Abd:  Soft, non-tender, non-distended, normoactive bowel sounds are present,   MSK: No cyanosis or clubbing  LE: bilateral edema  Vascular:Distal Pulses 2+ and symmetric        EKG: normal EKG, normal sinus rhythm, unchanged from previous tracings.            LABS:        Lab Results   Component Value Date/Time    WBC 7.6 07/16/2018 02:45 AM    HGB 11.7 (L) 07/16/2018 02:45 AM    HCT 35.7 (L) 07/16/2018 02:45 AM    PLATELET 325 46/18/0361 02:45 AM    MCV 91.8 07/16/2018 02:45 AM     Lab Results   Component Value Date/Time    Sodium 141 07/16/2018 02:45 AM    Potassium 3.7 07/16/2018 02:45 AM    Chloride 106 07/16/2018 02:45 AM CO2 27 07/16/2018 02:45 AM    Anion gap 8 07/16/2018 02:45 AM    Glucose 127 (H) 07/16/2018 02:45 AM    BUN 25 (H) 07/16/2018 02:45 AM    Creatinine 1.36 (H) 07/16/2018 02:45 AM    BUN/Creatinine ratio 18 07/16/2018 02:45 AM    GFR est AA >60 07/16/2018 02:45 AM    GFR est non-AA 50 (L) 07/16/2018 02:45 AM    Calcium 8.4 (L) 07/16/2018 02:45 AM     Lab Results   Component Value Date/Time     (H) 12/18/2015 07:55 PM    CK-MB Index 1.0 12/18/2015 07:55 PM    Troponin-I, Qt. 0.17 (H) 07/14/2018 03:56 AM     Lab Results   Component Value Date/Time    aPTT 27.5 04/26/2009 07:55 AM     Lab Results   Component Value Date/Time    INR 1.1 01/09/2018 04:47 AM    INR 1.0 08/22/2009 01:45 PM    INR 1.0 04/26/2009 07:55 AM    Prothrombin time 10.8 01/09/2018 04:47 AM    Prothrombin time 10.0 08/22/2009 01:45 PM    Prothrombin time 10.2 04/26/2009 07:55 AM     No results found for: BNP, BNPP, XBNPT      Jet Pereira DO

## 2018-07-17 NOTE — PROGRESS NOTES
Problem: Dysphagia (Adult)  Goal: *Acute Goals and Plan of Care (Insert Text)  Swallowing goals initaited 7-17-18:  1) tolerate mech soft, thins without choking by 7-20-18  2)  Take 50% of tray in 30 min by 7-20-18  Speech LAnguage Pathology bedside swallow evaluation  Patient: Vaibhav Rayo (80 y.o. male)  Date: 7/17/2018  Primary Diagnosis: Weakness generalized  Generalized weakness  Weakness generalized  Weakness  Fall  Parkinson's disease ([address]  CKD stage 2 due to type 2 diabetes mellitus ([address]  Type 2 diabetes mellitus ([address]  Essential hypertension        Precautions: aspiration  Fall    ASSESSMENT :  Based on the objective data described below, the patient presents with mild-moderate oral dysphagia with impaired chew and maximum lingual fasciculations and tremor. Pharyngeal phase functional.    Admitted with weakness and bilateral LE swelling. Severe coughing instance this am.    CXR:  Poor inspiratory effect. PMH: HTN, dementia, PD, +tobacco, arthritis, DVT, XOL. Lives with daughter. .    Patient will benefit from skilled intervention to address the above impairments. Patients rehabilitation potential is considered to be Fair  Factors which may influence rehabilitation potential include:   []            None noted  [x]            Mental ability/status  [x]            Medical condition  []            Home/family situation and support systems  []            Safety awareness  []            Pain tolerance/management  []            Other:      PLAN :  Recommendations and Planned Interventions:  Downgrade diet to mech soft, thins. Frequency/Duration: Patient will be followed by speech-language pathology 2 times a week to address goals. Discharge Recommendations: Skilled Nursing Facility     SUBJECTIVE:   Patient stated I don't know.     OBJECTIVE:     Past Medical History:   Diagnosis Date    Arthritis 5/5/2010    Diabetes mellitus type 2, diet-controlled ([address]     DVT (deep venous thrombosis) ([address] right LE DVT (mostly distal and non-occlusive) discovered 6/15    HTN (hypertension) 5/5/2010    hypertensive heart disease (LVH)    Hypercholesteremia 5/5/2010     in 4/09    Orthostatic hypotension     Complaints of dizziness.  (lying) to 120 (standing) in 3/18/11.  Parkinson disease ([facility] [address]     diagnosed around middle of may 2015   No past surgical history on file. Prior Level of Function/Home Situation:   Home Situation  Home Environment: Other (comment) ([facility] )  One/Two Story Residence: One story  Living Alone: No  Support Systems: Child(hector), Other (comments) (dtr lives w him)  Patient Expects to be Discharged to[de-identified] Apartment  Current DME Used/Available at Home: Cane, straight, Walker  Tub or Shower Type: Tub/Shower combination  Diet prior to admission: regular, thins  Current Diet:  Regular, thins   Cognitive and Communication Status:  Neurologic State: Alert  Orientation Level: Oriented to person  Cognition:  (minimally verbal. voice at a low whisper)  Perception: Appears intact  Perseveration: No perseveration noted  Safety/Judgement: Lack of insight into deficits, Decreased insight into deficits, Decreased awareness of need for safety, Decreased awareness of need for assistance, Decreased awareness of environment  Oral Assessment:  Oral Assessment  Labial:  (masked facies)  Dentition: Edentulous  Oral Hygiene: difficult to view due to reduced oral opening  Lingual:  (constant lingual tremor and fasciculations)  Velum: Unable to visualize  Mandible: Restricted  P.O. Trials:  Patient Position: upright in bed  Vocal quality prior to P.O.: Hoarse;Low volume  Consistency Presented: Thin liquid; Solid;Puree  How Presented: SLP-fed/presented;Spoon;Straw;Successive swallows      ORAL PHASE:   Reduced oral opening  -unable to bite most solid items  -frontal chew with head tilted back, vertical chew  -he chewed an average of 2 min per small solid bolus, which is not functional   PHARYNGEAL PHASE:   -timing and strength WFL  This am, he coughed severely for 30 min. Not around meal or med time. SLP looked in his mouth and saw no residue. Cough nonprodutive                                             NOMS:   The NOMS functional outcome measure was used to quantify this patient's level of swallowing impairment. Based on the NOMS, the patient was determined to be at level 5 for swallow function     G Codes: In compliance with CMSs Claims Based Outcome Reporting, the following G-code set was chosen for this patient based the use of the NOMS functional outcome to quantify this patient's level of swallowing impairment. Using the NOMS, the patient was determined to be at level 5 for swallow function which correlates with the CJ= 20-39% level of severity. Based on the objective assessment provided within this note, the current, goal, and discharge g-codes are as follows:    Swallow  Swallowing:   Swallow Current Status CJ= 20-39%   Swallow Goal Status CJ= 20-39%      NOMS Swallowing Levels:  Level 1 (CN): NPO  Level 2 (CM): NPO but takes consistency in therapy  Level 3 (CL): Takes less than 50% of nutrition p.o. and continues with nonoral feedings; and/or safe with mod cues; and/or max diet restriction  Level 4 (CK): Safe swallow but needs mod cues; and/or mod diet restriction; and/or still requires some nonoral feeding/supplements  Level 5 (CJ): Safe swallow with min diet restriction; and/or needs min cues  Level 6 (CI): Independent with p.o.; rare cues; usually self cues; may need to avoid some foods or needs extra time  Level 7 ([address] [place] [address]): Independent for all p.o.  LOBITO. (2003). National Outcomes Measurement System (NOMS): Adult Speech-Language Pathology User's Guide.        Pain:  Pain Scale 1: Numeric (0 - 10)  Pain Intensity 1: 0     After treatment:   []            Patient left in no apparent distress sitting up in chair  []            Patient left in no apparent distress in bed  [] Call bell left within reach  []            Nursing notified  []            Caregiver present  []            Bed alarm activated    COMMUNICATION/EDUCATION:   The patients plan of care including recommendations, planned interventions, and recommended diet changes were discussed with: Registered Nurse. Patient was educated regarding His deficit(s) of dysphagia  as this relates to His diagnosis of dementia, PD. He demonstrated Guarded understanding as evidenced by minimally verbal  .  []            Posted safety precautions in patient's room. []            Patient/family have participated as able in goal setting and plan of care. []            Patient/family agree to work toward stated goals and plan of care. []            Patient understands intent and goals of therapy, but is neutral about his/her participation. [x]            Patient is unable to participate in goal setting and plan of care.     Thank you for this referral.  Alex Ibarra, SLP  Time Calculation: 25 mins

## 2018-07-17 NOTE — PROGRESS NOTES
Samir Garcia [address] Office [phone] Fax [phone] Assessment and Plan Tulio Velazquez is a 80 y.o. male w/ history of HTN, dementia, Parkinson's, CYNTHIA, T2DM (diet controlled), HFpEF, DVT, HLD, and depression who was admitted for generalized weakness. 24 Hour Events: No acute events overnight Generalized weakness POA - normal Neuro exam. Head CT neg. His UA was negative. CXR - Poor inspiratory effort with bibasilar mild interstitial prominence but no definite acute disease.  
- Nutrition following 
- PT/OT recommending SNF for rehab - CM consulted 7/14 - accepted at [facility] - unsure if this is new swelling as patient was alone and poor historian. Last echo on 1/9/2018 - Left ventricle: Ejection fraction was estimated to be 50 %. Wall thickness was mildly increased. Troponin chronically elevated. Admission BNP elevated to 12,000 (previously 3,000 in 1/2018). Echo (7/16): Left ventricle: Ejection fraction was estimated in the range of 45 % to 50%. There is a grainy appearance to the myocardium and would recommend cardiac MRI for any infiltrative processes. - Continue home lasix 20mg daily - Cardiac MRI ordered for today vs outpatient - Cardiology consulted, f/u recs 
  
Parkinson's Dementia  - Patient oriented to person, place, but not time. Was not aware of month or year. No resting tremor on exam. Unclear what patient's baseline is as no family around to corroborate. - continue home sinemet  mg TID 
 
CKD Stage 2 (baseline Cr 1.2-1.3) - Cr 1.20 today, at baseline.  
  
Depression - stable. - continue home sertraline 50 mg daily 
  
Hypertension - Elevated on admission to 170E-636E systolic but now improved, stable at 168/83  
- Home lasix 20 mg daily, labetalol 300 mg BID, losartan 50 mg daily. - Will continue to monitor at this time and readjust as BP's trend. Consider PRN hydralazine.  
  
Diabetes Mellitus T2:  Diet controlled.  No home meds. - Last HgA1c on 2014 was 6.8, repeat A1c on admission 7.0.  
- POC glucose checks ACHS with SSI, normal sensitivity - Has not required any lispro this admission.  
  
History of DVT - Continue home eliquis 2.5 mg BID 
  
Hyperlipidemia: Last lipid panel on 2018 and values were Chol 229 TG 51 .8 HDL 86 
- Continue home simvastatin 40 mg daily 
   
FEN/GI -  Diabetic diet with sodium precautions. Activity - Ambulate with assistance DVT prophylaxis - anticoagulated with home apixaban GI prophylaxis -  Not indicated Disposition - SNF, Approved at [facility] 
  
CODE STATUS:  Full Pt will be discussed with Dr. Anthony Ramirez. I appreciate the opportunity to participate in the care of this patient, Lele Kinsey MD 
Family Medicine Resident Subjective / Objective Subjective He reports that he is feeling good. He denies any pain, SOB, dizziness, weakness. Has chronic cough. Temp (24hrs), Av.7 °F (37.1 °C), Min:97.9 °F (36.6 °C), Max:99.9 °F (37.7 °C) Objective: 
Vital signs: (most recent): Blood pressure 152/72, pulse 84, temperature 98.2 °F (36.8 °C), resp. rate 18, weight 188 lb 12.8 oz (85.6 kg), SpO2 97 %. Respiratory:   O2 Device: Room air Visit Vitals  /72 (BP 1 Location: Right arm, BP Patient Position: At rest)  Pulse 84  Temp 98.2 °F (36.8 °C)  Resp 18  Wt 188 lb 12.8 oz (85.6 kg)  SpO2 97%  BMI 30.47 kg/m2 General: No acute distress. Alert. Cooperative. Head: Normocephalic. Atraumatic. Eyes:              Conjunctiva pink. Sclera white. PERRL. Neck: Supple. Normal ROM. No stiffness. Respiratory: CTAB. No w/r/r/c.  
Cardiovascular: RRR. Normal S1,S2. No m/r/g. Pulses 2+ throughout. GI: + bowel sounds. Nontender. No rebound tenderness or guarding. Nondistended. Musculoskeletal: Full ROM in all extremities. 1+ pitting LE edema. Improved from yesterday. Distal pulses intact. Skin: Warm, dry. No rashes. Neuro: CN II-XII grossly intact. Strength 5/5 in all extremities. Oriented to person, place, but not time. I/O: 
 
Date 07/16/18 0700 - 07/17/18 2967 07/17/18 0700 - 07/18/18 6807 Shift 8840-4936 8131-3725 24 Hour Total 3398-1754 1371-4702 24 Hour Total  
I 
N 
T 
A 
K 
E 
 P.O.  [address] 320     
   P. O.  320 320 Shift Total 
(mL/kg)  320 
(3.7) 320 
(3.7) O 
U T 
P 
U Candi Salcedo Urine (mL/kg/hr) Urine Occurrence(s) 4 x 2 x 6 x Stool Stool Occurrence(s) 4 x 1 x 5 x Shift Total 
(mL/kg) NET  320 320 Weight (kg) 85.6 85.6 85.6 85.6 85.6 85.6 CBC: 
Recent Labs  
   07/16/18 
 0245  07/15/18 
 0424 WBC  7.6  4.9 HGB  11.7*  11.9*  
HCT  35.7*  36.9 PLT  179  178 Metabolic Panel: 
Recent Labs  
   07/16/18 
 0245  07/15/18 
 0424 NA  141  143  
K  3.7  3.8 CL  106  106 CO2  27  30 BUN  25*  24* CREA  1.36*  1.20 GLU  127*  100 CA  8.4*  8.6 For Billing Chief Complaint Patient presents with  Fatigue Hospital Problems  Date Reviewed: 1/9/2018 Codes Class Noted POA Parkinson's disease ([facility] ICD-10-CM: G20 
ICD-9-CM: 332.0  7/16/2018 Unknown Essential hypertension ICD-10-CM: I10 
ICD-9-CM: 401.9  7/16/2018 Unknown Weakness ICD-10-CM: R53.1 ICD-9-CM: 780.79  7/16/2018 Unknown Type 2 diabetes mellitus (HCC) ICD-10-CM: E11.9 ICD-9-CM: 250.00  7/16/2018 Unknown Fall ICD-10-CM: W19. Elpidio Vital ICD-9-CM: Z444.5  7/16/2018 Unknown CKD stage 2 due to type 2 diabetes mellitus ([facility] ICD-10-CM: E11.22, N18.2 ICD-9-CM: 250.40, 585.2  7/16/2018 Unknown Depression ICD-10-CM: F32.9 ICD-9-CM: 809  7/14/2018 Unknown * (Principal)Weakness generalized ICD-10-CM: R53.1 ICD-9-CM: 780.79  7/13/2018 Unknown History of DVT (deep vein thrombosis) ICD-10-CM: O51.487 ICD-9-CM: V12.51  4/11/2017 Yes  Diastolic dysfunction with chronic heart failure ([facility] ICD-10-CM: I50.32 
ICD-9-CM: 428.32  12/21/2015 Yes Parkinsons disease ([facility].) ICD-10-CM: G20 
ICD-9-CM: 332.0  5/26/2015 Yes Diabetes mellitus type II, controlled ([facility].) ICD-10-CM: E11.9 ICD-9-CM: 250.00  1/26/2015 Yes Hyperlipidemia ICD-10-CM: E78.5 ICD-9-CM: 272.4  8/16/2012 Yes Overview Signed 8/16/2012  1:36 PM by Grace Patton MD  
  LDL goal < 70 HTN (hypertension) ICD-10-CM: I10 
ICD-9-CM: 401.9  3/19/2011 Yes Overview Signed 5/23/2011  8:08 AM by Sharee Lawton MD  
  ECHO 5/2011 = LVH, EF 50%

## 2018-07-17 NOTE — DISCHARGE INSTRUCTIONS
[facility] / Dieter Fuentes / [id number] : 1935    Admission date: 2018 Discharge date: 2018       Primary care provider: Devonte Gamez MD    Discharging provider:  Lindy Woodruff MD  - Family Medicine Resident  Viraj Bates MD - Attending, Family Medicine   . . . . . . . . . . . . . . . . . . . . . . . . . . . . . . . . . . . . . . . . . . . . . . . . . . . . . . . . . . . . . . . . . . . . . . . Cinad Alexander FINAL DIAGNOSES & HOSPITAL COURSE:  Generalized weakness POA - He had a normal neurological exam upon arrival.. Head CT () upon admission was negative. His UA was negative. His chest xray () showed poor inspiratory effort with bibasilar mild interstitial prominence but no definite acute disease. Nutrition followed him during his stay and PT and OT worked with him until he was accepted at a skilled nursing facility.     Chronic heart failure - His last echo on 2018 showed an ejection fraction of 50 %. Left ventricular wall thickness was mildly increased. Admission BNP elevated to 12,000 (previously 3,000 in 2018). His lasix 20 mg daily was continued while inpatient, though it was later determined that he was taking 40mg prior to admission which was continued at discharge. His ECHO () Ejection fraction was 45 % to 50%. Wall thickness was moderately to markedly increased. Both atria were dilated with mitral valve regurgitation. There was a grainy appearance to the myocardium during the ECHO, so cardiology was consulted, and they will perform a cardiac MRI outpatient. Chronically elevated troponin - Upon admission his troponin was 0.20 and declined to 0.17. These are both around his baseline.       Parkinson's Dementia  - Patient oriented to person, place, but not time upon admission. Was not aware of month or year. There was no resting tremor on exam. Unclear what patient's baseline is as he was in the ER alone.  His home sinemet  mg three times daily was continued while inpatient.     CKD Stage 2 His baseline creatinine was 1.2-1.3. His creatinine was 1.29 upon discharge. Recommend checking BMP in 2 - 3 days with recent change in lasix.       Depression - stable. His home sertraline 50 mg daily was continued, though it was later discovered he had been taking 100mg at home which was continued at discharge.       Hypertension - Elevated on admission to 689B-157S systolic but now improved. His BP upon discharge is 135/83. His home lasix was continued at 20 mg (though it was later determined he was taking 40mg at home so this was continued at discharge), labetalol 300 mg BID, and losartan 50 mg daily were continued while inpatient.      Diabetes Mellitus T2:  Diet controlled. He does not use home meds. Last HgA1c on 7/2014 was 6.8, repeat A1c on admission 7.0. POC glucose checks and sliding scale insulin were used in the hospital. He did not require any lispro this admission.       History of DVT - Home eliquis 2.5 mg BID was continued. FOLLOW-UP CARE RECOMMENDATIONS:  Follow-up Information     Follow up With Details Comments Hayde Blackwell MD On 8/3/2018 1000  [facility]  [address] [facility] [place] [facility]          Dr Joanna Allen office will call you to set up cardiac MRI    It is very important that you keep follow-up appointment(s). Bring discharge papers, medication list (and/or medication bottles) to follow-up appointments for review by outpatient provider(s). MEDICATION CHANGES:  none      FOLLOW-UP TESTS RECOMMENDED:   Cardiac MRI    ONGOING TREATMENT PLAN: none      PENDING TEST RESULTS:  At the time of discharge the following test results are still pending: none  Please review these results as they become available.     Specific symptoms to watch for: chest pain, shortness of breath, fever, chills, nausea, vomiting, diarrhea, change in mentation, falling, weakness, bleeding. DIET:  Diabetic Diet    ACTIVITY:  Activity as tolerated    WOUND CARE: none    EQUIPMENT needed:  Power wheelchair    INCIDENTAL FINDINGS:  none    GOALS OF CARE:  x  Eventual return to home/independent/assisted living     Long term SNF      Hospice     No rehospitalization     Patient condition at discharge:   Functional status    Poor    x  Deconditioned      Independent   Cognition    Lucid     Forgetful (some sensescence)   x  Dementia   Catheters/lines (plus indication) - none    Sanders     PICC      PEG         Code status  x  Full code      DNR    . . . . . . . . . . . . . . . . . . . . . . . . . . . . . . . . . . . . . . . . . . . . . . . . . . . . . . . . . . . . . . . . . . . . . . . Anuj Woods CHRONIC MEDICAL CONDITIONS:  Problem List as of 7/17/2018  Date Reviewed: 1/9/2018          Codes Class Noted - Resolved    Parkinson's disease ([facility] ICD-10-CM: G20  ICD-9-CM: 332.0  7/16/2018 - Present        Essential hypertension ICD-10-CM: I10  ICD-9-CM: 401.9  7/16/2018 - Present        Weakness ICD-10-CM: R53.1  ICD-9-CM: 780.79  7/16/2018 - Present        Type 2 diabetes mellitus ([facility] ICD-10-CM: E11.9  ICD-9-CM: 250.00  7/16/2018 - Present        Fall ICD-10-CM: W19. Dylan Luke  ICD-9-CM: E888.9  7/16/2018 - Present        CKD stage 2 due to type 2 diabetes mellitus ([facility] ICD-10-CM: E11.22, N18.2  ICD-9-CM: 250.40, 585.2  7/16/2018 - Present        Depression ICD-10-CM: F32.9  ICD-9-CM: 751  7/14/2018 - Present        * (Principal)Weakness generalized ICD-10-CM: R53.1  ICD-9-CM: 780.79  7/13/2018 - Present        Leukopenia ICD-10-CM: D72.819  ICD-9-CM: 288.50  1/12/2018 - Present        Normocytic anemia ICD-10-CM: D64.9  ICD-9-CM: 285.9  1/12/2018 - Present        Acute metabolic encephalopathy T-08-SI: G93.41  ICD-9-CM: 348.31  1/12/2018 - Present        Complicated UTI (urinary tract infection) ICD-10-CM: N39.0  ICD-9-CM: 599.0  1/9/2018 - Present        History of DVT (deep vein thrombosis) ICD-10-CM: D91.807  ICD-9-CM: V12.51  4/11/2017 - Present        Acute on chronic diastolic CHF (congestive heart failure) (HCC) ICD-10-CM: I50.33  ICD-9-CM: 428.33, 428.0  4/7/2017 - Present        Diastolic dysfunction with chronic heart failure (HCC) ICD-10-CM: I50.32  ICD-9-CM: 428.32  12/21/2015 - Present        Mild anemia ICD-10-CM: D64.9  ICD-9-CM: 285.9  12/20/2015 - Present        Accelerated hypertension ICD-10-CM: I10  ICD-9-CM: 401.0  12/18/2015 - Present        Situational anxiety ICD-10-CM: F41.8  ICD-9-CM: 300.09  6/17/2015 - Present        BPH (benign prostatic hypertrophy) ICD-10-CM: N40.0  ICD-9-CM: 600.00  6/17/2015 - Present        Parkinsons disease ([facility] ICD-10-CM: Lajuanda Chatters  ICD-9-CM: 332.0  5/26/2015 - Present        Asthma, mild intermittent ICD-10-CM: J45.20  ICD-9-CM: 493.90  3/17/2015 - Present        Chronic lumbar pain ICD-10-CM: M54.5, G89.29  ICD-9-CM: 724.2, 338.29  3/11/2015 - Present        DJD (degenerative joint disease), lumbar ICD-10-CM: M47.816  ICD-9-CM: 721.3  2/6/2015 - Present        Diabetes mellitus type II, controlled ([facility] ICD-10-CM: E11.9  ICD-9-CM: 250.00  1/26/2015 - Present        Hyperlipidemia ICD-10-CM: E78.5  ICD-9-CM: 272.4  8/16/2012 - Present    Overview Signed 8/16/2012  1:36 PM by Byron Lieberman MD     LDL goal < 70             Abnormal EKG ICD-10-CM: R94.31  ICD-9-CM: 794.31  5/12/2011 - Present    Overview Signed 7/6/2011  5:47 PM by Talya Laughlin MD     Echo 2d adult   5/19/11   mild-mod LVH, EF 50-55% (low normal).  Mod LAE, mild MR.      Nm cardiac spect w stress / rest mult   5/19/11   no inducible ischemia; LVEF 45%              Cardiac dysrhythmia, unspecified ICD-10-CM: I49.9  ICD-9-CM: 427.9  5/12/2011 - Present        HTN (hypertension) ICD-10-CM: I10  ICD-9-CM: 401.9  3/19/2011 - Present    Overview Signed 5/23/2011  8:08 AM by Talya Laughlin MD     ECHO 5/2011 = LVH, EF 50%             ED (erectile dysfunction) ICD-10-CM: N52.9  ICD-9-CM: 607.84  3/19/2011 - Present        RAD (reactive airway disease) ICD-10-CM: J45.909  ICD-9-CM: 493.90  3/19/2011 - Present        H/O: GI bleed ICD-10-CM: Z87.19  ICD-9-CM: V12.79  5/5/2010 - Present        RESOLVED: Generalized weakness ICD-10-CM: R53.1  ICD-9-CM: 780.79  7/13/2018 - 7/14/2018        RESOLVED: Encephalopathy ICD-10-CM: G93.40  ICD-9-CM: 348.30  1/10/2018 - 1/12/2018        RESOLVED: Altered mental status ICD-10-CM: R41.82  ICD-9-CM: 780.97  1/9/2018 - 1/12/2018        RESOLVED: Elevated troponin ICD-10-CM: R74.8  ICD-9-CM: 790.6  1/9/2018 - 1/12/2018        RESOLVED: CHF exacerbation ([facility] ICD-10-CM: I50.9  ICD-9-CM: 428.0  4/7/2017 - 4/8/2017        RESOLVED: Protein in urine ICD-10-CM: R80.9  ICD-9-CM: 791.0  5/28/2015 - 1/11/2016        RESOLVED: Hyperglycemia ICD-10-CM: R73.9  ICD-9-CM: 790.29  9/6/2012 - 3/11/2015    Overview Addendum 7/17/2014  5:13 PM by Raven Turner MD     a1c 6.8 7/2014  Need to repeat to confirm T2DM             RESOLVED: Weight loss, unintentional ICD-10-CM: R63.4  ICD-9-CM: 783.21  7/6/2011 - 3/11/2015    Overview Signed 7/6/2011  5:45 PM by Pablo Peters MD     Saw nutritionist 6/2011 at [address]: Grief ICD-10-CM: F43.20  ICD-9-CM: 309.0  6/3/2011 - 3/11/2015    Overview Signed 6/3/2011 11:06 AM by Pablo Peters MD     Lost wife in 2011             RESOLVED: Prostatism ICD-10-CM: N40.0  ICD-9-CM: 600.90  6/3/2011 - 12/2/2015        RESOLVED: Orthostatic hypotension (Chronic) ICD-10-CM: I95.1  ICD-9-CM: 458.0  Unknown - 1/12/2018    Overview Signed 5/12/2011  2:55 PM by Willam Lowe MD     Complaints of dizziness.  (lying) to 120 (standing) in 3/18/11. RESOLVED: Hypotension, postural ICD-10-CM: I95.1  ICD-9-CM: 458.0  3/19/2011 - 1/12/2018    Overview Signed 7/6/2011  5:47 PM by Pablo Peters MD     Echo 2d adult   5/19/11   mild-mod LVH, EF 50-55% (low normal).  Mod LAE, mild MR.      Nm cardiac spect w stress / rest mult 5/19/11   no inducible ischemia; LVEF 45%              RESOLVED: Arthritis ICD-10-CM: M19.90  ICD-9-CM: 716.90  3/19/2011 - 12/2/2015              Information obtained by :   I understand that if any problems occur once I am at home I am to contact my physician. I understand and acknowledge receipt of the instructions indicated above.                                                                                                                                              Physician's or R.N.'s Signature                                                                  Date/Time                                                                                                                                              Patient or Representative Signature                                                          Date/Time

## 2018-07-17 NOTE — PROGRESS NOTES
7/17/2018  CM (late entry)  [employer] notified Nurse Navigator of pt's discharge. Soraida    7/17/2018   CM ADDENDUM (final note):  CM faxed AVS, and today's MAR to rehab facility at ([phone]). A hard copy will accompany pt via ambulance. Arranged ambulance via [facility] at 5 p.m.  RN, please call report to [phone]. Soraida      7/17/2018  CM ADDENDUM:  Pt's dtr, returned my call and she is full agreement with pt's discharge to [facility] ([address] [place] [address]). Ambulance transport will be arranged once discharge order obtained. Soraida    7/17/2018   CARE MANAGEMENT NOTE:  Pt was evaluated and accepted to [facility] (formerly [address] [place] [address]) and bed is available there today. [employer] left vm messages for dtr, Erendira Read ([phone]; [phone]). Await return call from dtr.   Soraida

## 2018-07-17 NOTE — DISCHARGE SUMMARY
[facility] [address] [place] [address]   Office [phone]  Fax [phone]       Discharge / Transfer / Off-Service Note     Name: Marcos Goldman MRN: [id number]  Sex: Male   YOB: 1935  Age: 80 y.o. PCP: Raven Turner MD     Date of admission: 7/13/2018  Date of discharge/transfer: 7/17/2018    Attending physician at admission: Dr. Rupert Bunn    Attending physician at discharge/transfer: Dr. Rupert Bunn    Resident physician at discharge/transfer: Idris Gonzalez MD     Consultants during hospitalization  Dr. Mikayla Matos, Cardiology     Admission diagnoses   Weakness generalized  Generalized weakness  Weakness generalized  Weakness  Fall  Parkinson's disease [facility])  CKD stage 2 due to type 2 diabetes mellitus ([facility]  Type 2 diabetes mellitus ([facility]  Essential hypertension    Recommended follow-up after discharge  1. PCP   2. Cardiology    Things to follow up on with PCP/cardiologist:  1. Check a BMP for resolution of acute kidney injury  2. Results of inpatient echo  3. Cardiac MRI     Medication Changes:  1. New Medications: none  2. Modified Medications: none  3. Discontinued Medications: none    History of Present Illness  Per admitting provider, Marcos Goldman is a 80 y.o. male w/ history of HTN, dementia, Parkinson's, CYNTHIA, T2DM (diet controlled), HFpEF, DVT, HLD, and depression who presents to the ER complaining of generalized weakness. History is limited due to patient being poor historian. He reports that his daughter had him come to the ED because he was feeling weak. He feels that he felt weak yesterday, but feels a lot better today. He states that he has been feeling unsteady on his feet, and uses a walker to ambulate. He has fallen in the past, but has not hurt himself. He reports that he is in no pain. Pt endorses bilateral lower leg edema.   Pt denies fevers, headaches, blurry vision, congestion, sore throat, CP, SOB, abdominal pain, diarrhea, constipation, nausea, vomiting, and dysuria.       Because patient is poor historian, multiple attempts were made to contact patient's daughter with no answer.     In the ER, vital signs were remarkable for BP of 183/119. Labs were remarkable for for a creatinine of 1.31 (BL 1.2) and glucose of 123. Troponin of 0.2. His UA was negative. Head CT and EKG were unremarkable. His CXR showed poor inspiratory effort with bibasilar mild interstitial prominence but not definite acute illness. Pt was treated with labetalol and a NS bolus.     HOSPITAL COURSE  Generalized weakness POA - He had a normal neurological exam upon arrival.. Head CT (7/13) upon admission was negative. His UA was negative. His chest xray (7/13) showed poor inspiratory effort with bibasilar mild interstitial prominence but no definite acute disease. Nutrition followed him during his stay and PT and OT worked with him until he was accepted at a skilled nursing facility.     Chronic heart failure with reduced ejection fraction- His last echo on 1/9/2018 showed an ejection fraction of 50 %. Left ventricular wall thickness was mildly increased. Admission BNP elevated to 12,000 (previously 3,000 in 1/2018). His lasix 20 mg daily was continued while inpatient, though it was later determined that he was taking 40mg prior to admission which was continued at discharge. His ECHO (7/13) Ejection fraction was 45 % to 50%. Wall thickness was moderately to markedly increased. Both atria were dilated with mitral valve regurgitation. There was a grainy appearance to the myocardium during the ECHO, so cardiology was consulted, and they will perform a cardiac MRI outpatient. Chronically elevated troponin - Upon admission his troponin was 0.20 and declined to 0.17. These are both around his baseline.       Parkinson's Dementia  - Patient oriented to person, place, but not time upon admission. Was not aware of month or year.  There was no resting tremor on exam. Unclear what patient's baseline is as he was in the ER alone. His home sinemet  mg three times daily was continued while inpatient.     CKD Stage 2 His baseline creatinine was 1.2-1.3. His creatinine was 1.29 upon discharge. Recommend checking BMP in 2 - 3 days with recent change in lasix.       Depression - stable. His home sertraline 50 mg daily was continued, though it was later discovered he had been taking 100mg at home which was continued at discharge.       Hypertension - Elevated on admission to 631A-945W systolic but now improved. His BP upon discharge is 135/83. His home lasix was continued at 20 mg (though it was later determined he was taking 40mg at home so this was continued at discharge), labetalol 300 mg BID, and losartan 50 mg daily were continued while inpatient.      Diabetes Mellitus T2:  Diet controlled. He does not use home meds. Last HgA1c on 7/2014 was 6.8, repeat A1c on admission 7.0. POC glucose checks and sliding scale insulin were used in the hospital. He did not require any lispro this admission.       History of DVT - Home eliquis 2.5 mg BID was continued. Physical exam at discharge:    Vitals Visit Vitals    /83 (BP 1 Location: Right arm, BP Patient Position: At rest)    Pulse 84    Temp 98 °F (36.7 °C)    Resp 18    Wt 188 lb 12.8 oz (85.6 kg)    SpO2 97%    BMI 30.47 kg/m2      General Not in apparent distress. HENT Head Normocephalic and atraumatic. Cardio Normal rate, regular rhythm. Exam reveals no gallop and no friction rub. No murmur heard. No chest wall tenderness. Pulmonary Effort normal and breath sounds normal. No respiratory distress. No wheezes, no rales. Abdominal Soft. Bowel sounds normal. No distension. No tenderness. Extremities 1+ edema in lower extremities. No tenderness. Distal pulses intact. Neurological Alert and oriented to person, place, but not to time. Dermatology Skin is warm and dry. No erythema or pallor. Condition at discharge: Stable. Labs  Recent Labs      07/16/18   0245  07/15/18   0424   WBC  7.6  4.9   HGB  11.7*  11.9*   HCT  35.7*  36.9   PLT  179  178     Recent Labs      07/17/18   1427  07/16/18   0245  07/15/18   0424   NA  143  141  143   K  3.5  3.7  3.8   CL  105  106  106   CO2  30  27  30   BUN  27*  25*  24*   CREA  1.29  1.36*  1.20   GLU  132*  127*  100   CA  8.1*  8.4*  8.6     No results for input(s): SGOT, GPT, ALT, AP, TBIL, TBILI, TP, ALB, GLOB, GGT, AML, LPSE in the last 72 hours. No lab exists for component: AMYP, HLPSE  Recent Labs      07/17/18   1610  07/17/18   1124  07/17/18   0738  07/16/18   2318  07/16/18   1618   GLUCPOC  110*  110*  117*  124*  103*     Cultures  · None    Procedures / Diagnostic Studies  · ECHO 7/13: Left ventricle: Ejection fraction was estimated in the range of 45 % to 50  %. Wall thickness was moderately to markedly increased. Left atrium: The atrium was severely dilated. Right atrium: The atrium was dilated. Mitral valve: There was mild regurgitation. RECOMMENDATIONS:  There is a grainy appearance to the myocardium and would recommend cardiac  MRI for any infiltrative processes. Imaging    Results from Hospital Encounter encounter on 07/13/18   XR CHEST PORT   Narrative Indication: Cough    Portable chest 11:30 demonstrates a perforation with mild bibasal atelectasis. There is moderate cardiomegaly. Lungs are otherwise fully expanded and clear. There are degenerative changes at the left shoulder. Impression IMPRESSION: Mild bibasilar atelectasis without significant change from July 13. No results found for this or any previous visit. Results from [place] encounter on 07/13/18   CT HEAD WO CONT   Narrative INDICATION: Weakness. Fatigue. Recent urinary tract infection. Exam: Noncontrast CT of the brain is performed with 5 mm collimation.     CT dose reduction was achieved to the use of a standardized protocol tailored  for this examination and automatic exposure control for dose modulation. Direct comparison is made to prior CT dated January 8, 2018. FINDINGS: There is no acute intracranial hemorrhage, mass, mass effect or  herniation. There is age-appropriate diffuse cortical atrophy with ex vacuo  dilatation of the ventricular system. There are stable confluent periventricular  hypodensities consistent with chronic microvascular ischemic changes. There is  no evidence of acute territorial infarct. The mastoid air cells are well  pneumatized. There is partial opacification of the left frontal sinus and  several anterior left ethmoid air cells. The visualized paranasal sinuses are  otherwise clear. Impression IMPRESSION: No acute intracranial hemorrhage or infarct. No procedure found. Chronic diagnoses   Problem List as of 7/17/2018  Date Reviewed: 1/9/2018          Codes Class Noted - Resolved    Parkinson's disease ([facility] ICD-10-CM: G20  ICD-9-CM: 332.0  7/16/2018 - Present        Essential hypertension ICD-10-CM: I10  ICD-9-CM: 401.9  7/16/2018 - Present        Weakness ICD-10-CM: R53.1  ICD-9-CM: 780.79  7/16/2018 - Present        Type 2 diabetes mellitus ([facility] ICD-10-CM: E11.9  ICD-9-CM: 250.00  7/16/2018 - Present        Fall ICD-10-CM: W19. Beatriz Edyta  ICD-9-CM: E888.9  7/16/2018 - Present        CKD stage 2 due to type 2 diabetes mellitus ([facility] ICD-10-CM: E11.22, N18.2  ICD-9-CM: 250.40, 585.2  7/16/2018 - Present        Depression ICD-10-CM: F32.9  ICD-9-CM: 181  7/14/2018 - Present        * (Principal)Weakness generalized ICD-10-CM: R53.1  ICD-9-CM: 780.79  7/13/2018 - Present        Leukopenia ICD-10-CM: D72.819  ICD-9-CM: 288.50  1/12/2018 - Present        Normocytic anemia ICD-10-CM: D64.9  ICD-9-CM: 285.9  1/12/2018 - Present        Acute metabolic encephalopathy [facility]: G93.41  ICD-9-CM: 348.31  1/12/2018 - Present        Complicated UTI (urinary tract infection) ICD-10-CM: N39.0  ICD-9-CM: 599.0  1/9/2018 - Present        History of DVT (deep vein thrombosis) ICD-10-CM: F40.366  ICD-9-CM: V12.51  4/11/2017 - Present        Acute on chronic diastolic CHF (congestive heart failure) (HCC) ICD-10-CM: I50.33  ICD-9-CM: 428.33, 428.0  4/7/2017 - Present        Diastolic dysfunction with chronic heart failure (HCC) ICD-10-CM: I50.32  ICD-9-CM: 428.32  12/21/2015 - Present        Mild anemia ICD-10-CM: D64.9  ICD-9-CM: 285.9  12/20/2015 - Present        Accelerated hypertension ICD-10-CM: I10  ICD-9-CM: 401.0  12/18/2015 - Present        Situational anxiety ICD-10-CM: F41.8  ICD-9-CM: 300.09  6/17/2015 - Present        BPH (benign prostatic hypertrophy) ICD-10-CM: N40.0  ICD-9-CM: 600.00  6/17/2015 - Present        Parkinsons disease ([facility] ICD-10-CM: [place]  ICD-9-CM: 332.0  5/26/2015 - Present        Asthma, mild intermittent ICD-10-CM: J45.20  ICD-9-CM: 493.90  3/17/2015 - Present        Chronic lumbar pain ICD-10-CM: M54.5, G89.29  ICD-9-CM: 724.2, 338.29  3/11/2015 - Present        DJD (degenerative joint disease), lumbar ICD-10-CM: M47.816  ICD-9-CM: 721.3  2/6/2015 - Present        Diabetes mellitus type II, controlled ([facility] ICD-10-CM: E11.9  ICD-9-CM: 250.00  1/26/2015 - Present        Hyperlipidemia ICD-10-CM: E78.5  ICD-9-CM: 272.4  8/16/2012 - Present    Overview Signed 8/16/2012  1:36 PM by Teofilo Ann MD     LDL goal < 70             Abnormal EKG ICD-10-CM: R94.31  ICD-9-CM: 794.31  5/12/2011 - Present    Overview Signed 7/6/2011  5:47 PM by Jory Coelho MD     Echo 2d adult   5/19/11   mild-mod LVH, EF 50-55% (low normal).  Mod LAE, mild MR.      Nm cardiac spect w stress / rest mult   5/19/11   no inducible ischemia; LVEF 45%              Cardiac dysrhythmia, unspecified ICD-10-CM: I49.9  ICD-9-CM: 427.9  5/12/2011 - Present        HTN (hypertension) ICD-10-CM: I10  ICD-9-CM: 401.9  3/19/2011 - Present    Overview Signed 5/23/2011  8:08 AM by Maritza Villatoro Alexandra Charles MD     ECHO 5/2011 = LVH, EF 50%             ED (erectile dysfunction) ICD-10-CM: N52.9  ICD-9-CM: 607.84  3/19/2011 - Present        RAD (reactive airway disease) ICD-10-CM: J45.909  ICD-9-CM: 493.90  3/19/2011 - Present        H/O: GI bleed ICD-10-CM: Z87.19  ICD-9-CM: V12.79  5/5/2010 - Present        RESOLVED: Generalized weakness ICD-10-CM: R53.1  ICD-9-CM: 780.79  7/13/2018 - 7/14/2018        RESOLVED: Encephalopathy ICD-10-CM: G93.40  ICD-9-CM: 348.30  1/10/2018 - 1/12/2018        RESOLVED: Altered mental status ICD-10-CM: R41.82  ICD-9-CM: 780.97  1/9/2018 - 1/12/2018        RESOLVED: Elevated troponin ICD-10-CM: R74.8  ICD-9-CM: 790.6  1/9/2018 - 1/12/2018        RESOLVED: CHF exacerbation ([address] ICD-10-CM: I50.9  ICD-9-CM: 428.0  4/7/2017 - 4/8/2017        RESOLVED: Protein in urine ICD-10-CM: R80.9  ICD-9-CM: 791.0  5/28/2015 - 1/11/2016        RESOLVED: Hyperglycemia ICD-10-CM: R73.9  ICD-9-CM: 790.29  9/6/2012 - 3/11/2015    Overview Addendum 7/17/2014  5:13 PM by Reshma Patrick MD     a1c 6.8 7/2014  Need to repeat to confirm T2DM             RESOLVED: Weight loss, unintentional ICD-10-CM: R63.4  ICD-9-CM: 783.21  7/6/2011 - 3/11/2015    Overview Signed 7/6/2011  5:45 PM by Chaitanya Hernandez MD     Saw nutritionist 6/2011 at [address]: Grief ICD-10-CM: F43.20  ICD-9-CM: 309.0  6/3/2011 - 3/11/2015    Overview Signed 6/3/2011 11:06 AM by Chaitanya Hernandez MD     Lost wife in 2011             RESOLVED: Prostatism ICD-10-CM: N40.0  ICD-9-CM: 600.90  6/3/2011 - 12/2/2015        RESOLVED: Orthostatic hypotension (Chronic) ICD-10-CM: I95.1  ICD-9-CM: 458.0  Unknown - 1/12/2018    Overview Signed 5/12/2011  2:55 PM by Kallie Qiu MD     Complaints of dizziness.  (lying) to 120 (standing) in 3/18/11.              RESOLVED: Hypotension, postural ICD-10-CM: I95.1  ICD-9-CM: 458.0  3/19/2011 - 1/12/2018    Overview Signed 7/6/2011  5:47 PM by Chaitanya Hernandez MD     Echo 2d adult 5/19/11   mild-mod LVH, EF 50-55% (low normal). Mod LAE, mild MR.      Nm cardiac spect w stress / rest mult   5/19/11   no inducible ischemia; LVEF 45%              RESOLVED: Arthritis ICD-10-CM: M19.90  ICD-9-CM: 716.90  3/19/2011 - 12/2/2015              Discharge/Transfer Medications  Current Discharge Medication List      CONTINUE these medications which have CHANGED    Details   simvastatin (ZOCOR) 40 mg tablet Take 1 Tab by mouth nightly. Qty: 30 Tab, Refills: 0         CONTINUE these medications which have NOT CHANGED    Details   furosemide (LASIX) 40 mg tablet Take 40 mg by mouth daily. sertraline (ZOLOFT) 100 mg tablet Take 100 mg by mouth daily. atorvastatin (LIPITOR) 20 mg tablet Take 20 mg by mouth daily. labetalol (NORMODYNE) 300 mg tablet TAKE ONE TABLET BY MOUTH TWICE DAILY  Qty: 60 Tab, Refills: 5    Comments: PAST DUE FOR APPT  Associated Diagnoses: Essential hypertension      losartan (COZAAR) 50 mg tablet Take 1 Tab by mouth daily. Qty: 30 Tab, Refills: 5    Comments: PAST DUE FOR APPT  Associated Diagnoses: Essential hypertension      apixaban (ELIQUIS) 2.5 mg tablet TAKE ONE TABLET BY MOUTH TWICE DAILY  Qty: 60 Tab, Refills: 12    Associated Diagnoses: Atrial fibrillation, unspecified type (HCC)      carbidopa-levodopa (SINEMET)  mg per tablet TAKE ONE TABLET BY MOUTH THREE TIMES DAILY  Qty: 90 Tab, Refills: 3    Associated Diagnoses: Parkinsons disease ([address]      therapeutic multivitamin (THERAGRAN) tablet Take 1 Tab by mouth daily. Diet:  Diabetic diet.     Activity:  As tolerated    Disposition: Skilled nursing facility ([facility])    Discharge instructions to patient/family  Please seek medical attention for any new or worsening symptoms particularly fever, chest pain, shortness of breath, abdominal pain, nausea, vomiting    Follow up plans/appointments  Follow-up Information     Follow up With Details Comments Hayde Blackwell MD On 8/3/2018 1000  [place]  [address] [address]  [phone]             Lele Kinsey MD  Family Medicine Resident       For Billing    Chief Complaint   Patient presents with   [facility] Problems  Date Reviewed: 1/9/2018          Codes Class Noted POA    Parkinson's disease ([facility] ICD-10-CM: G20  ICD-9-CM: 332.0  7/16/2018 Unknown        Essential hypertension ICD-10-CM: I10  ICD-9-CM: 401.9  7/16/2018 Unknown        Weakness ICD-10-CM: R53.1  ICD-9-CM: 780.79  7/16/2018 Unknown        Type 2 diabetes mellitus ([facility] ICD-10-CM: E11.9  ICD-9-CM: 250.00  7/16/2018 Unknown        Fall ICD-10-CM: W19. Min Grief  ICD-9-CM: E888.9  7/16/2018 Unknown        CKD stage 2 due to type 2 diabetes mellitus ([facility] ICD-10-CM: E11.22, N18.2  ICD-9-CM: 250.40, 585.2  7/16/2018 Unknown        Depression ICD-10-CM: F32.9  ICD-9-CM: 507  7/14/2018 Unknown        * (Principal)Weakness generalized ICD-10-CM: R53.1  ICD-9-CM: 780.79  7/13/2018 Unknown        History of DVT (deep vein thrombosis) ICD-10-CM: Z86.718  ICD-9-CM: V12.51  4/11/2017 Yes        Diastolic dysfunction with chronic heart failure ([facility] ICD-10-CM: I50.32  ICD-9-CM: 428.32  12/21/2015 Yes        Parkinsons disease ([facility] ICD-10-CM: Marilu Meg  ICD-9-CM: 332.0  5/26/2015 Yes        Diabetes mellitus type II, controlled ([facility] ICD-10-CM: E11.9  ICD-9-CM: 250.00  1/26/2015 Yes        Hyperlipidemia ICD-10-CM: E78.5  ICD-9-CM: 272.4  8/16/2012 Yes    Overview Signed 8/16/2012  1:36 PM by Gali Matthews MD     LDL goal < 70             HTN (hypertension) ICD-10-CM: I10  ICD-9-CM: 401.9  3/19/2011 Yes    Overview Signed 5/23/2011  8:08 AM by Nesha Ackerman MD     ECHO 5/2011 = LVH, EF 50%

## 2018-07-17 NOTE — PROGRESS NOTES
Problem: Mobility Impaired (Adult and Pediatric)  Goal: *Acute Goals and Plan of Care (Insert Text)  Physical Therapy Goals  Initiated 7/14/2018  1. Patient will move from supine to sit and sit to supine  in bed with minimal assistance/contact guard assist within 7 day(s). 2.  Patient will transfer from bed to chair and chair to bed with minimal assistance/contact guard assist using the least restrictive device within 7 day(s). 3.  Patient will perform sit to stand with minimal assistance/contact guard assist within 7 day(s). 4.  Patient will ambulate with minimal assistance/contact guard assist for 100 feet with the least restrictive device within 7 day(s). physical Therapy TREATMENT  Patient: Zainab Hudson (20 y.o. male)  Date: 7/17/2018  Diagnosis: Weakness generalized  Generalized weakness  Weakness generalized  Weakness  Fall  Parkinson's disease ([address]  CKD stage 2 due to type 2 diabetes mellitus ([facility]  Type 2 diabetes mellitus ([facility]  Essential hypertension Weakness generalized       Precautions: Fall  Chart, physical therapy assessment, plan of care and goals were reviewed. ASSESSMENT:  Pt received AAROM to LE. Pt presents quite weak and lethargic. Pt to sit with max to total assist of 2. Pt progressed to min to mod assist sitting. Pt was not able to use UE for balance or bed mobility. Pt not speaking and eventually kept eyes closed. Pt returned to supine with total assist of 2 . Pt with very little or no participation. Pt progress slow. Continue goals. Progression toward goals:  []    Improving appropriately and progressing toward goals  []    Improving slowly and progressing toward goals  []    Not making progress toward goals and plan of care will be adjusted     PLAN:  Patient continues to benefit from skilled intervention to address the above impairments. Continue treatment per established plan of care.   Discharge Recommendations:  [place]  Further Equipment Recommendations for Discharge:  power wheelchair      SUBJECTIVE:       OBJECTIVE DATA SUMMARY:   Critical Behavior:  Neurologic State: Alert  Orientation Level: Oriented to person  Cognition:  (minimally verbal. voice at a low whisper)  Safety/Judgement: Lack of insight into deficits, Decreased insight into deficits, Decreased awareness of need for safety, Decreased awareness of need for assistance, Decreased awareness of environment  Functional Mobility Training:  Bed Mobility:  Rolling: Total assistance  Supine to Sit: Maximum assistance;Assist x2;Total assistance  Sit to Supine: Maximum assistance; Total assistance;Assist x2  Scooting: Total assistance              Balance:  Sitting: Impaired; With support  Sitting - Static: Fair (occasional)    Therapeutic Exercises:   Heel sides ankle pumps hip abd/add  Pain:  Pain Scale 1: Numeric (0 - 10)  Pain Intensity 1: 0              Activity Tolerance:   Pt tolerated treatment fair poor  Please refer to the flowsheet for vital signs taken during this treatment.   After treatment:   []    Patient left in no apparent distress sitting up in chair  [x]    Patient left in no apparent distress in bed  []    Call bell left within reach  []    Nursing notified  []    Caregiver present  []    Bed alarm activated    COMMUNICATION/COLLABORATION:   The patients plan of care was discussed with: Physical Therapist    Almita Gray PTA   Time Calculation: 23 mins

## 2018-07-17 NOTE — PROGRESS NOTES
Bedside and Verbal shift change report given to Paul Yost (oncoming nurse) by Isabela Smith (offgoing nurse). Report included the following information SBAR, Kardex and MAR.

## 2018-07-18 ENCOUNTER — PATIENT OUTREACH (OUTPATIENT)
Dept: FAMILY MEDICINE CLINIC | Age: 83
End: 2018-07-18

## 2018-07-18 NOTE — PROGRESS NOTES
This NN contacted Jayna Perez  5th floor at [facility],  To inquire how involved pt's family was in pt's care. This NN has previously contacted family after pt's ED visit and was told \"have the wrong number and do not call back\". Per  family is not very involved in care. Have placed a call to Essence Lopez NN to discuss patient. Call placed to [facility] and message left with SW to please call. [employer] ([facility])- Returned this NN call,  States she will be meeting with pt's daughter today. She states that family was talking about having pt into Long Term SNF placement after stay at [facility] Mohinder Goncalves said pt only has Medicare Part A insurance. This NN expressed concern about whether there family is engaged in pt's care at home,  And advised her of this NN last encounter with (family?).  states that she will keep this NN informed about POC for this patient and meeting with daughter this afternoon.

## 2018-08-03 ENCOUNTER — IP HISTORICAL/CONVERTED ENCOUNTER (OUTPATIENT)
Dept: OTHER | Age: 83
End: 2018-08-03

## 2018-08-09 ENCOUNTER — IP HISTORICAL/CONVERTED ENCOUNTER (OUTPATIENT)
Dept: OTHER | Age: 83
End: 2018-08-09

## 2018-11-21 ENCOUNTER — OP HISTORICAL/CONVERTED ENCOUNTER (OUTPATIENT)
Dept: OTHER | Age: 83
End: 2018-11-21

## 2018-12-14 ENCOUNTER — OP HISTORICAL/CONVERTED ENCOUNTER (OUTPATIENT)
Dept: OTHER | Age: 83
End: 2018-12-14

## 2019-02-06 NOTE — ED NOTES
Straight cath procedure completed using sterile procedures, 150 mls of cloudy, turbid yellow urine returned. Specimen to lab. <<----- Click to add NO pertinent Past Medical History No pertinent past medical history

## 2019-02-18 ENCOUNTER — OP HISTORICAL/CONVERTED ENCOUNTER (OUTPATIENT)
Dept: OTHER | Age: 84
End: 2019-02-18

## 2019-03-13 ENCOUNTER — OP HISTORICAL/CONVERTED ENCOUNTER (OUTPATIENT)
Dept: OTHER | Age: 84
End: 2019-03-13

## 2019-03-28 ENCOUNTER — OP HISTORICAL/CONVERTED ENCOUNTER (OUTPATIENT)
Dept: OTHER | Age: 84
End: 2019-03-28

## 2019-03-29 ENCOUNTER — OP HISTORICAL/CONVERTED ENCOUNTER (OUTPATIENT)
Dept: OTHER | Age: 84
End: 2019-03-29

## 2019-04-18 ENCOUNTER — OP HISTORICAL/CONVERTED ENCOUNTER (OUTPATIENT)
Dept: OTHER | Age: 84
End: 2019-04-18

## 2019-05-10 ENCOUNTER — OP HISTORICAL/CONVERTED ENCOUNTER (OUTPATIENT)
Dept: OTHER | Age: 84
End: 2019-05-10

## 2019-06-14 ENCOUNTER — OP HISTORICAL/CONVERTED ENCOUNTER (OUTPATIENT)
Dept: OTHER | Age: 84
End: 2019-06-14

## 2019-07-08 ENCOUNTER — OP HISTORICAL/CONVERTED ENCOUNTER (OUTPATIENT)
Dept: OTHER | Age: 84
End: 2019-07-08

## 2019-07-12 ENCOUNTER — OP HISTORICAL/CONVERTED ENCOUNTER (OUTPATIENT)
Dept: OTHER | Age: 84
End: 2019-07-12

## 2019-07-24 ENCOUNTER — OP HISTORICAL/CONVERTED ENCOUNTER (OUTPATIENT)
Dept: OTHER | Age: 84
End: 2019-07-24

## 2019-07-31 ENCOUNTER — IP HISTORICAL/CONVERTED ENCOUNTER (OUTPATIENT)
Dept: OTHER | Age: 84
End: 2019-07-31

## 2019-08-07 ENCOUNTER — OP HISTORICAL/CONVERTED ENCOUNTER (OUTPATIENT)
Dept: OTHER | Age: 84
End: 2019-08-07

## 2019-08-20 ENCOUNTER — OP HISTORICAL/CONVERTED ENCOUNTER (OUTPATIENT)
Dept: OTHER | Age: 84
End: 2019-08-20

## 2019-09-05 ENCOUNTER — OP HISTORICAL/CONVERTED ENCOUNTER (OUTPATIENT)
Dept: OTHER | Age: 84
End: 2019-09-05

## 2019-10-09 ENCOUNTER — OP HISTORICAL/CONVERTED ENCOUNTER (OUTPATIENT)
Dept: OTHER | Age: 84
End: 2019-10-09

## 2019-10-15 ENCOUNTER — OP HISTORICAL/CONVERTED ENCOUNTER (OUTPATIENT)
Dept: OTHER | Age: 84
End: 2019-10-15

## 2019-11-26 ENCOUNTER — OP HISTORICAL/CONVERTED ENCOUNTER (OUTPATIENT)
Dept: OTHER | Age: 84
End: 2019-11-26

## 2019-12-16 NOTE — PROGRESS NOTES
01/10/18 10:31 AM 
CM spoke with patient and attempted phone contact with daughter, Yessi Copeland ([phone]) to discuss potential need for [facility] vs. SNF and get choices on each. VM left with daughter. PT/OT has not been able to see patient, as he declined treatment yesterday. CM encouraged patient to work with therapies today to assist medical team in moving forward with making plans for his discharge.  
BRENDA Escobar 
 Satisfactory

## 2021-08-03 PROBLEM — I10 ESSENTIAL HYPERTENSION: Status: RESOLVED | Noted: 2018-07-16 | Resolved: 2021-08-03
